# Patient Record
Sex: FEMALE | Race: WHITE | NOT HISPANIC OR LATINO | Employment: OTHER | URBAN - METROPOLITAN AREA
[De-identification: names, ages, dates, MRNs, and addresses within clinical notes are randomized per-mention and may not be internally consistent; named-entity substitution may affect disease eponyms.]

---

## 2017-01-05 ENCOUNTER — ALLSCRIPTS OFFICE VISIT (OUTPATIENT)
Dept: OTHER | Facility: OTHER | Age: 82
End: 2017-01-05

## 2017-02-23 ENCOUNTER — HOSPITAL ENCOUNTER (INPATIENT)
Facility: HOSPITAL | Age: 82
LOS: 4 days | Discharge: HOME | DRG: 865 | End: 2017-02-27
Attending: EMERGENCY MEDICINE | Admitting: INTERNAL MEDICINE
Payer: MEDICARE

## 2017-02-23 ENCOUNTER — APPOINTMENT (EMERGENCY)
Dept: CT IMAGING | Facility: HOSPITAL | Age: 82
DRG: 865 | End: 2017-02-23
Payer: MEDICARE

## 2017-02-23 ENCOUNTER — APPOINTMENT (EMERGENCY)
Dept: RADIOLOGY | Facility: HOSPITAL | Age: 82
DRG: 865 | End: 2017-02-23
Payer: MEDICARE

## 2017-02-23 DIAGNOSIS — R53.83 LETHARGY: ICD-10-CM

## 2017-02-23 DIAGNOSIS — G20 PARKINSON'S DISEASE (HCC): ICD-10-CM

## 2017-02-23 DIAGNOSIS — N39.0 URINARY TRACT INFECTION: Primary | ICD-10-CM

## 2017-02-23 DIAGNOSIS — R29.6 FALLS FREQUENTLY: ICD-10-CM

## 2017-02-23 DIAGNOSIS — J40 BRONCHITIS: ICD-10-CM

## 2017-02-23 DIAGNOSIS — E87.1 HYPONATREMIA: ICD-10-CM

## 2017-02-23 DIAGNOSIS — R53.1 ASTHENIA: ICD-10-CM

## 2017-02-23 DIAGNOSIS — R29.6 FREQUENT FALLS: ICD-10-CM

## 2017-02-23 DIAGNOSIS — E05.90 HYPERTHYROIDISM: ICD-10-CM

## 2017-02-23 PROBLEM — I10 ESSENTIAL HYPERTENSION: Status: ACTIVE | Noted: 2017-02-23

## 2017-02-23 LAB
ALBUMIN SERPL BCP-MCNC: 3.2 G/DL (ref 3.5–5)
ALP SERPL-CCNC: 149 U/L (ref 46–116)
ALT SERPL W P-5'-P-CCNC: 13 U/L (ref 12–78)
ANION GAP SERPL CALCULATED.3IONS-SCNC: 3 MMOL/L (ref 4–13)
AST SERPL W P-5'-P-CCNC: 51 U/L (ref 5–45)
BASOPHILS # BLD AUTO: 0.01 THOUSANDS/ΜL (ref 0–0.1)
BASOPHILS NFR BLD AUTO: 0 % (ref 0–1)
BILIRUB SERPL-MCNC: 0.5 MG/DL (ref 0.2–1)
BUN SERPL-MCNC: 15 MG/DL (ref 5–25)
CALCIUM SERPL-MCNC: 8.5 MG/DL (ref 8.3–10.1)
CHLORIDE SERPL-SCNC: 100 MMOL/L (ref 100–108)
CLARITY, POC: NORMAL
CO2 SERPL-SCNC: 28 MMOL/L (ref 21–32)
COLOR, POC: YELLOW
CREAT SERPL-MCNC: 0.87 MG/DL (ref 0.6–1.3)
EOSINOPHIL # BLD AUTO: 0 THOUSAND/ΜL (ref 0–0.61)
EOSINOPHIL NFR BLD AUTO: 0 % (ref 0–6)
ERYTHROCYTE [DISTWIDTH] IN BLOOD BY AUTOMATED COUNT: 14.5 % (ref 11.6–15.1)
EXT BILIRUBIN, UA: NEGATIVE
EXT BLOOD URINE: NORMAL
EXT GLUCOSE, UA: NEGATIVE
EXT KETONES: NORMAL
EXT NITRITE, UA: POSITIVE
EXT PH, UA: 6
EXT PROTEIN, UA: NORMAL
EXT SPECIFIC GRAVITY, UA: 1.03
EXT UROBILINOGEN: NEGATIVE
GFR SERPL CREATININE-BSD FRML MDRD: >60 ML/MIN/1.73SQ M
GLUCOSE SERPL-MCNC: 93 MG/DL (ref 65–140)
HCT VFR BLD AUTO: 40.2 % (ref 34.8–46.1)
HGB BLD-MCNC: 13.2 G/DL (ref 11.5–15.4)
LACTATE SERPL-SCNC: 1.1 MMOL/L (ref 0.5–2)
LYMPHOCYTES # BLD AUTO: 1.24 THOUSANDS/ΜL (ref 0.6–4.47)
LYMPHOCYTES NFR BLD AUTO: 18 % (ref 14–44)
MAGNESIUM SERPL-MCNC: 2.2 MG/DL (ref 1.6–2.6)
MCH RBC QN AUTO: 30 PG (ref 26.8–34.3)
MCHC RBC AUTO-ENTMCNC: 32.8 G/DL (ref 31.4–37.4)
MCV RBC AUTO: 91 FL (ref 82–98)
MONOCYTES # BLD AUTO: 0.74 THOUSAND/ΜL (ref 0.17–1.22)
MONOCYTES NFR BLD AUTO: 11 % (ref 4–12)
NEUTROPHILS # BLD AUTO: 4.74 THOUSANDS/ΜL (ref 1.85–7.62)
NEUTS SEG NFR BLD AUTO: 71 % (ref 43–75)
PLATELET # BLD AUTO: 192 THOUSANDS/UL (ref 149–390)
PMV BLD AUTO: 11.2 FL (ref 8.9–12.7)
POTASSIUM SERPL-SCNC: 3.7 MMOL/L (ref 3.5–5.3)
PROT SERPL-MCNC: 6.5 G/DL (ref 6.4–8.2)
RBC # BLD AUTO: 4.4 MILLION/UL (ref 3.81–5.12)
SODIUM SERPL-SCNC: 131 MMOL/L (ref 136–145)
TROPONIN I SERPL-MCNC: 0.03 NG/ML
TSH SERPL DL<=0.05 MIU/L-ACNC: 0.24 UIU/ML (ref 0.36–3.74)
WBC # BLD AUTO: 6.73 THOUSAND/UL (ref 4.31–10.16)
WBC # BLD EST: NEGATIVE 10*3/UL

## 2017-02-23 PROCEDURE — 87186 SC STD MICRODIL/AGAR DIL: CPT | Performed by: PHYSICIAN ASSISTANT

## 2017-02-23 PROCEDURE — 99285 EMERGENCY DEPT VISIT HI MDM: CPT

## 2017-02-23 PROCEDURE — 81002 URINALYSIS NONAUTO W/O SCOPE: CPT | Performed by: PHYSICIAN ASSISTANT

## 2017-02-23 PROCEDURE — 87040 BLOOD CULTURE FOR BACTERIA: CPT | Performed by: PHYSICIAN ASSISTANT

## 2017-02-23 PROCEDURE — 87086 URINE CULTURE/COLONY COUNT: CPT | Performed by: PHYSICIAN ASSISTANT

## 2017-02-23 PROCEDURE — 87798 DETECT AGENT NOS DNA AMP: CPT | Performed by: PHYSICIAN ASSISTANT

## 2017-02-23 PROCEDURE — 83605 ASSAY OF LACTIC ACID: CPT | Performed by: PHYSICIAN ASSISTANT

## 2017-02-23 PROCEDURE — 93005 ELECTROCARDIOGRAM TRACING: CPT

## 2017-02-23 PROCEDURE — 93005 ELECTROCARDIOGRAM TRACING: CPT | Performed by: PHYSICIAN ASSISTANT

## 2017-02-23 PROCEDURE — 73502 X-RAY EXAM HIP UNI 2-3 VIEWS: CPT

## 2017-02-23 PROCEDURE — 87077 CULTURE AEROBIC IDENTIFY: CPT | Performed by: PHYSICIAN ASSISTANT

## 2017-02-23 PROCEDURE — 80053 COMPREHEN METABOLIC PANEL: CPT | Performed by: PHYSICIAN ASSISTANT

## 2017-02-23 PROCEDURE — 36415 COLL VENOUS BLD VENIPUNCTURE: CPT | Performed by: PHYSICIAN ASSISTANT

## 2017-02-23 PROCEDURE — 84484 ASSAY OF TROPONIN QUANT: CPT | Performed by: PHYSICIAN ASSISTANT

## 2017-02-23 PROCEDURE — 71020 HB CHEST X-RAY 2VW FRONTAL&LATL: CPT

## 2017-02-23 PROCEDURE — 73564 X-RAY EXAM KNEE 4 OR MORE: CPT

## 2017-02-23 PROCEDURE — 73130 X-RAY EXAM OF HAND: CPT

## 2017-02-23 PROCEDURE — 85025 COMPLETE CBC W/AUTO DIFF WBC: CPT | Performed by: PHYSICIAN ASSISTANT

## 2017-02-23 PROCEDURE — 96360 HYDRATION IV INFUSION INIT: CPT

## 2017-02-23 PROCEDURE — 70450 CT HEAD/BRAIN W/O DYE: CPT

## 2017-02-23 PROCEDURE — 83735 ASSAY OF MAGNESIUM: CPT | Performed by: PHYSICIAN ASSISTANT

## 2017-02-23 PROCEDURE — 84443 ASSAY THYROID STIM HORMONE: CPT | Performed by: PHYSICIAN ASSISTANT

## 2017-02-23 RX ORDER — CARBIDOPA AND LEVODOPA 25; 100 MG/1; MG/1
1 TABLET, EXTENDED RELEASE ORAL 3 TIMES DAILY
COMMUNITY
End: 2018-05-22 | Stop reason: SDUPTHER

## 2017-02-23 RX ORDER — LEVOTHYROXINE SODIUM 0.05 MG/1
50 TABLET ORAL
Status: DISCONTINUED | OUTPATIENT
Start: 2017-02-24 | End: 2017-02-27 | Stop reason: HOSPADM

## 2017-02-23 RX ORDER — LEVOTHYROXINE AND LIOTHYRONINE 19; 4.5 UG/1; UG/1
15 TABLET ORAL DAILY
Status: ON HOLD | COMMUNITY
End: 2019-01-01 | Stop reason: SDUPTHER

## 2017-02-23 RX ORDER — SELEGILINE HYDROCHLORIDE 5 MG/1
5 TABLET ORAL
Status: DISCONTINUED | OUTPATIENT
Start: 2017-02-24 | End: 2017-02-27 | Stop reason: HOSPADM

## 2017-02-23 RX ORDER — ASPIRIN 81 MG/1
81 TABLET, CHEWABLE ORAL DAILY
Status: DISCONTINUED | OUTPATIENT
Start: 2017-02-24 | End: 2017-02-27 | Stop reason: HOSPADM

## 2017-02-23 RX ORDER — SODIUM CHLORIDE 9 MG/ML
100 INJECTION, SOLUTION INTRAVENOUS CONTINUOUS
Status: DISCONTINUED | OUTPATIENT
Start: 2017-02-23 | End: 2017-02-26

## 2017-02-23 RX ORDER — DOCUSATE SODIUM 100 MG/1
100 CAPSULE, LIQUID FILLED ORAL 2 TIMES DAILY
Status: DISCONTINUED | OUTPATIENT
Start: 2017-02-23 | End: 2017-02-27 | Stop reason: HOSPADM

## 2017-02-23 RX ORDER — ACETAMINOPHEN 325 MG/1
650 TABLET ORAL EVERY 6 HOURS PRN
Status: DISCONTINUED | OUTPATIENT
Start: 2017-02-23 | End: 2017-02-27 | Stop reason: HOSPADM

## 2017-02-23 RX ORDER — SELEGILINE HYDROCHLORIDE 5 MG/1
5 TABLET ORAL
COMMUNITY
Start: 2016-07-28 | End: 2018-05-22 | Stop reason: ALTCHOICE

## 2017-02-23 RX ORDER — ONDANSETRON 2 MG/ML
4 INJECTION INTRAMUSCULAR; INTRAVENOUS EVERY 6 HOURS PRN
Status: DISCONTINUED | OUTPATIENT
Start: 2017-02-23 | End: 2017-02-27 | Stop reason: HOSPADM

## 2017-02-23 RX ORDER — HEPARIN SODIUM 5000 [USP'U]/ML
5000 INJECTION, SOLUTION INTRAVENOUS; SUBCUTANEOUS EVERY 8 HOURS SCHEDULED
Status: DISCONTINUED | OUTPATIENT
Start: 2017-02-23 | End: 2017-02-27 | Stop reason: HOSPADM

## 2017-02-23 RX ORDER — CARBIDOPA AND LEVODOPA 25; 100 MG/1; MG/1
1 TABLET, EXTENDED RELEASE ORAL 3 TIMES DAILY
Status: DISCONTINUED | OUTPATIENT
Start: 2017-02-23 | End: 2017-02-27 | Stop reason: HOSPADM

## 2017-02-23 RX ORDER — SENNOSIDES 8.6 MG
1 TABLET ORAL DAILY
Status: DISCONTINUED | OUTPATIENT
Start: 2017-02-24 | End: 2017-02-27 | Stop reason: HOSPADM

## 2017-02-23 RX ORDER — CARBIDOPA AND LEVODOPA 25; 100 MG/1; MG/1
1 TABLET, EXTENDED RELEASE ORAL 2 TIMES DAILY
Status: DISCONTINUED | OUTPATIENT
Start: 2017-02-23 | End: 2017-02-23

## 2017-02-23 RX ADMIN — DOCUSATE SODIUM 100 MG: 100 CAPSULE, LIQUID FILLED ORAL at 17:25

## 2017-02-23 RX ADMIN — CARBIDOPA AND LEVODOPA 1 TABLET: 25; 100 TABLET ORAL at 13:22

## 2017-02-23 RX ADMIN — HEPARIN SODIUM 5000 UNITS: 5000 INJECTION, SOLUTION INTRAVENOUS; SUBCUTANEOUS at 17:25

## 2017-02-23 RX ADMIN — CARBIDOPA AND LEVODOPA 1 TABLET: 25; 100 TABLET ORAL at 17:24

## 2017-02-23 RX ADMIN — CARBIDOPA AND LEVODOPA 1 TABLET: 25; 100 TABLET, EXTENDED RELEASE ORAL at 13:21

## 2017-02-23 RX ADMIN — SODIUM CHLORIDE 1000 ML: 0.9 INJECTION, SOLUTION INTRAVENOUS at 13:15

## 2017-02-23 RX ADMIN — CARBIDOPA AND LEVODOPA 1 TABLET: 25; 100 TABLET, EXTENDED RELEASE ORAL at 17:25

## 2017-02-23 RX ADMIN — CEFTRIAXONE SODIUM 1000 MG: 10 INJECTION, POWDER, FOR SOLUTION INTRAVENOUS at 15:26

## 2017-02-23 RX ADMIN — SODIUM CHLORIDE 100 ML/HR: 0.9 INJECTION, SOLUTION INTRAVENOUS at 17:24

## 2017-02-24 PROBLEM — J10.1 INFLUENZA B: Status: ACTIVE | Noted: 2017-02-24

## 2017-02-24 LAB
ANION GAP SERPL CALCULATED.3IONS-SCNC: 11 MMOL/L (ref 4–13)
ATRIAL RATE: 62 BPM
ATRIAL RATE: 63 BPM
ATRIAL RATE: 64 BPM
BUN SERPL-MCNC: 11 MG/DL (ref 5–25)
CALCIUM SERPL-MCNC: 8.1 MG/DL (ref 8.3–10.1)
CHLORIDE SERPL-SCNC: 103 MMOL/L (ref 100–108)
CO2 SERPL-SCNC: 22 MMOL/L (ref 21–32)
CREAT SERPL-MCNC: 0.68 MG/DL (ref 0.6–1.3)
ERYTHROCYTE [DISTWIDTH] IN BLOOD BY AUTOMATED COUNT: 14.3 % (ref 11.6–15.1)
FLUAV AG SPEC QL: ABNORMAL
FLUBV AG SPEC QL: DETECTED
GFR SERPL CREATININE-BSD FRML MDRD: >60 ML/MIN/1.73SQ M
GLUCOSE SERPL-MCNC: 99 MG/DL (ref 65–140)
HCT VFR BLD AUTO: 39.7 % (ref 34.8–46.1)
HGB BLD-MCNC: 13.1 G/DL (ref 11.5–15.4)
MCH RBC QN AUTO: 29.8 PG (ref 26.8–34.3)
MCHC RBC AUTO-ENTMCNC: 33 G/DL (ref 31.4–37.4)
MCV RBC AUTO: 90 FL (ref 82–98)
P AXIS: 48 DEGREES
P AXIS: 77 DEGREES
P AXIS: 87 DEGREES
PLATELET # BLD AUTO: 176 THOUSANDS/UL (ref 149–390)
PMV BLD AUTO: 11.3 FL (ref 8.9–12.7)
POTASSIUM SERPL-SCNC: 3 MMOL/L (ref 3.5–5.3)
PR INTERVAL: 180 MS
PR INTERVAL: 186 MS
PR INTERVAL: 190 MS
QRS AXIS: -25 DEGREES
QRS AXIS: -29 DEGREES
QRS AXIS: -35 DEGREES
QRSD INTERVAL: 70 MS
QRSD INTERVAL: 78 MS
QRSD INTERVAL: 78 MS
QT INTERVAL: 410 MS
QT INTERVAL: 422 MS
QT INTERVAL: 432 MS
QTC INTERVAL: 416 MS
QTC INTERVAL: 431 MS
QTC INTERVAL: 445 MS
RBC # BLD AUTO: 4.4 MILLION/UL (ref 3.81–5.12)
RSV B RNA SPEC QL NAA+PROBE: ABNORMAL
SODIUM SERPL-SCNC: 136 MMOL/L (ref 136–145)
T WAVE AXIS: 40 DEGREES
T WAVE AXIS: 48 DEGREES
T WAVE AXIS: 54 DEGREES
VENTRICULAR RATE: 62 BPM
VENTRICULAR RATE: 63 BPM
VENTRICULAR RATE: 64 BPM
WBC # BLD AUTO: 6.54 THOUSAND/UL (ref 4.31–10.16)

## 2017-02-24 PROCEDURE — 97163 PT EVAL HIGH COMPLEX 45 MIN: CPT

## 2017-02-24 PROCEDURE — 85027 COMPLETE CBC AUTOMATED: CPT | Performed by: PHYSICIAN ASSISTANT

## 2017-02-24 PROCEDURE — G8978 MOBILITY CURRENT STATUS: HCPCS

## 2017-02-24 PROCEDURE — G8979 MOBILITY GOAL STATUS: HCPCS

## 2017-02-24 PROCEDURE — 80048 BASIC METABOLIC PNL TOTAL CA: CPT | Performed by: PHYSICIAN ASSISTANT

## 2017-02-24 RX ORDER — POTASSIUM CHLORIDE 14.9 MG/ML
20 INJECTION INTRAVENOUS
Status: COMPLETED | OUTPATIENT
Start: 2017-02-24 | End: 2017-02-24

## 2017-02-24 RX ADMIN — LEVOTHYROXINE SODIUM 50 MCG: 50 TABLET ORAL at 05:12

## 2017-02-24 RX ADMIN — ACETAMINOPHEN 650 MG: 325 TABLET ORAL at 05:30

## 2017-02-24 RX ADMIN — HEPARIN SODIUM 5000 UNITS: 5000 INJECTION, SOLUTION INTRAVENOUS; SUBCUTANEOUS at 21:43

## 2017-02-24 RX ADMIN — POTASSIUM CHLORIDE 20 MEQ: 200 INJECTION, SOLUTION INTRAVENOUS at 12:24

## 2017-02-24 RX ADMIN — SODIUM CHLORIDE 100 ML/HR: 0.9 INJECTION, SOLUTION INTRAVENOUS at 03:22

## 2017-02-24 RX ADMIN — SODIUM CHLORIDE 100 ML/HR: 0.9 INJECTION, SOLUTION INTRAVENOUS at 15:30

## 2017-02-24 RX ADMIN — CARBIDOPA AND LEVODOPA 1 TABLET: 25; 100 TABLET ORAL at 12:24

## 2017-02-24 RX ADMIN — ASPIRIN 81 MG 81 MG: 81 TABLET ORAL at 08:16

## 2017-02-24 RX ADMIN — CARBIDOPA AND LEVODOPA 1 TABLET: 25; 100 TABLET, EXTENDED RELEASE ORAL at 16:49

## 2017-02-24 RX ADMIN — SELEGILINE HYDROCHLORIDE 5 MG: 5 TABLET ORAL at 08:15

## 2017-02-24 RX ADMIN — CARBIDOPA AND LEVODOPA 1 TABLET: 25; 100 TABLET ORAL at 08:16

## 2017-02-24 RX ADMIN — CARBIDOPA AND LEVODOPA 1 TABLET: 25; 100 TABLET ORAL at 16:49

## 2017-02-24 RX ADMIN — HEPARIN SODIUM 5000 UNITS: 5000 INJECTION, SOLUTION INTRAVENOUS; SUBCUTANEOUS at 14:20

## 2017-02-24 RX ADMIN — CARBIDOPA AND LEVODOPA 1 TABLET: 25; 100 TABLET, EXTENDED RELEASE ORAL at 12:24

## 2017-02-24 RX ADMIN — POTASSIUM CHLORIDE 20 MEQ: 200 INJECTION, SOLUTION INTRAVENOUS at 10:48

## 2017-02-24 RX ADMIN — CARBIDOPA AND LEVODOPA 1 TABLET: 25; 100 TABLET, EXTENDED RELEASE ORAL at 08:16

## 2017-02-25 PROBLEM — G93.41 ACUTE METABOLIC ENCEPHALOPATHY: Status: ACTIVE | Noted: 2017-02-25

## 2017-02-25 LAB
ALBUMIN SERPL BCP-MCNC: 2.3 G/DL (ref 3.5–5)
ALP SERPL-CCNC: 184 U/L (ref 46–116)
ALT SERPL W P-5'-P-CCNC: 8 U/L (ref 12–78)
ANION GAP SERPL CALCULATED.3IONS-SCNC: 9 MMOL/L (ref 4–13)
AST SERPL W P-5'-P-CCNC: 45 U/L (ref 5–45)
BACTERIA UR CULT: NORMAL
BASOPHILS # BLD AUTO: 0.01 THOUSANDS/ΜL (ref 0–0.1)
BASOPHILS NFR BLD AUTO: 0 % (ref 0–1)
BILIRUB SERPL-MCNC: 0.5 MG/DL (ref 0.2–1)
BUN SERPL-MCNC: 12 MG/DL (ref 5–25)
CALCIUM SERPL-MCNC: 8.1 MG/DL (ref 8.3–10.1)
CHLORIDE SERPL-SCNC: 108 MMOL/L (ref 100–108)
CO2 SERPL-SCNC: 22 MMOL/L (ref 21–32)
CREAT SERPL-MCNC: 0.78 MG/DL (ref 0.6–1.3)
EOSINOPHIL # BLD AUTO: 0 THOUSAND/ΜL (ref 0–0.61)
EOSINOPHIL NFR BLD AUTO: 0 % (ref 0–6)
ERYTHROCYTE [DISTWIDTH] IN BLOOD BY AUTOMATED COUNT: 14.9 % (ref 11.6–15.1)
GFR SERPL CREATININE-BSD FRML MDRD: >60 ML/MIN/1.73SQ M
GLUCOSE SERPL-MCNC: 114 MG/DL (ref 65–140)
HCT VFR BLD AUTO: 34.5 % (ref 34.8–46.1)
HGB BLD-MCNC: 11.4 G/DL (ref 11.5–15.4)
LYMPHOCYTES # BLD AUTO: 1.19 THOUSANDS/ΜL (ref 0.6–4.47)
LYMPHOCYTES NFR BLD AUTO: 14 % (ref 14–44)
MCH RBC QN AUTO: 30.2 PG (ref 26.8–34.3)
MCHC RBC AUTO-ENTMCNC: 33 G/DL (ref 31.4–37.4)
MCV RBC AUTO: 91 FL (ref 82–98)
MONOCYTES # BLD AUTO: 0.47 THOUSAND/ΜL (ref 0.17–1.22)
MONOCYTES NFR BLD AUTO: 6 % (ref 4–12)
NEUTROPHILS # BLD AUTO: 6.69 THOUSANDS/ΜL (ref 1.85–7.62)
NEUTS SEG NFR BLD AUTO: 80 % (ref 43–75)
PLATELET # BLD AUTO: 176 THOUSANDS/UL (ref 149–390)
PMV BLD AUTO: 11.1 FL (ref 8.9–12.7)
POTASSIUM SERPL-SCNC: 3.5 MMOL/L (ref 3.5–5.3)
PROT SERPL-MCNC: 5.6 G/DL (ref 6.4–8.2)
RBC # BLD AUTO: 3.78 MILLION/UL (ref 3.81–5.12)
SODIUM SERPL-SCNC: 139 MMOL/L (ref 136–145)
WBC # BLD AUTO: 8.36 THOUSAND/UL (ref 4.31–10.16)

## 2017-02-25 PROCEDURE — 85025 COMPLETE CBC W/AUTO DIFF WBC: CPT | Performed by: PHYSICIAN ASSISTANT

## 2017-02-25 PROCEDURE — 80053 COMPREHEN METABOLIC PANEL: CPT | Performed by: PHYSICIAN ASSISTANT

## 2017-02-25 RX ADMIN — SODIUM CHLORIDE 100 ML/HR: 0.9 INJECTION, SOLUTION INTRAVENOUS at 20:15

## 2017-02-25 RX ADMIN — DOCUSATE SODIUM 100 MG: 100 CAPSULE, LIQUID FILLED ORAL at 08:00

## 2017-02-25 RX ADMIN — CARBIDOPA AND LEVODOPA 1 TABLET: 25; 100 TABLET, EXTENDED RELEASE ORAL at 16:51

## 2017-02-25 RX ADMIN — CARBIDOPA AND LEVODOPA 1 TABLET: 25; 100 TABLET, EXTENDED RELEASE ORAL at 08:00

## 2017-02-25 RX ADMIN — SENNOSIDES 8.6 MG: 8.6 TABLET, FILM COATED ORAL at 08:00

## 2017-02-25 RX ADMIN — SODIUM CHLORIDE 100 ML/HR: 0.9 INJECTION, SOLUTION INTRAVENOUS at 10:36

## 2017-02-25 RX ADMIN — HEPARIN SODIUM 5000 UNITS: 5000 INJECTION, SOLUTION INTRAVENOUS; SUBCUTANEOUS at 05:23

## 2017-02-25 RX ADMIN — CARBIDOPA AND LEVODOPA 1 TABLET: 25; 100 TABLET, EXTENDED RELEASE ORAL at 12:40

## 2017-02-25 RX ADMIN — CARBIDOPA AND LEVODOPA 1 TABLET: 25; 100 TABLET ORAL at 12:40

## 2017-02-25 RX ADMIN — HEPARIN SODIUM 5000 UNITS: 5000 INJECTION, SOLUTION INTRAVENOUS; SUBCUTANEOUS at 22:49

## 2017-02-25 RX ADMIN — SELEGILINE HYDROCHLORIDE 5 MG: 5 TABLET ORAL at 08:11

## 2017-02-25 RX ADMIN — DOCUSATE SODIUM 100 MG: 100 CAPSULE, LIQUID FILLED ORAL at 17:46

## 2017-02-25 RX ADMIN — CARBIDOPA AND LEVODOPA 1 TABLET: 25; 100 TABLET ORAL at 08:05

## 2017-02-25 RX ADMIN — LEVOTHYROXINE SODIUM 50 MCG: 50 TABLET ORAL at 05:23

## 2017-02-25 RX ADMIN — CARBIDOPA AND LEVODOPA 1 TABLET: 25; 100 TABLET ORAL at 16:50

## 2017-02-25 RX ADMIN — HEPARIN SODIUM 5000 UNITS: 5000 INJECTION, SOLUTION INTRAVENOUS; SUBCUTANEOUS at 14:13

## 2017-02-25 RX ADMIN — ASPIRIN 81 MG 81 MG: 81 TABLET ORAL at 08:00

## 2017-02-26 PROBLEM — E87.1 HYPONATREMIA: Status: RESOLVED | Noted: 2017-02-23 | Resolved: 2017-02-26

## 2017-02-26 LAB
ALBUMIN SERPL BCP-MCNC: 2.2 G/DL (ref 3.5–5)
ALP SERPL-CCNC: 241 U/L (ref 46–116)
ALT SERPL W P-5'-P-CCNC: 27 U/L (ref 12–78)
ANION GAP SERPL CALCULATED.3IONS-SCNC: 9 MMOL/L (ref 4–13)
AST SERPL W P-5'-P-CCNC: 39 U/L (ref 5–45)
BASOPHILS # BLD AUTO: 0.01 THOUSANDS/ΜL (ref 0–0.1)
BASOPHILS NFR BLD AUTO: 0 % (ref 0–1)
BILIRUB SERPL-MCNC: 0.4 MG/DL (ref 0.2–1)
BUN SERPL-MCNC: 11 MG/DL (ref 5–25)
CALCIUM SERPL-MCNC: 8 MG/DL (ref 8.3–10.1)
CHLORIDE SERPL-SCNC: 109 MMOL/L (ref 100–108)
CO2 SERPL-SCNC: 23 MMOL/L (ref 21–32)
CREAT SERPL-MCNC: 0.76 MG/DL (ref 0.6–1.3)
EOSINOPHIL # BLD AUTO: 0 THOUSAND/ΜL (ref 0–0.61)
EOSINOPHIL NFR BLD AUTO: 0 % (ref 0–6)
ERYTHROCYTE [DISTWIDTH] IN BLOOD BY AUTOMATED COUNT: 14.9 % (ref 11.6–15.1)
GFR SERPL CREATININE-BSD FRML MDRD: >60 ML/MIN/1.73SQ M
GLUCOSE SERPL-MCNC: 89 MG/DL (ref 65–140)
HCT VFR BLD AUTO: 34.8 % (ref 34.8–46.1)
HGB BLD-MCNC: 11.3 G/DL (ref 11.5–15.4)
LYMPHOCYTES # BLD AUTO: 1.35 THOUSANDS/ΜL (ref 0.6–4.47)
LYMPHOCYTES NFR BLD AUTO: 22 % (ref 14–44)
MCH RBC QN AUTO: 29.6 PG (ref 26.8–34.3)
MCHC RBC AUTO-ENTMCNC: 32.5 G/DL (ref 31.4–37.4)
MCV RBC AUTO: 91 FL (ref 82–98)
MONOCYTES # BLD AUTO: 0.35 THOUSAND/ΜL (ref 0.17–1.22)
MONOCYTES NFR BLD AUTO: 6 % (ref 4–12)
NEUTROPHILS # BLD AUTO: 4.38 THOUSANDS/ΜL (ref 1.85–7.62)
NEUTS SEG NFR BLD AUTO: 72 % (ref 43–75)
PLATELET # BLD AUTO: 188 THOUSANDS/UL (ref 149–390)
PMV BLD AUTO: 11.8 FL (ref 8.9–12.7)
POTASSIUM SERPL-SCNC: 3.5 MMOL/L (ref 3.5–5.3)
PROT SERPL-MCNC: 5.7 G/DL (ref 6.4–8.2)
RBC # BLD AUTO: 3.82 MILLION/UL (ref 3.81–5.12)
SODIUM SERPL-SCNC: 141 MMOL/L (ref 136–145)
TSH SERPL DL<=0.05 MIU/L-ACNC: 1.89 UIU/ML (ref 0.36–3.74)
WBC # BLD AUTO: 6.09 THOUSAND/UL (ref 4.31–10.16)

## 2017-02-26 PROCEDURE — 84443 ASSAY THYROID STIM HORMONE: CPT | Performed by: PHYSICIAN ASSISTANT

## 2017-02-26 PROCEDURE — 97116 GAIT TRAINING THERAPY: CPT

## 2017-02-26 PROCEDURE — 85025 COMPLETE CBC W/AUTO DIFF WBC: CPT | Performed by: PHYSICIAN ASSISTANT

## 2017-02-26 PROCEDURE — 97530 THERAPEUTIC ACTIVITIES: CPT

## 2017-02-26 PROCEDURE — 82652 VIT D 1 25-DIHYDROXY: CPT | Performed by: PHYSICIAN ASSISTANT

## 2017-02-26 PROCEDURE — 97166 OT EVAL MOD COMPLEX 45 MIN: CPT

## 2017-02-26 PROCEDURE — G8987 SELF CARE CURRENT STATUS: HCPCS

## 2017-02-26 PROCEDURE — 80053 COMPREHEN METABOLIC PANEL: CPT | Performed by: PHYSICIAN ASSISTANT

## 2017-02-26 PROCEDURE — G8988 SELF CARE GOAL STATUS: HCPCS

## 2017-02-26 RX ORDER — CHOLECALCIFEROL (VITAMIN D3) 10 MCG
1 TABLET ORAL
Status: DISCONTINUED | OUTPATIENT
Start: 2017-02-26 | End: 2017-02-27 | Stop reason: HOSPADM

## 2017-02-26 RX ORDER — CHLORAL HYDRATE 500 MG
1000 CAPSULE ORAL DAILY
Status: DISCONTINUED | OUTPATIENT
Start: 2017-02-26 | End: 2017-02-27 | Stop reason: HOSPADM

## 2017-02-26 RX ORDER — LISINOPRIL 5 MG/1
5 TABLET ORAL DAILY
Status: DISCONTINUED | OUTPATIENT
Start: 2017-02-26 | End: 2017-02-27 | Stop reason: HOSPADM

## 2017-02-26 RX ORDER — CEPHALEXIN 500 MG/1
500 CAPSULE ORAL EVERY 8 HOURS SCHEDULED
Status: DISCONTINUED | OUTPATIENT
Start: 2017-02-27 | End: 2017-02-27 | Stop reason: HOSPADM

## 2017-02-26 RX ORDER — LABETALOL HYDROCHLORIDE 5 MG/ML
10 INJECTION, SOLUTION INTRAVENOUS EVERY 4 HOURS PRN
Status: DISCONTINUED | OUTPATIENT
Start: 2017-02-26 | End: 2017-02-27 | Stop reason: HOSPADM

## 2017-02-26 RX ADMIN — ASPIRIN 81 MG 81 MG: 81 TABLET ORAL at 08:00

## 2017-02-26 RX ADMIN — HEPARIN SODIUM 5000 UNITS: 5000 INJECTION, SOLUTION INTRAVENOUS; SUBCUTANEOUS at 06:34

## 2017-02-26 RX ADMIN — CARBIDOPA AND LEVODOPA 1 TABLET: 25; 100 TABLET, EXTENDED RELEASE ORAL at 07:30

## 2017-02-26 RX ADMIN — LEVOTHYROXINE SODIUM 50 MCG: 50 TABLET ORAL at 06:34

## 2017-02-26 RX ADMIN — CARBIDOPA AND LEVODOPA 1 TABLET: 25; 100 TABLET, EXTENDED RELEASE ORAL at 11:57

## 2017-02-26 RX ADMIN — Medication 1000 MG: at 13:06

## 2017-02-26 RX ADMIN — SELEGILINE HYDROCHLORIDE 5 MG: 5 TABLET ORAL at 06:34

## 2017-02-26 RX ADMIN — CARBIDOPA AND LEVODOPA 1 TABLET: 25; 100 TABLET ORAL at 15:51

## 2017-02-26 RX ADMIN — HEPARIN SODIUM 5000 UNITS: 5000 INJECTION, SOLUTION INTRAVENOUS; SUBCUTANEOUS at 13:26

## 2017-02-26 RX ADMIN — CARBIDOPA AND LEVODOPA 1 TABLET: 25; 100 TABLET, EXTENDED RELEASE ORAL at 15:51

## 2017-02-26 RX ADMIN — Medication 1 CAPSULE: at 15:51

## 2017-02-26 RX ADMIN — SODIUM CHLORIDE 100 ML/HR: 0.9 INJECTION, SOLUTION INTRAVENOUS at 07:16

## 2017-02-26 RX ADMIN — Medication 1 TABLET: at 13:06

## 2017-02-26 RX ADMIN — LISINOPRIL 5 MG: 5 TABLET ORAL at 09:10

## 2017-02-26 RX ADMIN — CARBIDOPA AND LEVODOPA 1 TABLET: 25; 100 TABLET ORAL at 11:57

## 2017-02-26 RX ADMIN — DOCUSATE SODIUM 100 MG: 100 CAPSULE, LIQUID FILLED ORAL at 17:25

## 2017-02-26 RX ADMIN — DOCUSATE SODIUM 100 MG: 100 CAPSULE, LIQUID FILLED ORAL at 08:00

## 2017-02-26 RX ADMIN — CARBIDOPA AND LEVODOPA 1 TABLET: 25; 100 TABLET ORAL at 07:30

## 2017-02-26 RX ADMIN — LABETALOL HYDROCHLORIDE 10 MG: 5 INJECTION, SOLUTION INTRAVENOUS at 17:26

## 2017-02-26 RX ADMIN — SENNOSIDES 8.6 MG: 8.6 TABLET, FILM COATED ORAL at 08:00

## 2017-02-26 RX ADMIN — CEFTRIAXONE SODIUM 1000 MG: 10 INJECTION, POWDER, FOR SOLUTION INTRAVENOUS at 02:35

## 2017-02-26 RX ADMIN — Medication 30 MG: at 13:26

## 2017-02-26 RX ADMIN — HEPARIN SODIUM 5000 UNITS: 5000 INJECTION, SOLUTION INTRAVENOUS; SUBCUTANEOUS at 22:03

## 2017-02-27 ENCOUNTER — APPOINTMENT (INPATIENT)
Dept: RADIOLOGY | Facility: HOSPITAL | Age: 82
DRG: 865 | End: 2017-02-27
Payer: MEDICARE

## 2017-02-27 VITALS
RESPIRATION RATE: 18 BRPM | DIASTOLIC BLOOD PRESSURE: 78 MMHG | OXYGEN SATURATION: 89 % | HEIGHT: 61 IN | BODY MASS INDEX: 24.64 KG/M2 | TEMPERATURE: 97.2 F | SYSTOLIC BLOOD PRESSURE: 132 MMHG | WEIGHT: 130.51 LBS | HEART RATE: 56 BPM

## 2017-02-27 LAB
ANION GAP SERPL CALCULATED.3IONS-SCNC: 7 MMOL/L (ref 4–13)
BASOPHILS # BLD AUTO: 0.03 THOUSANDS/ΜL (ref 0–0.1)
BASOPHILS NFR BLD AUTO: 1 % (ref 0–1)
BUN SERPL-MCNC: 7 MG/DL (ref 5–25)
CALCIUM SERPL-MCNC: 8.9 MG/DL (ref 8.3–10.1)
CHLORIDE SERPL-SCNC: 108 MMOL/L (ref 100–108)
CO2 SERPL-SCNC: 29 MMOL/L (ref 21–32)
CREAT SERPL-MCNC: 0.69 MG/DL (ref 0.6–1.3)
EOSINOPHIL # BLD AUTO: 0.01 THOUSAND/ΜL (ref 0–0.61)
EOSINOPHIL NFR BLD AUTO: 0 % (ref 0–6)
ERYTHROCYTE [DISTWIDTH] IN BLOOD BY AUTOMATED COUNT: 14.4 % (ref 11.6–15.1)
GFR SERPL CREATININE-BSD FRML MDRD: >60 ML/MIN/1.73SQ M
GLUCOSE SERPL-MCNC: 90 MG/DL (ref 65–140)
HCT VFR BLD AUTO: 38.3 % (ref 34.8–46.1)
HGB BLD-MCNC: 13 G/DL (ref 11.5–15.4)
LYMPHOCYTES # BLD AUTO: 1.49 THOUSANDS/ΜL (ref 0.6–4.47)
LYMPHOCYTES NFR BLD AUTO: 29 % (ref 14–44)
MCH RBC QN AUTO: 30.2 PG (ref 26.8–34.3)
MCHC RBC AUTO-ENTMCNC: 33.9 G/DL (ref 31.4–37.4)
MCV RBC AUTO: 89 FL (ref 82–98)
MONOCYTES # BLD AUTO: 0.47 THOUSAND/ΜL (ref 0.17–1.22)
MONOCYTES NFR BLD AUTO: 9 % (ref 4–12)
NEUTROPHILS # BLD AUTO: 3.13 THOUSANDS/ΜL (ref 1.85–7.62)
NEUTS SEG NFR BLD AUTO: 61 % (ref 43–75)
PLATELET # BLD AUTO: 244 THOUSANDS/UL (ref 149–390)
PMV BLD AUTO: 10.9 FL (ref 8.9–12.7)
POTASSIUM SERPL-SCNC: 3.6 MMOL/L (ref 3.5–5.3)
RBC # BLD AUTO: 4.3 MILLION/UL (ref 3.81–5.12)
SODIUM SERPL-SCNC: 144 MMOL/L (ref 136–145)
WBC # BLD AUTO: 5.13 THOUSAND/UL (ref 4.31–10.16)

## 2017-02-27 PROCEDURE — 71020 HB CHEST X-RAY 2VW FRONTAL&LATL: CPT

## 2017-02-27 PROCEDURE — 80048 BASIC METABOLIC PNL TOTAL CA: CPT | Performed by: PHYSICIAN ASSISTANT

## 2017-02-27 PROCEDURE — 85025 COMPLETE CBC W/AUTO DIFF WBC: CPT | Performed by: PHYSICIAN ASSISTANT

## 2017-02-27 PROCEDURE — 97116 GAIT TRAINING THERAPY: CPT

## 2017-02-27 RX ORDER — CEPHALEXIN 500 MG/1
500 CAPSULE ORAL EVERY 8 HOURS SCHEDULED
Qty: 30 CAPSULE | Refills: 0 | Status: SHIPPED | OUTPATIENT
Start: 2017-02-27 | End: 2017-03-09

## 2017-02-27 RX ADMIN — CARBIDOPA AND LEVODOPA 1 TABLET: 25; 100 TABLET, EXTENDED RELEASE ORAL at 11:56

## 2017-02-27 RX ADMIN — CARBIDOPA AND LEVODOPA 1 TABLET: 25; 100 TABLET, EXTENDED RELEASE ORAL at 08:13

## 2017-02-27 RX ADMIN — CEFTRIAXONE SODIUM 1000 MG: 10 INJECTION, POWDER, FOR SOLUTION INTRAVENOUS at 01:43

## 2017-02-27 RX ADMIN — DOCUSATE SODIUM 100 MG: 100 CAPSULE, LIQUID FILLED ORAL at 08:19

## 2017-02-27 RX ADMIN — HEPARIN SODIUM 5000 UNITS: 5000 INJECTION, SOLUTION INTRAVENOUS; SUBCUTANEOUS at 06:51

## 2017-02-27 RX ADMIN — CEPHALEXIN 500 MG: 500 CAPSULE ORAL at 06:51

## 2017-02-27 RX ADMIN — Medication 1000 MG: at 08:12

## 2017-02-27 RX ADMIN — Medication 30 MG: at 08:12

## 2017-02-27 RX ADMIN — ASPIRIN 81 MG 81 MG: 81 TABLET ORAL at 08:15

## 2017-02-27 RX ADMIN — Medication 1 TABLET: at 08:12

## 2017-02-27 RX ADMIN — LISINOPRIL 5 MG: 5 TABLET ORAL at 08:16

## 2017-02-27 RX ADMIN — CARBIDOPA AND LEVODOPA 1 TABLET: 25; 100 TABLET ORAL at 11:56

## 2017-02-27 RX ADMIN — CARBIDOPA AND LEVODOPA 1 TABLET: 25; 100 TABLET ORAL at 08:13

## 2017-02-27 RX ADMIN — LEVOTHYROXINE SODIUM 50 MCG: 50 TABLET ORAL at 06:51

## 2017-02-27 RX ADMIN — SENNOSIDES 8.6 MG: 8.6 TABLET, FILM COATED ORAL at 08:13

## 2017-02-27 RX ADMIN — SELEGILINE HYDROCHLORIDE 5 MG: 5 TABLET ORAL at 06:50

## 2017-02-28 LAB
BACTERIA BLD CULT: NORMAL
BACTERIA BLD CULT: NORMAL

## 2017-03-01 LAB — 1,25(OH)2D3 SERPL-MCNC: 123 PG/ML (ref 19.9–79.3)

## 2017-05-04 ENCOUNTER — ALLSCRIPTS OFFICE VISIT (OUTPATIENT)
Dept: OTHER | Facility: OTHER | Age: 82
End: 2017-05-04

## 2017-09-05 ENCOUNTER — ALLSCRIPTS OFFICE VISIT (OUTPATIENT)
Dept: OTHER | Facility: OTHER | Age: 82
End: 2017-09-05

## 2018-01-01 ENCOUNTER — APPOINTMENT (OUTPATIENT)
Dept: RADIOLOGY | Facility: HOSPITAL | Age: 83
DRG: 690 | End: 2018-01-01
Payer: MEDICARE

## 2018-01-01 ENCOUNTER — APPOINTMENT (INPATIENT)
Dept: RADIOLOGY | Facility: HOSPITAL | Age: 83
DRG: 690 | End: 2018-01-01
Payer: MEDICARE

## 2018-01-01 ENCOUNTER — APPOINTMENT (EMERGENCY)
Dept: RADIOLOGY | Facility: HOSPITAL | Age: 83
DRG: 690 | End: 2018-01-01
Payer: MEDICARE

## 2018-01-01 ENCOUNTER — ANESTHESIA EVENT (INPATIENT)
Dept: PERIOP | Facility: HOSPITAL | Age: 83
DRG: 690 | End: 2018-01-01
Payer: MEDICARE

## 2018-01-01 ENCOUNTER — TELEPHONE (OUTPATIENT)
Dept: NEUROLOGY | Facility: CLINIC | Age: 83
End: 2018-01-01

## 2018-01-01 ENCOUNTER — HOSPITAL ENCOUNTER (INPATIENT)
Facility: HOSPITAL | Age: 83
LOS: 3 days | Discharge: HOME | DRG: 690 | End: 2018-11-23
Attending: EMERGENCY MEDICINE | Admitting: INTERNAL MEDICINE
Payer: MEDICARE

## 2018-01-01 ENCOUNTER — HOSPITAL ENCOUNTER (INPATIENT)
Dept: RADIOLOGY | Facility: HOSPITAL | Age: 83
Discharge: HOME | DRG: 690 | End: 2018-11-23
Payer: MEDICARE

## 2018-01-01 ENCOUNTER — ANESTHESIA (INPATIENT)
Dept: PERIOP | Facility: HOSPITAL | Age: 83
DRG: 690 | End: 2018-01-01
Payer: MEDICARE

## 2018-01-01 ENCOUNTER — OFFICE VISIT (OUTPATIENT)
Dept: NEUROLOGY | Facility: CLINIC | Age: 83
End: 2018-01-01
Payer: MEDICARE

## 2018-01-01 VITALS
BODY MASS INDEX: 21.53 KG/M2 | DIASTOLIC BLOOD PRESSURE: 74 MMHG | SYSTOLIC BLOOD PRESSURE: 122 MMHG | WEIGHT: 117 LBS | HEIGHT: 62 IN

## 2018-01-01 VITALS
HEIGHT: 62 IN | BODY MASS INDEX: 20.24 KG/M2 | OXYGEN SATURATION: 95 % | SYSTOLIC BLOOD PRESSURE: 136 MMHG | DIASTOLIC BLOOD PRESSURE: 70 MMHG | WEIGHT: 110.01 LBS | HEART RATE: 62 BPM | TEMPERATURE: 97.5 F | RESPIRATION RATE: 20 BRPM

## 2018-01-01 DIAGNOSIS — G20 PARKINSON'S DISEASE (HCC): Primary | Chronic | ICD-10-CM

## 2018-01-01 DIAGNOSIS — R74.8 ELEVATED ALKALINE PHOSPHATASE LEVEL: ICD-10-CM

## 2018-01-01 DIAGNOSIS — J31.0 OTHER RHINITIS: ICD-10-CM

## 2018-01-01 DIAGNOSIS — N39.0 UTI (URINARY TRACT INFECTION): ICD-10-CM

## 2018-01-01 DIAGNOSIS — N13.30 HYDRONEPHROSIS: ICD-10-CM

## 2018-01-01 DIAGNOSIS — E87.1 HYPONATREMIA: ICD-10-CM

## 2018-01-01 DIAGNOSIS — R53.1 WEAKNESS: Primary | ICD-10-CM

## 2018-01-01 LAB
ALBUMIN SERPL BCP-MCNC: 2.1 G/DL (ref 3.5–5)
ALBUMIN SERPL BCP-MCNC: 2.3 G/DL (ref 3.5–5)
ALBUMIN SERPL BCP-MCNC: 2.4 G/DL (ref 3.5–5)
ALBUMIN SERPL BCP-MCNC: 2.9 G/DL (ref 3.5–5)
ALP SERPL-CCNC: 249 U/L (ref 46–116)
ALP SERPL-CCNC: 277 U/L (ref 46–116)
ALP SERPL-CCNC: 285 U/L (ref 46–116)
ALP SERPL-CCNC: 291 U/L (ref 46–116)
ALT SERPL W P-5'-P-CCNC: 15 U/L (ref 12–78)
ALT SERPL W P-5'-P-CCNC: 16 U/L (ref 12–78)
ANION GAP SERPL CALCULATED.3IONS-SCNC: 10 MMOL/L (ref 4–13)
ANION GAP SERPL CALCULATED.3IONS-SCNC: 11 MMOL/L (ref 4–13)
ANION GAP SERPL CALCULATED.3IONS-SCNC: 11 MMOL/L (ref 4–13)
ANION GAP SERPL CALCULATED.3IONS-SCNC: 4 MMOL/L (ref 4–13)
AST SERPL W P-5'-P-CCNC: 25 U/L (ref 5–45)
AST SERPL W P-5'-P-CCNC: 34 U/L (ref 5–45)
AST SERPL W P-5'-P-CCNC: 40 U/L (ref 5–45)
AST SERPL W P-5'-P-CCNC: 64 U/L (ref 5–45)
BACTERIA UR CULT: ABNORMAL
BACTERIA UR QL AUTO: ABNORMAL /HPF
BASOPHILS # BLD AUTO: 0.03 THOUSANDS/ΜL (ref 0–0.1)
BASOPHILS # BLD MANUAL: 0 THOUSAND/UL (ref 0–0.1)
BASOPHILS NFR BLD AUTO: 0 % (ref 0–1)
BASOPHILS NFR MAR MANUAL: 0 % (ref 0–1)
BILIRUB DIRECT SERPL-MCNC: 0.3 MG/DL (ref 0–0.2)
BILIRUB SERPL-MCNC: 0.2 MG/DL (ref 0.2–1)
BILIRUB SERPL-MCNC: 0.3 MG/DL (ref 0.2–1)
BILIRUB SERPL-MCNC: 0.4 MG/DL (ref 0.2–1)
BILIRUB SERPL-MCNC: 0.8 MG/DL (ref 0.2–1)
BILIRUB UR QL STRIP: NEGATIVE
BUN SERPL-MCNC: 14 MG/DL (ref 5–25)
BUN SERPL-MCNC: 16 MG/DL (ref 5–25)
BUN SERPL-MCNC: 16 MG/DL (ref 5–25)
BUN SERPL-MCNC: 17 MG/DL (ref 5–25)
BUN SERPL-MCNC: 20 MG/DL (ref 5–25)
BUN SERPL-MCNC: 22 MG/DL (ref 5–25)
CALCIUM SERPL-MCNC: 8.7 MG/DL (ref 8.3–10.1)
CALCIUM SERPL-MCNC: 9 MG/DL (ref 8.3–10.1)
CALCIUM SERPL-MCNC: 9.2 MG/DL (ref 8.3–10.1)
CALCIUM SERPL-MCNC: 9.4 MG/DL (ref 8.3–10.1)
CALCIUM SERPL-MCNC: 9.6 MG/DL (ref 8.3–10.1)
CALCIUM SERPL-MCNC: 9.9 MG/DL (ref 8.3–10.1)
CHLORIDE SERPL-SCNC: 101 MMOL/L (ref 100–108)
CHLORIDE SERPL-SCNC: 102 MMOL/L (ref 100–108)
CHLORIDE SERPL-SCNC: 103 MMOL/L (ref 100–108)
CHLORIDE SERPL-SCNC: 104 MMOL/L (ref 100–108)
CHLORIDE SERPL-SCNC: 104 MMOL/L (ref 100–108)
CHLORIDE SERPL-SCNC: 95 MMOL/L (ref 100–108)
CK SERPL-CCNC: 37 U/L (ref 26–192)
CLARITY UR: ABNORMAL
CO2 SERPL-SCNC: 21 MMOL/L (ref 21–32)
CO2 SERPL-SCNC: 26 MMOL/L (ref 21–32)
CO2 SERPL-SCNC: 28 MMOL/L (ref 21–32)
CO2 SERPL-SCNC: 28 MMOL/L (ref 21–32)
COLOR UR: YELLOW
CREAT SERPL-MCNC: 0.88 MG/DL (ref 0.6–1.3)
CREAT SERPL-MCNC: 0.94 MG/DL (ref 0.6–1.3)
CREAT SERPL-MCNC: 0.98 MG/DL (ref 0.6–1.3)
CREAT SERPL-MCNC: 1.04 MG/DL (ref 0.6–1.3)
CREAT SERPL-MCNC: 1.12 MG/DL (ref 0.6–1.3)
CREAT SERPL-MCNC: 1.24 MG/DL (ref 0.6–1.3)
EOSINOPHIL # BLD AUTO: 0.05 THOUSAND/ΜL (ref 0–0.61)
EOSINOPHIL # BLD MANUAL: 0 THOUSAND/UL (ref 0–0.4)
EOSINOPHIL NFR BLD AUTO: 1 % (ref 0–6)
EOSINOPHIL NFR BLD MANUAL: 0 % (ref 0–6)
ERYTHROCYTE [DISTWIDTH] IN BLOOD BY AUTOMATED COUNT: 13.4 % (ref 11.6–15.1)
ERYTHROCYTE [DISTWIDTH] IN BLOOD BY AUTOMATED COUNT: 13.6 % (ref 11.6–15.1)
ERYTHROCYTE [DISTWIDTH] IN BLOOD BY AUTOMATED COUNT: 13.7 % (ref 11.6–15.1)
ERYTHROCYTE [DISTWIDTH] IN BLOOD BY AUTOMATED COUNT: 13.7 % (ref 11.6–15.1)
ERYTHROCYTE [DISTWIDTH] IN BLOOD BY AUTOMATED COUNT: 14.1 % (ref 11.6–15.1)
GFR SERPL CREATININE-BSD FRML MDRD: 40 ML/MIN/1.73SQ M
GFR SERPL CREATININE-BSD FRML MDRD: 45 ML/MIN/1.73SQ M
GFR SERPL CREATININE-BSD FRML MDRD: 49 ML/MIN/1.73SQ M
GFR SERPL CREATININE-BSD FRML MDRD: 53 ML/MIN/1.73SQ M
GFR SERPL CREATININE-BSD FRML MDRD: 55 ML/MIN/1.73SQ M
GFR SERPL CREATININE-BSD FRML MDRD: 60 ML/MIN/1.73SQ M
GGT SERPL-CCNC: 92 U/L (ref 5–85)
GLUCOSE P FAST SERPL-MCNC: 136 MG/DL (ref 65–99)
GLUCOSE SERPL-MCNC: 100 MG/DL (ref 65–140)
GLUCOSE SERPL-MCNC: 106 MG/DL (ref 65–140)
GLUCOSE SERPL-MCNC: 136 MG/DL (ref 65–140)
GLUCOSE SERPL-MCNC: 85 MG/DL (ref 65–140)
GLUCOSE SERPL-MCNC: 94 MG/DL (ref 65–140)
GLUCOSE SERPL-MCNC: 94 MG/DL (ref 65–140)
GLUCOSE UR STRIP-MCNC: NEGATIVE MG/DL
HCT VFR BLD AUTO: 36.7 % (ref 34.8–46.1)
HCT VFR BLD AUTO: 37 % (ref 34.8–46.1)
HCT VFR BLD AUTO: 38.6 % (ref 34.8–46.1)
HCT VFR BLD AUTO: 39.6 % (ref 34.8–46.1)
HCT VFR BLD AUTO: 42.3 % (ref 34.8–46.1)
HGB BLD-MCNC: 12.1 G/DL (ref 11.5–15.4)
HGB BLD-MCNC: 12.2 G/DL (ref 11.5–15.4)
HGB BLD-MCNC: 12.6 G/DL (ref 11.5–15.4)
HGB BLD-MCNC: 13 G/DL (ref 11.5–15.4)
HGB BLD-MCNC: 13.9 G/DL (ref 11.5–15.4)
HGB UR QL STRIP.AUTO: ABNORMAL
IMM GRANULOCYTES # BLD AUTO: 0.02 THOUSAND/UL (ref 0–0.2)
IMM GRANULOCYTES NFR BLD AUTO: 0 % (ref 0–2)
KETONES UR STRIP-MCNC: ABNORMAL MG/DL
LEUKOCYTE ESTERASE UR QL STRIP: ABNORMAL
LYMPHOCYTES # BLD AUTO: 1.62 THOUSANDS/ΜL (ref 0.6–4.47)
LYMPHOCYTES # BLD AUTO: 2.41 THOUSAND/UL (ref 0.6–4.47)
LYMPHOCYTES # BLD AUTO: 43 % (ref 14–44)
LYMPHOCYTES NFR BLD AUTO: 21 % (ref 14–44)
MAGNESIUM SERPL-MCNC: 2.7 MG/DL (ref 1.6–2.6)
MCH RBC QN AUTO: 29.5 PG (ref 26.8–34.3)
MCH RBC QN AUTO: 30.2 PG (ref 26.8–34.3)
MCH RBC QN AUTO: 30.2 PG (ref 26.8–34.3)
MCH RBC QN AUTO: 30.3 PG (ref 26.8–34.3)
MCH RBC QN AUTO: 30.5 PG (ref 26.8–34.3)
MCHC RBC AUTO-ENTMCNC: 32.6 G/DL (ref 31.4–37.4)
MCHC RBC AUTO-ENTMCNC: 32.8 G/DL (ref 31.4–37.4)
MCHC RBC AUTO-ENTMCNC: 32.9 G/DL (ref 31.4–37.4)
MCHC RBC AUTO-ENTMCNC: 33 G/DL (ref 31.4–37.4)
MCHC RBC AUTO-ENTMCNC: 33 G/DL (ref 31.4–37.4)
MCV RBC AUTO: 90 FL (ref 82–98)
MCV RBC AUTO: 92 FL (ref 82–98)
MCV RBC AUTO: 94 FL (ref 82–98)
MONOCYTES # BLD AUTO: 0.28 THOUSAND/UL (ref 0–1.22)
MONOCYTES # BLD AUTO: 0.76 THOUSAND/ΜL (ref 0.17–1.22)
MONOCYTES NFR BLD AUTO: 10 % (ref 4–12)
MONOCYTES NFR BLD: 5 % (ref 4–12)
MRSA NOSE QL CULT: NORMAL
NEUTROPHILS # BLD AUTO: 5.11 THOUSANDS/ΜL (ref 1.85–7.62)
NEUTROPHILS # BLD MANUAL: 2.92 THOUSAND/UL (ref 1.85–7.62)
NEUTS BAND NFR BLD MANUAL: 2 % (ref 0–8)
NEUTS SEG NFR BLD AUTO: 50 % (ref 43–75)
NEUTS SEG NFR BLD AUTO: 68 % (ref 43–75)
NITRITE UR QL STRIP: POSITIVE
NON-SQ EPI CELLS URNS QL MICRO: ABNORMAL /HPF
NRBC BLD AUTO-RTO: 0 /100 WBCS
NRBC BLD AUTO-RTO: 0 /100 WBCS
OSMOLALITY UR/SERPL-RTO: 290 MMOL/KG (ref 282–298)
OSMOLALITY UR: 437 MMOL/KG
PH UR STRIP.AUTO: 7.5 [PH] (ref 5–9)
PLATELET # BLD AUTO: 245 THOUSANDS/UL (ref 149–390)
PLATELET # BLD AUTO: 277 THOUSANDS/UL (ref 149–390)
PLATELET # BLD AUTO: 299 THOUSANDS/UL (ref 149–390)
PLATELET # BLD AUTO: 322 THOUSANDS/UL (ref 149–390)
PLATELET # BLD AUTO: 348 THOUSANDS/UL (ref 149–390)
PLATELET BLD QL SMEAR: ADEQUATE
PMV BLD AUTO: 10.2 FL (ref 8.9–12.7)
PMV BLD AUTO: 10.6 FL (ref 8.9–12.7)
PMV BLD AUTO: 11.7 FL (ref 8.9–12.7)
POTASSIUM SERPL-SCNC: 3.6 MMOL/L (ref 3.5–5.3)
POTASSIUM SERPL-SCNC: 3.7 MMOL/L (ref 3.5–5.3)
POTASSIUM SERPL-SCNC: 3.8 MMOL/L (ref 3.5–5.3)
POTASSIUM SERPL-SCNC: 3.8 MMOL/L (ref 3.5–5.3)
POTASSIUM SERPL-SCNC: 3.9 MMOL/L (ref 3.5–5.3)
POTASSIUM SERPL-SCNC: 4 MMOL/L (ref 3.5–5.3)
PROCALCITONIN SERPL-MCNC: 0.24 NG/ML
PROCALCITONIN SERPL-MCNC: 0.34 NG/ML
PROT SERPL-MCNC: 6.2 G/DL (ref 6.4–8.2)
PROT SERPL-MCNC: 6.5 G/DL (ref 6.4–8.2)
PROT SERPL-MCNC: 6.5 G/DL (ref 6.4–8.2)
PROT SERPL-MCNC: 7.4 G/DL (ref 6.4–8.2)
PROT UR STRIP-MCNC: ABNORMAL MG/DL
RBC # BLD AUTO: 4.03 MILLION/UL (ref 3.81–5.12)
RBC # BLD AUTO: 4.1 MILLION/UL (ref 3.81–5.12)
RBC # BLD AUTO: 4.13 MILLION/UL (ref 3.81–5.12)
RBC # BLD AUTO: 4.3 MILLION/UL (ref 3.81–5.12)
RBC # BLD AUTO: 4.6 MILLION/UL (ref 3.81–5.12)
RBC #/AREA URNS AUTO: ABNORMAL /HPF
SODIUM 24H UR-SCNC: 23 MOL/L
SODIUM SERPL-SCNC: 127 MMOL/L (ref 136–145)
SODIUM SERPL-SCNC: 133 MMOL/L (ref 136–145)
SODIUM SERPL-SCNC: 134 MMOL/L (ref 136–145)
SODIUM SERPL-SCNC: 138 MMOL/L (ref 136–145)
SODIUM SERPL-SCNC: 139 MMOL/L (ref 136–145)
SODIUM SERPL-SCNC: 140 MMOL/L (ref 136–145)
SODIUM SERPL-SCNC: 143 MMOL/L (ref 136–145)
SP GR UR STRIP.AUTO: 1.02 (ref 1–1.03)
TOTAL CELLS COUNTED SPEC: 100
TSH SERPL DL<=0.05 MIU/L-ACNC: 0.57 UIU/ML (ref 0.36–3.74)
UROBILINOGEN UR QL STRIP.AUTO: 0.2 E.U./DL
WBC # BLD AUTO: 4.7 THOUSAND/UL (ref 4.31–10.16)
WBC # BLD AUTO: 5.39 THOUSAND/UL (ref 4.31–10.16)
WBC # BLD AUTO: 5.61 THOUSAND/UL (ref 4.31–10.16)
WBC # BLD AUTO: 6.66 THOUSAND/UL (ref 4.31–10.16)
WBC # BLD AUTO: 7.59 THOUSAND/UL (ref 4.31–10.16)
WBC #/AREA URNS AUTO: ABNORMAL /HPF
WBC TOXIC VACUOLES BLD QL SMEAR: PRESENT

## 2018-01-01 PROCEDURE — 97167 OT EVAL HIGH COMPLEX 60 MIN: CPT

## 2018-01-01 PROCEDURE — 81001 URINALYSIS AUTO W/SCOPE: CPT | Performed by: EMERGENCY MEDICINE

## 2018-01-01 PROCEDURE — 99214 OFFICE O/P EST MOD 30 MIN: CPT | Performed by: PHYSICIAN ASSISTANT

## 2018-01-01 PROCEDURE — 87077 CULTURE AEROBIC IDENTIFY: CPT | Performed by: EMERGENCY MEDICINE

## 2018-01-01 PROCEDURE — 87086 URINE CULTURE/COLONY COUNT: CPT | Performed by: EMERGENCY MEDICINE

## 2018-01-01 PROCEDURE — G8978 MOBILITY CURRENT STATUS: HCPCS

## 2018-01-01 PROCEDURE — 99232 SBSQ HOSP IP/OBS MODERATE 35: CPT | Performed by: INTERNAL MEDICINE

## 2018-01-01 PROCEDURE — 84295 ASSAY OF SERUM SODIUM: CPT | Performed by: NURSE PRACTITIONER

## 2018-01-01 PROCEDURE — 84145 PROCALCITONIN (PCT): CPT | Performed by: NURSE PRACTITIONER

## 2018-01-01 PROCEDURE — 84300 ASSAY OF URINE SODIUM: CPT | Performed by: NURSE PRACTITIONER

## 2018-01-01 PROCEDURE — 99285 EMERGENCY DEPT VISIT HI MDM: CPT

## 2018-01-01 PROCEDURE — 97163 PT EVAL HIGH COMPLEX 45 MIN: CPT

## 2018-01-01 PROCEDURE — G8988 SELF CARE GOAL STATUS: HCPCS

## 2018-01-01 PROCEDURE — 87081 CULTURE SCREEN ONLY: CPT | Performed by: NURSE PRACTITIONER

## 2018-01-01 PROCEDURE — 72100 X-RAY EXAM L-S SPINE 2/3 VWS: CPT

## 2018-01-01 PROCEDURE — 99220 PR INITIAL OBSERVATION CARE/DAY 70 MINUTES: CPT | Performed by: NURSE PRACTITIONER

## 2018-01-01 PROCEDURE — 74450 X-RAY URETHRA/BLADDER: CPT

## 2018-01-01 PROCEDURE — 80076 HEPATIC FUNCTION PANEL: CPT | Performed by: EMERGENCY MEDICINE

## 2018-01-01 PROCEDURE — 96361 HYDRATE IV INFUSION ADD-ON: CPT

## 2018-01-01 PROCEDURE — 96360 HYDRATION IV INFUSION INIT: CPT

## 2018-01-01 PROCEDURE — 0T778DZ DILATION OF LEFT URETER WITH INTRALUMINAL DEVICE, VIA NATURAL OR ARTIFICIAL OPENING ENDOSCOPIC: ICD-10-PCS | Performed by: SPECIALIST

## 2018-01-01 PROCEDURE — 80048 BASIC METABOLIC PNL TOTAL CA: CPT | Performed by: EMERGENCY MEDICINE

## 2018-01-01 PROCEDURE — 85027 COMPLETE CBC AUTOMATED: CPT | Performed by: NURSE PRACTITIONER

## 2018-01-01 PROCEDURE — 99239 HOSP IP/OBS DSCHRG MGMT >30: CPT | Performed by: INTERNAL MEDICINE

## 2018-01-01 PROCEDURE — 85025 COMPLETE CBC W/AUTO DIFF WBC: CPT | Performed by: EMERGENCY MEDICINE

## 2018-01-01 PROCEDURE — 97110 THERAPEUTIC EXERCISES: CPT

## 2018-01-01 PROCEDURE — 97535 SELF CARE MNGMENT TRAINING: CPT

## 2018-01-01 PROCEDURE — C1726 CATH, BAL DIL, NON-VASCULAR: HCPCS | Performed by: SPECIALIST

## 2018-01-01 PROCEDURE — 80048 BASIC METABOLIC PNL TOTAL CA: CPT | Performed by: NURSE PRACTITIONER

## 2018-01-01 PROCEDURE — 84295 ASSAY OF SERUM SODIUM: CPT | Performed by: INTERNAL MEDICINE

## 2018-01-01 PROCEDURE — 36415 COLL VENOUS BLD VENIPUNCTURE: CPT | Performed by: EMERGENCY MEDICINE

## 2018-01-01 PROCEDURE — C2617 STENT, NON-COR, TEM W/O DEL: HCPCS | Performed by: SPECIALIST

## 2018-01-01 PROCEDURE — 76770 US EXAM ABDO BACK WALL COMP: CPT

## 2018-01-01 PROCEDURE — G8979 MOBILITY GOAL STATUS: HCPCS

## 2018-01-01 PROCEDURE — 70450 CT HEAD/BRAIN W/O DYE: CPT

## 2018-01-01 PROCEDURE — 74178 CT ABD&PLV WO CNTR FLWD CNTR: CPT

## 2018-01-01 PROCEDURE — 99225 PR SBSQ OBSERVATION CARE/DAY 25 MINUTES: CPT | Performed by: INTERNAL MEDICINE

## 2018-01-01 PROCEDURE — 83930 ASSAY OF BLOOD OSMOLALITY: CPT | Performed by: NURSE PRACTITIONER

## 2018-01-01 PROCEDURE — 83935 ASSAY OF URINE OSMOLALITY: CPT | Performed by: NURSE PRACTITIONER

## 2018-01-01 PROCEDURE — 80053 COMPREHEN METABOLIC PANEL: CPT | Performed by: NURSE PRACTITIONER

## 2018-01-01 PROCEDURE — 85027 COMPLETE CBC AUTOMATED: CPT | Performed by: INTERNAL MEDICINE

## 2018-01-01 PROCEDURE — C1769 GUIDE WIRE: HCPCS | Performed by: SPECIALIST

## 2018-01-01 PROCEDURE — 84443 ASSAY THYROID STIM HORMONE: CPT | Performed by: NURSE PRACTITIONER

## 2018-01-01 PROCEDURE — 80048 BASIC METABOLIC PNL TOTAL CA: CPT | Performed by: INTERNAL MEDICINE

## 2018-01-01 PROCEDURE — 82550 ASSAY OF CK (CPK): CPT | Performed by: EMERGENCY MEDICINE

## 2018-01-01 PROCEDURE — G8987 SELF CARE CURRENT STATUS: HCPCS

## 2018-01-01 PROCEDURE — 83735 ASSAY OF MAGNESIUM: CPT | Performed by: EMERGENCY MEDICINE

## 2018-01-01 PROCEDURE — 82977 ASSAY OF GGT: CPT | Performed by: INTERNAL MEDICINE

## 2018-01-01 PROCEDURE — 85007 BL SMEAR W/DIFF WBC COUNT: CPT | Performed by: INTERNAL MEDICINE

## 2018-01-01 PROCEDURE — 99222 1ST HOSP IP/OBS MODERATE 55: CPT | Performed by: INTERNAL MEDICINE

## 2018-01-01 PROCEDURE — 87186 SC STD MICRODIL/AGAR DIL: CPT | Performed by: EMERGENCY MEDICINE

## 2018-01-01 PROCEDURE — 74176 CT ABD & PELVIS W/O CONTRAST: CPT

## 2018-01-01 DEVICE — URETERAL STENT 7 FR X 24 CM: Type: IMPLANTABLE DEVICE | Site: URETER | Status: FUNCTIONAL

## 2018-01-01 RX ORDER — CARBIDOPA AND LEVODOPA 25; 100 MG/1; MG/1
1 TABLET, EXTENDED RELEASE ORAL 4 TIMES DAILY
Status: DISCONTINUED | OUTPATIENT
Start: 2018-01-01 | End: 2018-01-01 | Stop reason: HOSPADM

## 2018-01-01 RX ORDER — CEFAZOLIN SODIUM 1 G/50ML
1000 SOLUTION INTRAVENOUS EVERY 8 HOURS
Status: DISCONTINUED | OUTPATIENT
Start: 2018-01-01 | End: 2018-01-01 | Stop reason: HOSPADM

## 2018-01-01 RX ORDER — CARBIDOPA AND LEVODOPA 25; 100 MG/1; MG/1
1 TABLET, EXTENDED RELEASE ORAL 2 TIMES DAILY
Status: DISCONTINUED | OUTPATIENT
Start: 2018-01-01 | End: 2018-01-01

## 2018-01-01 RX ORDER — ONDANSETRON 2 MG/ML
INJECTION INTRAMUSCULAR; INTRAVENOUS AS NEEDED
Status: DISCONTINUED | OUTPATIENT
Start: 2018-01-01 | End: 2018-01-01 | Stop reason: SURG

## 2018-01-01 RX ORDER — CEFADROXIL 1000 MG/1
1 TABLET ORAL 2 TIMES DAILY
Qty: 10 TABLET | Refills: 0 | Status: SHIPPED | OUTPATIENT
Start: 2018-01-01 | End: 2018-01-01

## 2018-01-01 RX ORDER — SODIUM CHLORIDE 9 MG/ML
75 INJECTION, SOLUTION INTRAVENOUS CONTINUOUS
Status: DISCONTINUED | OUTPATIENT
Start: 2018-01-01 | End: 2018-01-01 | Stop reason: HOSPADM

## 2018-01-01 RX ORDER — PHENAZOPYRIDINE HYDROCHLORIDE 100 MG/1
TABLET, FILM COATED ORAL
Qty: 6 TABLET | Refills: 0 | Status: SHIPPED | OUTPATIENT
Start: 2018-01-01 | End: 2018-01-01 | Stop reason: ALTCHOICE

## 2018-01-01 RX ORDER — PHENAZOPYRIDINE HYDROCHLORIDE 100 MG/1
TABLET, FILM COATED ORAL
Qty: 6 TABLET | Refills: 0 | Status: SHIPPED | OUTPATIENT
Start: 2018-01-01 | End: 2018-01-01

## 2018-01-01 RX ORDER — PROMETHAZINE HYDROCHLORIDE 25 MG/ML
12.5 INJECTION, SOLUTION INTRAMUSCULAR; INTRAVENOUS ONCE AS NEEDED
Status: DISCONTINUED | OUTPATIENT
Start: 2018-01-01 | End: 2018-01-01 | Stop reason: HOSPADM

## 2018-01-01 RX ORDER — AZELASTINE 1 MG/ML
1 SPRAY, METERED NASAL 2 TIMES DAILY
Status: DISCONTINUED | OUTPATIENT
Start: 2018-01-01 | End: 2018-01-01 | Stop reason: HOSPADM

## 2018-01-01 RX ORDER — CARBIDOPA/LEVODOPA 25MG-250MG
1 TABLET ORAL 3 TIMES DAILY
Status: DISCONTINUED | OUTPATIENT
Start: 2018-01-01 | End: 2018-01-01

## 2018-01-01 RX ORDER — AZELASTINE 1 MG/ML
1 SPRAY, METERED NASAL 2 TIMES DAILY
Qty: 30 ML | Refills: 0 | Status: SHIPPED | OUTPATIENT
Start: 2018-01-01

## 2018-01-01 RX ORDER — CEFAZOLIN SODIUM 1 G/3ML
INJECTION, POWDER, FOR SOLUTION INTRAMUSCULAR; INTRAVENOUS AS NEEDED
Status: DISCONTINUED | OUTPATIENT
Start: 2018-01-01 | End: 2018-01-01 | Stop reason: SURG

## 2018-01-01 RX ORDER — ASCORBIC ACID 500 MG
500 TABLET ORAL DAILY
Status: DISCONTINUED | OUTPATIENT
Start: 2018-01-01 | End: 2018-01-01 | Stop reason: HOSPADM

## 2018-01-01 RX ORDER — PHENAZOPYRIDINE HYDROCHLORIDE 100 MG/1
TABLET, FILM COATED ORAL
Qty: 10 TABLET | Refills: 0 | Status: SHIPPED | OUTPATIENT
Start: 2018-01-01 | End: 2018-01-01

## 2018-01-01 RX ORDER — DEXTROSE MONOHYDRATE 50 MG/ML
125 INJECTION, SOLUTION INTRAVENOUS CONTINUOUS
Status: DISCONTINUED | OUTPATIENT
Start: 2018-01-01 | End: 2018-01-01

## 2018-01-01 RX ORDER — ACETAMINOPHEN 325 MG/1
650 TABLET ORAL EVERY 6 HOURS PRN
Status: DISCONTINUED | OUTPATIENT
Start: 2018-01-01 | End: 2018-01-01 | Stop reason: HOSPADM

## 2018-01-01 RX ORDER — LANOLIN ALCOHOL/MO/W.PET/CERES
6 CREAM (GRAM) TOPICAL
Status: DISCONTINUED | OUTPATIENT
Start: 2018-01-01 | End: 2018-01-01 | Stop reason: HOSPADM

## 2018-01-01 RX ORDER — LIDOCAINE HYDROCHLORIDE 10 MG/ML
INJECTION, SOLUTION INFILTRATION; PERINEURAL AS NEEDED
Status: DISCONTINUED | OUTPATIENT
Start: 2018-01-01 | End: 2018-01-01 | Stop reason: SURG

## 2018-01-01 RX ORDER — ECHINACEA PURPUREA EXTRACT 125 MG
1 TABLET ORAL AS NEEDED
Status: DISCONTINUED | OUTPATIENT
Start: 2018-01-01 | End: 2018-01-01 | Stop reason: HOSPADM

## 2018-01-01 RX ORDER — HYDRALAZINE HYDROCHLORIDE 20 MG/ML
5 INJECTION INTRAMUSCULAR; INTRAVENOUS ONCE
Status: DISCONTINUED | OUTPATIENT
Start: 2018-01-01 | End: 2018-01-01

## 2018-01-01 RX ORDER — MAGNESIUM HYDROXIDE 1200 MG/15ML
LIQUID ORAL AS NEEDED
Status: DISCONTINUED | OUTPATIENT
Start: 2018-01-01 | End: 2018-01-01 | Stop reason: HOSPADM

## 2018-01-01 RX ORDER — DEXTROSE MONOHYDRATE 50 MG/ML
50 INJECTION, SOLUTION INTRAVENOUS CONTINUOUS
Status: DISCONTINUED | OUTPATIENT
Start: 2018-01-01 | End: 2018-01-01

## 2018-01-01 RX ORDER — CEFTRIAXONE 1 G/50ML
1000 INJECTION, SOLUTION INTRAVENOUS EVERY 24 HOURS
Status: DISCONTINUED | OUTPATIENT
Start: 2018-01-01 | End: 2018-01-01

## 2018-01-01 RX ORDER — CARBIDOPA AND LEVODOPA 25; 100 MG/1; MG/1
TABLET, EXTENDED RELEASE ORAL
Qty: 450 TABLET | Refills: 3 | Status: SHIPPED | OUTPATIENT
Start: 2018-01-01 | End: 2019-01-01 | Stop reason: HOSPADM

## 2018-01-01 RX ORDER — PROPOFOL 10 MG/ML
INJECTION, EMULSION INTRAVENOUS AS NEEDED
Status: DISCONTINUED | OUTPATIENT
Start: 2018-01-01 | End: 2018-01-01 | Stop reason: SURG

## 2018-01-01 RX ORDER — FENTANYL CITRATE/PF 50 MCG/ML
50 SYRINGE (ML) INJECTION
Status: DISCONTINUED | OUTPATIENT
Start: 2018-01-01 | End: 2018-01-01 | Stop reason: HOSPADM

## 2018-01-01 RX ORDER — MINERAL OIL AND PETROLATUM 150; 830 MG/G; MG/G
OINTMENT OPHTHALMIC
Status: DISCONTINUED | OUTPATIENT
Start: 2018-01-01 | End: 2018-01-01 | Stop reason: HOSPADM

## 2018-01-01 RX ORDER — LANOLIN ALCOHOL/MO/W.PET/CERES
3 CREAM (GRAM) TOPICAL
Status: DISCONTINUED | OUTPATIENT
Start: 2018-01-01 | End: 2018-01-01

## 2018-01-01 RX ORDER — INSULIN GLARGINE 100 [IU]/ML
INJECTION, SOLUTION SUBCUTANEOUS
Status: DISPENSED
Start: 2018-01-01 | End: 2018-01-01

## 2018-01-01 RX ORDER — LEVOTHYROXINE SODIUM 0.05 MG/1
50 TABLET ORAL
Status: DISCONTINUED | OUTPATIENT
Start: 2018-01-01 | End: 2018-01-01 | Stop reason: HOSPADM

## 2018-01-01 RX ORDER — LEVOFLOXACIN 5 MG/ML
500 INJECTION, SOLUTION INTRAVENOUS ONCE
Status: DISCONTINUED | OUTPATIENT
Start: 2018-01-01 | End: 2018-01-01 | Stop reason: HOSPADM

## 2018-01-01 RX ORDER — ACETAMINOPHEN 325 MG/1
650 TABLET ORAL ONCE
Status: DISCONTINUED | OUTPATIENT
Start: 2018-01-01 | End: 2018-01-01

## 2018-01-01 RX ORDER — ONDANSETRON 2 MG/ML
4 INJECTION INTRAMUSCULAR; INTRAVENOUS ONCE AS NEEDED
Status: DISCONTINUED | OUTPATIENT
Start: 2018-01-01 | End: 2018-01-01 | Stop reason: HOSPADM

## 2018-01-01 RX ORDER — FENTANYL CITRATE 50 UG/ML
INJECTION, SOLUTION INTRAMUSCULAR; INTRAVENOUS AS NEEDED
Status: DISCONTINUED | OUTPATIENT
Start: 2018-01-01 | End: 2018-01-01 | Stop reason: SURG

## 2018-01-01 RX ORDER — SODIUM CHLORIDE 9 MG/ML
INJECTION, SOLUTION INTRAVENOUS CONTINUOUS PRN
Status: DISCONTINUED | OUTPATIENT
Start: 2018-01-01 | End: 2018-01-01 | Stop reason: SURG

## 2018-01-01 RX ORDER — DEXTROSE MONOHYDRATE 50 MG/ML
75 INJECTION, SOLUTION INTRAVENOUS CONTINUOUS
Status: DISPENSED | OUTPATIENT
Start: 2018-01-01 | End: 2018-01-01

## 2018-01-01 RX ADMIN — MELATONIN 6 MG: 3 TAB ORAL at 21:18

## 2018-01-01 RX ADMIN — CARBIDOPA AND LEVODOPA 1 TABLET: 25; 100 TABLET, EXTENDED RELEASE ORAL at 16:21

## 2018-01-01 RX ADMIN — CARBIDOPA AND LEVODOPA 1.5 TABLET: 25; 100 TABLET ORAL at 13:07

## 2018-01-01 RX ADMIN — CARBIDOPA AND LEVODOPA 1.5 TABLET: 25; 100 TABLET ORAL at 16:42

## 2018-01-01 RX ADMIN — CARBIDOPA AND LEVODOPA 1.5 TABLET: 25; 100 TABLET ORAL at 12:21

## 2018-01-01 RX ADMIN — CARBIDOPA AND LEVODOPA 1 TABLET: 25; 100 TABLET, EXTENDED RELEASE ORAL at 21:33

## 2018-01-01 RX ADMIN — CEFTRIAXONE 1000 MG: 1 INJECTION, SOLUTION INTRAVENOUS at 20:15

## 2018-01-01 RX ADMIN — AZELASTINE HYDROCHLORIDE 1 SPRAY: 137 SPRAY, METERED NASAL at 18:21

## 2018-01-01 RX ADMIN — CARBIDOPA AND LEVODOPA 1 TABLET: 25; 100 TABLET, EXTENDED RELEASE ORAL at 19:54

## 2018-01-01 RX ADMIN — MELATONIN 6 MG: 3 TAB ORAL at 21:16

## 2018-01-01 RX ADMIN — CARBIDOPA AND LEVODOPA 1.5 TABLET: 25; 100 TABLET ORAL at 08:32

## 2018-01-01 RX ADMIN — LIGHT MINERAL OIL, WHITE PETROLATUM: 150; 850 OINTMENT OPHTHALMIC at 21:00

## 2018-01-01 RX ADMIN — CARBIDOPA AND LEVODOPA 1 TABLET: 25; 100 TABLET, EXTENDED RELEASE ORAL at 12:43

## 2018-01-01 RX ADMIN — ENOXAPARIN SODIUM 30 MG: 30 INJECTION SUBCUTANEOUS at 09:50

## 2018-01-01 RX ADMIN — CARBIDOPA AND LEVODOPA 1 TABLET: 25; 100 TABLET, EXTENDED RELEASE ORAL at 08:30

## 2018-01-01 RX ADMIN — CARBIDOPA AND LEVODOPA 1 TABLET: 25; 100 TABLET, EXTENDED RELEASE ORAL at 16:32

## 2018-01-01 RX ADMIN — LIDOCAINE HYDROCHLORIDE 50 MG: 10 INJECTION, SOLUTION INFILTRATION; PERINEURAL at 12:10

## 2018-01-01 RX ADMIN — SODIUM CHLORIDE: 0.9 INJECTION, SOLUTION INTRAVENOUS at 12:00

## 2018-01-01 RX ADMIN — LIGHT MINERAL OIL, WHITE PETROLATUM: 150; 850 OINTMENT OPHTHALMIC at 21:19

## 2018-01-01 RX ADMIN — CARBIDOPA AND LEVODOPA 1.5 TABLET: 25; 100 TABLET ORAL at 14:08

## 2018-01-01 RX ADMIN — MELATONIN 6 MG: 3 TAB ORAL at 21:00

## 2018-01-01 RX ADMIN — AZELASTINE HYDROCHLORIDE 1 SPRAY: 137 SPRAY, METERED NASAL at 12:21

## 2018-01-01 RX ADMIN — CEFTRIAXONE 1000 MG: 1 INJECTION, SOLUTION INTRAVENOUS at 19:56

## 2018-01-01 RX ADMIN — CARBIDOPA AND LEVODOPA 1.5 TABLET: 25; 100 TABLET ORAL at 12:43

## 2018-01-01 RX ADMIN — LEVOTHYROXINE SODIUM 50 MCG: 50 TABLET ORAL at 06:41

## 2018-01-01 RX ADMIN — IOHEXOL 100 ML: 350 INJECTION, SOLUTION INTRAVENOUS at 14:15

## 2018-01-01 RX ADMIN — CEFTRIAXONE 1000 MG: 1 INJECTION, SOLUTION INTRAVENOUS at 19:54

## 2018-01-01 RX ADMIN — CARBIDOPA AND LEVODOPA 1 TABLET: 25; 100 TABLET, EXTENDED RELEASE ORAL at 16:43

## 2018-01-01 RX ADMIN — ENOXAPARIN SODIUM 30 MG: 30 INJECTION SUBCUTANEOUS at 08:26

## 2018-01-01 RX ADMIN — AZELASTINE HYDROCHLORIDE 1 SPRAY: 137 SPRAY, METERED NASAL at 08:32

## 2018-01-01 RX ADMIN — CEFAZOLIN SODIUM 1000 MG: 1 SOLUTION INTRAVENOUS at 03:56

## 2018-01-01 RX ADMIN — CARBIDOPA AND LEVODOPA 1 TABLET: 25; 100 TABLET, EXTENDED RELEASE ORAL at 16:37

## 2018-01-01 RX ADMIN — CARBIDOPA AND LEVODOPA 1 TABLET: 25; 100 TABLET, EXTENDED RELEASE ORAL at 14:09

## 2018-01-01 RX ADMIN — CARBIDOPA AND LEVODOPA 1 TABLET: 25; 100 TABLET, EXTENDED RELEASE ORAL at 13:07

## 2018-01-01 RX ADMIN — LEVOTHYROXINE SODIUM 50 MCG: 50 TABLET ORAL at 06:26

## 2018-01-01 RX ADMIN — DEXTROSE 75 ML/HR: 5 SOLUTION INTRAVENOUS at 08:27

## 2018-01-01 RX ADMIN — SODIUM CHLORIDE 500 ML: 0.9 INJECTION, SOLUTION INTRAVENOUS at 18:10

## 2018-01-01 RX ADMIN — CARBIDOPA AND LEVODOPA 1 TABLET: 25; 100 TABLET, EXTENDED RELEASE ORAL at 09:49

## 2018-01-01 RX ADMIN — PROPOFOL 110 MG: 10 INJECTION, EMULSION INTRAVENOUS at 12:14

## 2018-01-01 RX ADMIN — SALINE NASAL SPRAY 1 SPRAY: 1.5 SOLUTION NASAL at 21:18

## 2018-01-01 RX ADMIN — OXYCODONE HYDROCHLORIDE AND ACETAMINOPHEN 500 MG: 500 TABLET ORAL at 08:27

## 2018-01-01 RX ADMIN — LEVOTHYROXINE SODIUM 50 MCG: 50 TABLET ORAL at 05:31

## 2018-01-01 RX ADMIN — CEFAZOLIN 1000 MG: 1 INJECTION, POWDER, FOR SOLUTION INTRAVENOUS at 12:16

## 2018-01-01 RX ADMIN — OXYCODONE HYDROCHLORIDE AND ACETAMINOPHEN 500 MG: 500 TABLET ORAL at 08:06

## 2018-01-01 RX ADMIN — CEFAZOLIN SODIUM 1000 MG: 1 SOLUTION INTRAVENOUS at 15:16

## 2018-01-01 RX ADMIN — CARBIDOPA AND LEVODOPA 1.5 TABLET: 25; 100 TABLET ORAL at 09:48

## 2018-01-01 RX ADMIN — ONDANSETRON 4 MG: 2 INJECTION INTRAMUSCULAR; INTRAVENOUS at 12:28

## 2018-01-01 RX ADMIN — ENOXAPARIN SODIUM 30 MG: 30 INJECTION SUBCUTANEOUS at 08:06

## 2018-01-01 RX ADMIN — CARBIDOPA AND LEVODOPA 1.5 TABLET: 25; 100 TABLET ORAL at 07:54

## 2018-01-01 RX ADMIN — CARBIDOPA AND LEVODOPA 1 TABLET: 25; 250 TABLET ORAL at 16:37

## 2018-01-01 RX ADMIN — CARBIDOPA AND LEVODOPA 1 TABLET: 25; 100 TABLET, EXTENDED RELEASE ORAL at 12:22

## 2018-01-01 RX ADMIN — FENTANYL CITRATE 50 MCG: 50 INJECTION, SOLUTION INTRAMUSCULAR; INTRAVENOUS at 12:10

## 2018-01-01 RX ADMIN — CARBIDOPA AND LEVODOPA 1 TABLET: 25; 100 TABLET, EXTENDED RELEASE ORAL at 08:32

## 2018-01-01 RX ADMIN — CARBIDOPA AND LEVODOPA 1.5 TABLET: 25; 100 TABLET ORAL at 16:21

## 2018-01-01 RX ADMIN — CARBIDOPA AND LEVODOPA 1 TABLET: 25; 100 TABLET, EXTENDED RELEASE ORAL at 20:39

## 2018-01-01 RX ADMIN — LEVOTHYROXINE SODIUM 50 MCG: 50 TABLET ORAL at 05:46

## 2018-01-01 RX ADMIN — OXYCODONE HYDROCHLORIDE AND ACETAMINOPHEN 500 MG: 500 TABLET ORAL at 09:48

## 2018-01-01 RX ADMIN — CARBIDOPA AND LEVODOPA 1.5 TABLET: 25; 100 TABLET ORAL at 08:35

## 2018-01-01 RX ADMIN — LIGHT MINERAL OIL, WHITE PETROLATUM: 150; 850 OINTMENT OPHTHALMIC at 21:16

## 2018-01-01 RX ADMIN — CEFAZOLIN SODIUM 1000 MG: 1 SOLUTION INTRAVENOUS at 20:39

## 2018-01-01 RX ADMIN — SALINE NASAL SPRAY 1 SPRAY: 1.5 SOLUTION NASAL at 21:16

## 2018-01-01 RX ADMIN — CARBIDOPA AND LEVODOPA 1.5 TABLET: 25; 100 TABLET ORAL at 16:31

## 2018-01-01 RX ADMIN — CARBIDOPA AND LEVODOPA 1 TABLET: 25; 100 TABLET, EXTENDED RELEASE ORAL at 20:27

## 2018-01-01 RX ADMIN — DEXTROSE 75 ML/HR: 5 SOLUTION INTRAVENOUS at 00:33

## 2018-01-01 RX ADMIN — DEXTROSE 50 ML/HR: 5 SOLUTION INTRAVENOUS at 14:16

## 2018-01-01 RX ADMIN — CARBIDOPA AND LEVODOPA 1 TABLET: 25; 100 TABLET, EXTENDED RELEASE ORAL at 17:16

## 2018-01-01 RX ADMIN — CARBIDOPA AND LEVODOPA 1 TABLET: 25; 100 TABLET, EXTENDED RELEASE ORAL at 07:55

## 2018-01-01 RX ADMIN — CARBIDOPA AND LEVODOPA 1.5 TABLET: 25; 100 TABLET ORAL at 17:16

## 2018-01-01 RX ADMIN — OXYCODONE HYDROCHLORIDE AND ACETAMINOPHEN 500 MG: 500 TABLET ORAL at 08:33

## 2018-01-13 VITALS
HEART RATE: 66 BPM | DIASTOLIC BLOOD PRESSURE: 70 MMHG | SYSTOLIC BLOOD PRESSURE: 132 MMHG | BODY MASS INDEX: 25.96 KG/M2 | RESPIRATION RATE: 16 BRPM | WEIGHT: 137.5 LBS | HEIGHT: 61 IN

## 2018-01-13 VITALS
WEIGHT: 129.06 LBS | DIASTOLIC BLOOD PRESSURE: 69 MMHG | HEART RATE: 85 BPM | SYSTOLIC BLOOD PRESSURE: 149 MMHG | BODY MASS INDEX: 24.39 KG/M2 | OXYGEN SATURATION: 92 %

## 2018-01-14 VITALS
BODY MASS INDEX: 24.75 KG/M2 | RESPIRATION RATE: 16 BRPM | WEIGHT: 131 LBS | DIASTOLIC BLOOD PRESSURE: 80 MMHG | HEART RATE: 68 BPM | SYSTOLIC BLOOD PRESSURE: 142 MMHG | OXYGEN SATURATION: 92 %

## 2018-01-15 ENCOUNTER — ALLSCRIPTS OFFICE VISIT (OUTPATIENT)
Dept: OTHER | Facility: OTHER | Age: 83
End: 2018-01-15

## 2018-01-16 NOTE — PROGRESS NOTES
Assessment   1  Parkinson's disease (332 0) (G20)    Plan   Parkinson's disease    · From  Carbidopa-Levodopa ER  MG Oral Tablet Extended Release    Take 1 tablet 4 times daily To Carbidopa-Levodopa ER  MG Oral Tablet    Extended Release Take 1 tablet 4 times daily and 1 mid night if up   Rx By: [facility]; Dispense: 90 Days ; #:450 Tablet Extended Release; Refill: 1;For: Parkinson's disease; MENG = N; [facility]: [facility]  ([address]; Msg to Pharmacy: Med is on backorder with [facility]  · Selegiline HCl - 5 MG Oral Capsule; take 1 capsule daily   Rx By: [facility]; Dispense: 0 Days ; #:90 Capsule; Refill: 1;For: Parkinson's disease; MENG = N; Sent To: [facility]    Discussion/Summary   Discussion Summary:    Parkinson disease with wearing off at 3 5 to 4 hours and episodes of freezing anytime of day  She is no longer on selegiline  She seems to retry it and then stop as as she feels is on too many medications  We spoke about medications and options  She wishes to retry selegiline  Will restart 5mg qam and will also take an extra Sinemet ER middle of the night when up as she has a 12 hours night and is constantly up to use the restroom  If this does not work she will increase Sinemet 25/100 to 2 tabs tid along with Sinemet ER 1 tab qid  Counseling Documentation With Imm: The patient, patient's family was counseled regarding patient and family education,-- impressions,-- risks and benefits of treatment options  Medication SE Review and Pt Understands Tx: Possible side effects of new medications were reviewed with the patient/guardian today  The treatment plan was reviewed with the patient/guardian   The patient/guardian understands and agrees with the treatment plan      Chief Complaint   Chief Complaint Free Text Note Form: Patient present for follow-up appointment regarding Parkinsons      History of Present Illness   HPI: Adriana Arndt is an 80year old woman with Parkinsonâs disease who presents for follow up  To review, she was diagnosed with Parkinsonâs disease 12 years ago at [facility]  She saw several neurologist prior to and after this diagnosis  She followed with her PCP in the past and he added Sinemet ER  She did not have improvement on Selegiline in the past and this was discontinued  her last visit she was having more issues with wearing off and her Sinemet 25/100mg was increased to 1 5tabs tid (8:30am, 12:30, 4:30), along with Sinemet ER 1 po qid  She was selegiline 5mg daily but stoppped it again  She reports having shakiness and feeling lightheaded right before she take levodopa  She will feel weak and shaky when she goes to bed at 7:30- 8pm     is able to perform all of her ADLs independently but slowly    She is eating well and denies any issues with swallowing  She uses the walker at all times  Last fall was a couple of months ago while going up the stairs  She will freeze up when turning and notices this more on the left  Freezing can occur on initiation or when getting in and out of a car  Review of Systems   Neurological ROS:      Constitutional: fatigue  HEENT:  no sinus problems, not feeling congested, no blurred vision, no dryness of the eyes, no eye pain, no hearing loss, no tinnitus, no mouth sores, no sore throat, no hoarseness, no dysphagia, no masses, no bleeding  Cardiovascular:  no chest pain or pressure, no palpitations present, the heart rate was not rapid or irregular, no swelling in the arms or legs, no poor circulation  Respiratory:  no unusual or persistant cough, no shortness of breath with or without exertion  Gastrointestinal:  no nausea, no vomiting, no diarrhea, no abdominal pain, no changes in bowel habits, no melena, no loss of bowel control  Genitourinary:  no incontinence, no feelings of urinary urgency, no increase in frequency, no urinary hesitancy, no dysuria, no hematuria  Musculoskeletal: arthralgias  Integumentary  no masses, no rash, no skin lesions, no livedo reticularis  Psychiatric:  no anxiety, no depression, no mood swings, no psychiatric hospitalizations, no sleep problems  Endocrine  no unusual weight loss or gain, no excessive urination, no excessive thirst, no hair loss or gain, no hot or cold intolerance, no menstrual period change or irregularity, no loss of sexual ability or drive, no erection difficulty, no nipple discharge  Hematologic/Lymphatic:  no unusual bleeding, no tendency for easy bruising, no clotting skin or lumps  Neurological General: waking up at night  Neurological Mental Status:  no confusion, no mood swings, no alteration or loss of consciousness, no difficulty expressing/understanding speech, no memory problems  Neurological Cranial Nerves:  no blurry or double vision, no loss of vision, no face drooping, no facial numbness or weakness, no taste or smell loss/changes, no hearing loss or ringing, no vertigo or dizziness, no dysphagia, no slurred speech  Neurological Motor findings include:  no tremor, no twitching, no cramping(pre/post exercise), no atrophy  Neurological Coordination:  no unsteadiness, no vertigo or dizziness, no clumsiness, no problems reaching for objects  Neurological Sensory:  no numbness, no pain, no tingling, does not fall when eyes closed or taking a shower  Neurological Gait: difficulty walking  ROS Reviewed:    ROS reviewed  Active Problems   1  Difficulty in walking (719 7) (R26 2)   2  Lower back pain (724 2) (M54 5)   3  Lumbar degenerative disc disease (722 52) (M51 36)   4  Parkinson's disease (332 0) (G20)   5  Thyroid disorder (246 9) (E07 9)    Past Medical History   Active Problems And Past Medical History Reviewed: The active problems and past medical history were reviewed and updated today  Surgical History   Surgical History Reviewed:     The surgical history was reviewed and updated today  Family History   Mother    1  Family history of cardiac disorder (V17 49) (Z82 49)  Father    2  Family history of malignant neoplasm (V16 9) (Z80 9)  Brother    3  Family history of cardiac disorder (V17 49) (Z82 49)  Family History    4  Family history of cardiac disorder (V17 49) (Z82 49)   5  Family history of malignant neoplasm (V16 9) (Z80 9)   6  Family history of thyroid disease (V18 19) (Z80 46)    Social History    · Never a smoker   · No alcohol use   · No drug use  Social History Reviewed: The social history was reviewed and updated today  Current Meds    1  Ennis Thyroid 15 MG Oral Tablet; Therapy: [id number] to Recorded   2  Aspirin 81 MG CAPS; take 1 capsule daily; Therapy: (Recorded:[id number]) to Recorded   3  Carbidopa-Levodopa ER  MG Oral Tablet Extended Release; Take 1 tablet 4     times daily  Requested for: [id number]; Last Rx:[id number] Ordered   4  Progesterone Micronized 200 MG Oral Capsule; TAKE 1 CAPSULE DAILY; Therapy: (Recorded:[id number]) to Recorded   5  Selegiline HCl - 5 MG Oral Capsule; take 1 capsule daily; Therapy: (Recorded:[id number]) to Recorded  Medication List Reviewed: The medication list was reviewed and updated today  Allergies   1  No Known Drug Allergies    Vitals   Signs   Recorded: [id number] 03:39PM   Heart Rate: 78  Respiration: 16  Systolic: 325, LUE, Standing  Diastolic: 74, LUE, Standing  Height: 5 ft 1 in  Weight: 132 lb 9 oz  BMI Calculated: 25 05  BSA Calculated: 1 59  Heart Rate: 75  Systolic: 022, LUE, Sitting  Diastolic: 82, LUE, Sitting    Physical Exam        Constitutional      General appearance: Abnormal  -- (Moderate hypomimia b2  Mild hypophonia 1  Decreased blinking)      Eyes      Ophthalmoscopic examination: Vision is grossly normal  Gross visual field testing by confrontation shows no abnormalities  EOMI in both eyes  Conjunctivae clear   Eyelids normal palpebral fissures equal  Orbits exhibit normal position  No discharge from the eyes  PERRL  Musculoskeletal      Gait and station: Abnormal  -- (Able to rise from the chair using hands 1  Moderately stooped posture with left tilt  Shortened stride on left with walker, no freezing 3 pull test deferred)      Muscle strength: Normal strength throughout  Muscle tone: Abnormal  -- (Mild rigidity bilaterally 1,1 none in legs)      Involuntary movements: Abnormal involuntary movements were observed  -- (Mild to moderate bradykinesia on finger taps 2,2 with some decrement, handgrips 1,1 ACACIA 0,1, HT 1,1  No dyskinesia  Tendency to hold the left hand slightly bent  No dyskinesias)      Neurologic      Orientation to person, place, and time: Normal        Recent and remote memory: Demonstrates normal memory  Attention span and concentration: Normal thought process and attention span  Language: Names objects, able to repeat phrases and speaks spontaneously  Fund of knowledge: Normal vocabulary with appropriate knowledge of current events and past history  2nd cranial nerve: Normal        3rd, 4th, and 6th cranial nerves: Normal        5th cranial nerve: Normal        7th cranial nerve: Normal        8th cranial nerve: Normal        9th cranial nerve: Normal        11th cranial nerve: Normal        12th cranial nerve: Normal        Sensation: Normal   Sensory exam: intact to light touch,-- intact pain and temperature sensation-- and-- intact vibration sensation  Reflexes: Normal   Deep tendon reflexes: 2+ right biceps-- and-- 2+ left biceps  palmomental reflex present bilaterally (positive glabellar)        Coordination: Abnormal        Mood and affect: Normal        Signatures    Electronically signed by : Frankie Chauhan MD; Charles 15 2018  4:12PM EST                       (Author)

## 2018-01-23 VITALS
RESPIRATION RATE: 16 BRPM | HEART RATE: 78 BPM | HEIGHT: 61 IN | WEIGHT: 132.56 LBS | SYSTOLIC BLOOD PRESSURE: 142 MMHG | BODY MASS INDEX: 25.03 KG/M2 | DIASTOLIC BLOOD PRESSURE: 78 MMHG

## 2018-05-22 ENCOUNTER — OFFICE VISIT (OUTPATIENT)
Dept: NEUROLOGY | Facility: CLINIC | Age: 83
End: 2018-05-22
Payer: MEDICARE

## 2018-05-22 VITALS
HEIGHT: 62 IN | WEIGHT: 125 LBS | HEART RATE: 78 BPM | SYSTOLIC BLOOD PRESSURE: 130 MMHG | BODY MASS INDEX: 23 KG/M2 | DIASTOLIC BLOOD PRESSURE: 70 MMHG

## 2018-05-22 DIAGNOSIS — G20 PARKINSON'S DISEASE (HCC): Primary | Chronic | ICD-10-CM

## 2018-05-22 PROCEDURE — 99214 OFFICE O/P EST MOD 30 MIN: CPT | Performed by: PHYSICIAN ASSISTANT

## 2018-05-22 RX ORDER — CARBIDOPA AND LEVODOPA 25; 100 MG/1; MG/1
TABLET, EXTENDED RELEASE ORAL
Qty: 150 TABLET | Refills: 3 | Status: SHIPPED | OUTPATIENT
Start: 2018-05-22 | End: 2018-01-01

## 2018-05-22 NOTE — PATIENT INSTRUCTIONS
Will increase Sinemet to 2tabs three times a day along with Sinemet CR 1tab three times a day and 2tabs before bed

## 2018-05-22 NOTE — PROGRESS NOTES
Patient ID: Driss Abreu is a 80 y o  female  Assessment/Plan:    Parkinson's disease ([facility] [address] )  Patient with some wearing off between doses  She was unable to tolerate Selegiline due to GI issues and denies any improvement when on it  She continues to have left greater than right bradykinesia, rigidity and some freezing of the left leg  She also has difficulty with moving in bed in the middle of the night  Will have her increase her Sinemet dose to 2tabs tid along with Sinemet CR 1tab tid and 2tabs qhs  If she is still having issues in the middle of the night then may consider an extra tab of Sinemet when she wakes  She was also encouraged to remain active  She is still in PT  Subjective:      Driss Abreu is an 80year old woman with Parkinson's disease who presents for follow up  To review, she was diagnosed with Parkinson's disease 12 years ago at [facility]  She saw several neurologist prior to and after this diagnosis  She followed with her PCP in the past and he added Sinemet ER  She did not have improvement on Selegiline in the past and this was discontinued given the patient felt she was on too many medications  At her last visit she was having wearing off with episodes of freezing  She wanted to retry Selegiline and she was also told to take an extra dose of Sinemet CR in the middle of the night  She is currently taking Sinemet 25/100mg 1 5tabs tid (8:30am, 12:30, 4:30), along with Sinemet ER 1 po tid  She had an upset stomach with the Selegiline even after a few weeks on it  She denies having any clear improvement with it however she was only on it for a short period for time  She has trouble moving in bed and can only lay on her right side  She needs assistance from her  to get on her right side  She had tried to take a dose Sinemet CR before bed however this did not seem to help  She will feel the medication kick in after about 30 minutes    When on she is able to function better  She feels on for about 3 5 hours and then the medication wears off  If she is late for a dose of medication she will feel sick to her stomach  She uses the walker at all times  She has had some falls  She is currently in PT 3 times a week  Her  will exercises her legs every morning  She is able to dress and shower on her own  Her appetite fluctuates  She will have some trouble with swallowing when she gets phlegm buildup  No hallucinations  Total time spent today 35 minutes  Greater than 50% of total time was spent with the patient and / or family counseling and / or coordination of care         Objective:    Blood pressure 130/70, pulse 78, height 5' 2" (1 575 m), weight 56 7 kg (125 lb)  Physical Exam   Constitutional: She appears well-developed and well-nourished  Eyes: EOM are normal  Pupils are equal, round, and reactive to light  Neurological Exam    Mental Status  The patient is alert and oriented to person, place, time, and situation  She has normal attention span and concentration  Cranial Nerves    CN II: The patient's visual acuity and visual fields are normal   CN III, IV, VI: The patient's pupils are equally round and reactive to light and ocular movements are normal   CN V: The patient has normal facial sensation  CN VII:  The patient has symmetric facial movement  CN VIII:  The patient's hearing is normal   CN IX, X: The patient has symmetric palate movement and normal gag reflex  CN XI: The patient's shoulder shrug strength is normal   CN XII: The patient's tongue is midline without atrophy or fasciculations  Motor    No resting tremor  No action tremors  Mild to moderate bradykinesia with finger taps 1,2, hand  1,2, ACACIA 1,1, heel taps 1,2  No dyskinesia or dystonia  Moderate hypomimia  Mild hypophonia  Mild rigidity on the left 1, normal on the right  Mild to moderate generalized bradykinesia         Sensory  The patient's sensation is to light touch  Reflexes  She has glabellar tap release signs present  She has no palmomental release signs  Gait and Coordination    Arose using hands  Slow to rise  Ambulating with walker  Shortened stride bilaterally  Freezing of the left leg with turns  Pull test deferred  ROS:    Review of Systems   Constitutional: Negative for appetite change and fever  HENT: Positive for trouble swallowing and voice change  Negative for hearing loss and tinnitus  Eyes: Negative  Negative for photophobia and pain  Respiratory: Positive for choking and shortness of breath  Cardiovascular: Negative  Negative for palpitations  Gastrointestinal: Positive for constipation  Negative for nausea and vomiting  Endocrine: Negative  Negative for cold intolerance and heat intolerance  Genitourinary: Positive for frequency and urgency  Negative for dysuria  Musculoskeletal: Positive for arthralgias, back pain and gait problem  Negative for myalgias and neck pain  Skin: Negative  Negative for rash  Neurological: Positive for facial asymmetry and weakness  Negative for dizziness, tremors, seizures, syncope, speech difficulty, light-headedness, numbness and headaches  Hematological: Negative  Does not bruise/bleed easily  Psychiatric/Behavioral: Positive for sleep disturbance  Negative for confusion and hallucinations

## 2018-05-22 NOTE — ASSESSMENT & PLAN NOTE
Patient with some wearing off between doses  She was unable to tolerate Selegiline due to GI issues and denies any improvement when on it  She continues to have left greater than right bradykinesia, rigidity and some freezing of the left leg  She also has difficulty with moving in bed in the middle of the night  Will have her increase her Sinemet dose to 2tabs tid along with Sinemet CR 1tab tid and 2tabs qhs  If she is still having issues in the middle of the night then may consider an extra tab of Sinemet when she wakes  She was also encouraged to remain active    She is still in PT

## 2018-07-12 ENCOUNTER — TELEPHONE (OUTPATIENT)
Dept: NEUROLOGY | Facility: CLINIC | Age: 83
End: 2018-07-12

## 2018-07-12 NOTE — TELEPHONE ENCOUNTER
If she has not had any recent therapy I would recommend a referral to help with her gait and ways to reduce the freezing  If amenable I can place the order  Thanks!

## 2018-07-12 NOTE — TELEPHONE ENCOUNTER
Within the past 2 weeks, having trouble walking, has fallen twice (one time at 4pm and the other time around 3pm)  When getting out of bed she freezes, difficulty getting her walking started  Hesitation in walking happening at different times  No recent infection/illness      States she "didn't feel right" on the increased dose, was having difficulty sleeping went back to the following dosin 5 tabs regular Sinemet and 1 tab ER at 8:30am, 12:30, 4:30pm     Call home phone first  [phone] cell phone

## 2018-07-13 NOTE — TELEPHONE ENCOUNTER
Pt currently goes to PT 3x week   states he is unsure if PT knows about these symptoms, a caregiver takes her to PT    Pt has a Hx of falling a long time ago but lately she seems weaker in general, more than just the 2 falls episodes  Pt does not appear confused, not fatigued, no other symptoms, just the B/le weakness    Marie Echevarria w/PT states pt appears @baseline, no significant B/le weakness, states pt has r/shoulder bursitis, was seen @ortho, they think maybe the shoulder is causing her issues  Riveramona Echevarria states her fall was after PT & she may have been fatigued from PT & was getting into a different car than usual for her so maybe that caused the fall  PT thinks she is walking better    She is doing a modified big &loud program    Jacinto gillette PT: [phone]  Please advise

## 2018-07-20 NOTE — TELEPHONE ENCOUNTER
Pt's  called again re: below, asking for advise what he should do  He states that pt is getting weaker and weaker  Pt amb w/ cane and 1 person assist   Had 2 episode of falls a week ago and multiple near falls 2 days ago                [phone]

## 2018-07-20 NOTE — TELEPHONE ENCOUNTER
pt's  made aware  she has an appt in a couple of days with the pcp   he states that she had labs done about a month ago and no signs of infection  he is agreeable to appt tuesday if possibly  he requests an afternoon appt if possible    earliest time would be 12:30pm

## 2018-07-20 NOTE — TELEPHONE ENCOUNTER
For weakness and balance PT would be the most beneficial   Did she have a workup with the PCP to make sure there was not an infection causes these changes? I would not recommend further increase of the medication at this time for these complaints  Please call and let the  know that I recommend continued therapy at this time, can try and bring her in for a follow up in our office next Tuesday if I am still in  Will have Gali Dixon call them to schedule this on Monday  When complete please forward to Gali Dixon so that she can schedule  Thanks!

## 2018-07-24 ENCOUNTER — OFFICE VISIT (OUTPATIENT)
Dept: NEUROLOGY | Facility: CLINIC | Age: 83
End: 2018-07-24
Payer: MEDICARE

## 2018-07-24 ENCOUNTER — TELEPHONE (OUTPATIENT)
Dept: NEUROLOGY | Facility: CLINIC | Age: 83
End: 2018-07-24

## 2018-07-24 VITALS
SYSTOLIC BLOOD PRESSURE: 109 MMHG | DIASTOLIC BLOOD PRESSURE: 59 MMHG | WEIGHT: 118 LBS | BODY MASS INDEX: 21.71 KG/M2 | HEART RATE: 76 BPM | HEIGHT: 62 IN

## 2018-07-24 DIAGNOSIS — R63.4 WEIGHT LOSS: ICD-10-CM

## 2018-07-24 DIAGNOSIS — G20 PARKINSON'S DISEASE (HCC): Primary | Chronic | ICD-10-CM

## 2018-07-24 PROCEDURE — 99214 OFFICE O/P EST MOD 30 MIN: CPT | Performed by: PHYSICIAN ASSISTANT

## 2018-07-24 RX ORDER — ENTACAPONE 200 MG/1
200 TABLET ORAL 3 TIMES DAILY
Qty: 90 TABLET | Refills: 3 | Status: SHIPPED | OUTPATIENT
Start: 2018-07-24 | End: 2018-08-15 | Stop reason: SDUPTHER

## 2018-07-24 RX ORDER — TROSPIUM CHLORIDE ER 60 MG/1
1 CAPSULE ORAL DAILY
Refills: 0 | COMMUNITY
Start: 2018-07-19 | End: 2018-10-25

## 2018-07-24 NOTE — ASSESSMENT & PLAN NOTE
She has been having some recent weight loss  She is not eating as much as she did in the past   She was encouraged to try and add some snacks throughout the day  Also discussed the addition of a supplement such as Boost or Ensure  She would like to discuss this further with her PCP as well  She is scheduled to see them in a few days

## 2018-07-24 NOTE — ASSESSMENT & PLAN NOTE
Patient has been feeling weaker recently with some falls over the past few weeks  She does feel that her gait and ambulation is improved after taking a dose of Sinemet however she continues to have some wearing off prior to her next dose  She had increased sleepiness with higher doses of Sinemet in the past   Will instead have her try and add a dose of Comtan to each of her Sinemet doses to see if this helps reduce the wearing off  If no improvement of side effects then may consider increasing to qid dosing  No clear change on her exam today  She was encouraged to remain active and continue with the PT exercises

## 2018-07-24 NOTE — PROGRESS NOTES
Patient ID: Tatiana Colmenares is a 80 y o  female  Assessment/Plan:    Parkinson's disease ([facility] [address] )  Patient has been feeling weaker recently with some falls over the past few weeks  She does feel that her gait and ambulation is improved after taking a dose of Sinemet however she continues to have some wearing off prior to her next dose  She had increased sleepiness with higher doses of Sinemet in the past   Will instead have her try and add a dose of Comtan to each of her Sinemet doses to see if this helps reduce the wearing off  If no improvement of side effects then may consider increasing to qid dosing  No clear change on her exam today  She was encouraged to remain active and continue with the PT exercises  Weight loss  She has been having some recent weight loss  She is not eating as much as she did in the past   She was encouraged to try and add some snacks throughout the day  Also discussed the addition of a supplement such as Boost or Ensure  She would like to discuss this further with her PCP as well  She is scheduled to see them in a few days  Subjective:    Tatiana Colmenares is an 80year old woman with Parkinson's disease who presents for follow up  To review, she was diagnosed with Parkinson's disease 12 years ago at [facility]  She saw several neurologist prior to and after this diagnosis  She followed with her PCP in the past and he added Sinemet ER  She did not have improvement on Selegiline in the past and this was discontinued given the patient felt she was on too many medications and she had some GI side effects with recent retrial      At her last visit she was having some wearing off and her Sinemet was increased to 2tabs tid (8:30am, 12:30, 4:30) along with Sinemet CR 1tab tid and 2tabs qhs  Her  has called a few times recently with concerns that she is getting weaker and falling more often  She continues to have issues with freezing when initiating gait  She did not "feel right" on the higher Sinemet dose and has returned to Sinemet 1 5 tabs along with 1tab of Sinemet ER at 8:30am, 12:30, 4:30pm   She remains in PT and nursing spoke with the therapist who has not noticed any specific LE weakness and felt that the patient has been walking better  She was brought in for an evaluation today given her 's continued concerns for her weakness  She had a fall about 2 weeks when she was walking through a doorway  She also had a fall when she was trying to get into her daughters car  She felt that she got very weak and couldn't lift herself into the car  She has had a few near falls over the past 2-3 weeks as well  She is currently going to PT  She has a walker and a cane that she uses when ambulating  She is using the walker more often than she was in the past   She feels overall weaker over the past few weeks  No clear worsening of the freezing episodes  She is still having issues with the left leg getting stuck when trying to start walking  It seems to help when she tries to count  She felt that she was more tired when taking the higher Sinemet dose and she did not feel she was any better  She does feel that the medication helps with there walking and she will feel the medication wear off about 30 minutes prior to her next dose  She has been having some recent weight loss which is concerning for the her   She went from 125 pounds in 5/2018 to 118 pounds today  She does not feel that she is eating as much as she was in the past however she feels this is because she is not as active  She is able to perform her ADLs on her own  She has some trouble staying asleep due to increased urination in the middle of the night  Total time spent today 40 minutes   Greater than 50% of total time was spent with the patient and / or family counseling and / or coordination of care       Objective:    Blood pressure 109/59, pulse 76, height 5' 2" (1 575 m), weight 53 5 kg (118 lb)  Physical Exam   Constitutional: She appears well-developed and well-nourished  Eyes: EOM are normal  Pupils are equal, round, and reactive to light  Neurological Exam    Mental Status  The patient is alert  She has normal attention span and concentration  Slow to respond at times  Cranial Nerves    CN II: The patient's visual acuity and visual fields are normal   CN III, IV, VI: The patient's pupils are equally round and reactive to light and ocular movements are normal   CN V: The patient has normal facial sensation  CN VII:  The patient has symmetric facial movement  CN VIII:  The patient's hearing is normal   CN IX, X: The patient has symmetric palate movement and normal gag reflex  CN XI: The patient's shoulder shrug strength is normal   CN XII: The patient's tongue is midline without atrophy or fasciculations  Motor    No resting tremor of the hands  No action tremors  Mild chin tremor  Mild to moderate bradykinesia with finger taps 1,1, hand  1,2, ACACIA 1,1, heel taps 1,2  No dyskinesia or dystonia  Moderate hypomimia  Mild hypophonia  No rigidity noted  Mild generalized bradykinesia  Sensory  The patient's sensation is to light touch  Reflexes  She has glabellar tap and palmomental (On the right  ) release signs present  Gait and Coordination    Arose using hands  Slow to rise  Ambulating with walker  Freezing of the left leg with turns otherwise good stride with the walker  Pull test deferred  ROS:    Review of Systems   Constitutional: Positive for appetite change and fatigue  Negative for fever  HENT: Positive for trouble swallowing and voice change  Negative for hearing loss and tinnitus  Eyes: Negative  Negative for photophobia and pain  Respiratory: Positive for cough, choking and shortness of breath  Cardiovascular: Negative  Negative for palpitations     Gastrointestinal: Positive for constipation  Negative for nausea and vomiting  Endocrine: Negative  Negative for cold intolerance and heat intolerance  Genitourinary: Positive for frequency and urgency  Negative for dysuria  Musculoskeletal: Positive for arthralgias, back pain, gait problem and neck stiffness  Negative for myalgias and neck pain  Skin: Negative  Negative for rash  Neurological: Positive for tremors, facial asymmetry, speech difficulty and weakness  Negative for dizziness, seizures, syncope, light-headedness, numbness and headaches  Hematological: Bruises/bleeds easily  Psychiatric/Behavioral: Positive for sleep disturbance  Negative for confusion and hallucinations

## 2018-07-24 NOTE — TELEPHONE ENCOUNTER
Patient  called  [place] weakn 1 5 hours after 4:30 dose  Took extra 1/2 tab and while on  phone felt better  Plan start Comtan as prescribe  , when received  If happens again can take exrtra 1/2 tab Sinemet

## 2018-07-24 NOTE — PATIENT INSTRUCTIONS
Will try adding Comtan three times a day along with Sinemet to see if this helps with the wearing off

## 2018-08-13 NOTE — TELEPHONE ENCOUNTER
Pt's  Julisa Dalala called and states that Sean Mann recommended a new med (entacapone), rx sent 7/24/18  She states that he was given a pamphlet stating the this med can cause skin cancer  Julisa Denise is concern bec pt had a mole on her face, was removed end of last year  He was told by Dr Rusty Grajeda (surgeon) pt needs another surgery but pt cancelled it  Pt never start taking entacapone it   "Is it safe to take entacapone?"    [phone] [address])

## 2018-08-14 NOTE — TELEPHONE ENCOUNTER
It is safe to take entacapone  There is a reported increases incidence of melanoma in PD patient and patient on PD medications but causaility is not clear

## 2018-08-15 DIAGNOSIS — G20 PARKINSON'S DISEASE (HCC): Chronic | ICD-10-CM

## 2018-08-16 RX ORDER — ENTACAPONE 200 MG/1
200 TABLET ORAL 3 TIMES DAILY
Qty: 270 TABLET | Refills: 1 | Status: SHIPPED | OUTPATIENT
Start: 2018-08-16 | End: 2018-10-25

## 2018-09-14 DIAGNOSIS — G20 PARKINSON'S DISEASE (HCC): Chronic | ICD-10-CM

## 2018-09-26 ENCOUNTER — APPOINTMENT (EMERGENCY)
Dept: RADIOLOGY | Facility: HOSPITAL | Age: 83
End: 2018-09-26
Payer: MEDICARE

## 2018-09-26 ENCOUNTER — HOSPITAL ENCOUNTER (EMERGENCY)
Facility: HOSPITAL | Age: 83
Discharge: HOME | End: 2018-09-26
Attending: EMERGENCY MEDICINE
Payer: MEDICARE

## 2018-09-26 VITALS
TEMPERATURE: 98.7 F | SYSTOLIC BLOOD PRESSURE: 174 MMHG | OXYGEN SATURATION: 95 % | RESPIRATION RATE: 18 BRPM | HEART RATE: 72 BPM | DIASTOLIC BLOOD PRESSURE: 78 MMHG
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
## 2018-09-26 PROCEDURE — 99283 EMERGENCY DEPT VISIT LOW MDM: CPT

## 2018-09-27 NOTE — ED PROVIDER NOTES
History  Chief Complaint   Patient presents with    Fall     pt fell on monday and is here with bilateral rib pain  denies hitting her head  no other complaints  Kerline Garcia ([id number]) is a 80 y o   female Elderly Adult patient, presenting to the Emergency Department, accompanied by , Son, Daughter, who presents with a chief complaint of Patient presents with: Fall: pt fell on monday and is here with bilateral rib pain  denies hitting her head  no other complaints  Patient is a 15-year-old female, seen with her , son, and daughter-in-law  The patient has a notable history of hypothyroidism and Parkinson's disease  The patient presents with chief complaint of status post fall with  Patient states she was in her normal state of health until this morning, when the patient had finished eating breakfast, patient benefit stable, and slipped on an omelette, which resulted in her falling forward, striking her anterior knees on the hardwood floor  The patient states when she fell she did not strike her head, but did, however, have trouble getting up off the floor, which required her daughter to wrap her arms around her in attempt to pick her up  As a result of this, the patient has had pain in her anterior inferior ribs, as well as her posterior ribs  The patient states that this pain has since subsided, and is not currently present  Currently, the patient states that she has no headache, chest pain, back pain, shortness of breath, abdominal pain, or other joint pain, with the exception of her knees, as mentioned above        Medications: Medications, as per nursing MAR  Allergies: No reported drug allergies, No reported food allergies, No reported environmental allergies  Overnight Hospitalizations: Unable to solicit prior hospitalization history due to patient condition  Vaccinations: Vaccinations UTD, as per Patient  Past Medical History: Past Medical History Includes: Hypothyroidism, Parkinson's  Past Surgical History: No relevant past surgical history    Code Status: Prior              Prior to Admission Medications   Prescriptions Last Dose Informant Patient Reported? Taking? Ascorbic Acid (VITAMIN C PO)   Yes No   Sig: Take 1 tablet by mouth daily   Cholecalciferol (VITAMIN D PO)   Yes No   Sig: Take 1 tablet by mouth daily   Cyanocobalamin (VITAMIN B 12 PO)   Yes No   Sig: Take 1 tablet by mouth daily   carbidopa-levodopa (SINEMET CR)  mg TBCR per ER tablet   No No   Sig: Take 1tab tid and 2tabs qhs   Patient taking differently: Take 1tab tid    carbidopa-levodopa (SINEMET)  mg per tablet   No No   Sig: TAKE ONE AND ONE-HALF TABLETS THREE TIMES A DAY   entacapone (COMTAN) 200 mg tablet   No No   Sig: Take 1 tablet (200 mg total) by mouth 3 (three) times a day   progesterone (PROMETRIUM) 200 MG capsule   Yes No   Sig: Take by mouth daily     thyroid desiccated (ARMOUR) 30 mg tablet   Yes No   Sig: Take 30 mg by mouth daily   trospium (SANCTURA XR) 60 mg 24 hr capsule   Yes No   Sig: Take 1 capsule by mouth daily      Facility-Administered Medications: None       Past Medical History:   Diagnosis Date    Hypothyroidism     Parkinson's disease ([facility] [address] )        History reviewed  No pertinent surgical history  Family History   Problem Relation Age of Onset    Hypertension Mother     Lung cancer Father      I have reviewed and agree with the history as documented  Social History   Substance Use Topics    Smoking status: Never Smoker    Smokeless tobacco: Never Used    Alcohol use No        Review of Systems  Review of Systems: The Patient/Parent Denies the following: Negative, Except as noted in HPI  Physical Exam  Physical Exam  General: 80 y o  female patient, who appears their stated age, in mild distress  Skin: No rashes, masses, or lesions noted  HEENT: Atraumatic & Normocephalic  External ears normal, with no noted abnormalities or deformities  Bilateral canals examined, without noted edema or discomfort  No pain while pulling the tragus  TM well visualized bilaterally, with no noted obstruction, effusion, erythema, or air fluid levels  No noted enlargement of the mastoid processes bilaterally  EOMI, PERRL, Conjunctiva without injection bilaterally  No conjunctival drainage noted bilaterally  Nares patent bilaterally, with no noted obstructions, erythema, or drainage  No noted rhinorrhea  Pharynx well visualized, with no exudate noted in the posterior pharynx  Tonsils are not enlarged  Gingival surfaces are within normal limits  Neck: Soft, supple, and non-tender  No enlargement of the anterior cervical, posterior cervical, or occipital lymph notes  Cardiac: Regular rate and rhythm, with no noted murmurs, rubs, or gallops  Pulmonary: Normal Appearance  Clear to auscultation, with no noted rales, rhonchi, or wheezes  Chest:  There is mild tenderness to palpation over the anterior ribs  There is no noted increased mobility of the ribs to any area  Additionally, there is currently no tenderness to the patient's ribs on direct palpation  Abdomen: Normal appearance  Dull to palpation, except over the gastric bubble, which was mildly tympanic  Bowel sounds were within normal limits, with no noted high pitch sounds heard  Negative Ruiz sign  No pain with palpation at [facility]  MSK: Joint ROM grossly normal, actively and passively, to all extremities  There is a small ecchymosis overlying the patient's right patella, with mild tenderness to palpation  Patellar movement was within normal limits  There is mild tenderness to palpation over the right patellar ligament  Range of motion both actively and passively was within normal limits  Neuro: Cranial nerves 2-12 grossly intact  Normal sensation grossly  Psych: Normal affect and responsiveness         Vital Signs  ED Triage Vitals [09/26/18 1842]   Temperature Pulse Respirations Blood Pressure SpO2   98 7 °F (37 1 °C) 72 18 (!) 174/78 95 %      Temp Source Heart Rate Source Patient Position - Orthostatic VS BP Location FiO2 (%)   Oral Monitor Sitting Left arm --      Pain Score       Worst Possible Pain           Vitals:    09/26/18 1842   BP: (!) 174/78   Pulse: 72   Patient Position - Orthostatic VS: Sitting       Visual Acuity      ED Medications  Medications - No data to display    Diagnostic Studies  Results Reviewed     None                 No orders to display              Procedures  Procedures       Phone Contacts  ED Phone Contact    ED Course                               MDM  Number of Diagnoses or Management Options  Fall, initial encounter: new and requires workup  Diagnosis management comments: During evaluation the patient, the patient has full recollection of the underlying events surrounding the fall, and, with corroboration of her family, the patient did not have loss of consciousness, or strike her head  The only area of concern for the patient right now, is her right anterior knee, which is providing her minimal discomfort, but has normal range of motion  After completing my assessment, it was determined that the patient should have a x-ray of the knee, to evaluate for potential with osseous abnormalities  The patient, as well as her family, had expressed interest in leaving the emergency department at this time, as they just wanted to be sure her chest was okay  They were advised that imaging was warranted, considering the patient's fall, as well as her history of osteoporosis, but, despite the risks, the patient's family, as well as the patient, opted to refuse the x-ray, once x-ray had arrived  As such, the patient was discharged without this x-ray, and the patient, as well as her family, were instructed to follow up with her primary care provider in the next 2-3 days for continued evaluation and treatment    They are instructed to return to the emergency department should symptoms greatly changed in character nature  The patient was stable at the time of discharge  Amount and/or Complexity of Data Reviewed  Decide to obtain previous medical records or to obtain history from someone other than the patient: yes  Obtain history from someone other than the patient: yes  Review and summarize past medical records: yes  Discuss the patient with other providers: yes  Independent visualization of images, tracings, or specimens: yes    Risk of Complications, Morbidity, and/or Mortality  Presenting problems: moderate  Diagnostic procedures: moderate  Management options: moderate    Patient Progress  Patient progress: stable    CritCare Time    Disposition  Final diagnoses:   Fall, initial encounter     Time reflects when diagnosis was documented in both MDM as applicable and the Disposition within this note     Time User Action Codes Description Comment    9/26/2018  9:33 PM Meredith Mendez [G20  XXXA] Fall, initial encounter       ED Disposition     ED Disposition Condition Comment    Discharge  [address] discharge to home/self care      Condition at discharge: Good        Follow-up Information     Follow up With Specialties Details Why Contact Info    Kandice Granger MD Family Medicine In 3 days If symptoms worsen, As needed [address] [place] [address]  [phone]            Discharge Medication List as of 9/26/2018  9:36 PM      CONTINUE these medications which have NOT CHANGED    Details   Ascorbic Acid (VITAMIN C PO) Take 1 tablet by mouth daily, Historical Med      carbidopa-levodopa (SINEMET CR)  mg TBCR per ER tablet Take 1tab tid and 2tabs qhs, Normal      carbidopa-levodopa (SINEMET)  mg per tablet TAKE ONE AND ONE-HALF TABLETS THREE TIMES A DAY, Normal      Cholecalciferol (VITAMIN D PO) Take 1 tablet by mouth daily, Historical Med      Cyanocobalamin (VITAMIN B 12 PO) Take 1 tablet by mouth daily, Historical Med      entacapone (COMTAN) 200 mg tablet Take 1 tablet (200 mg total) by mouth 3 (three) times a day, Starting Thu 8/16/2018, Normal      progesterone (PROMETRIUM) 200 MG capsule Take by mouth daily  , Until Discontinued, Historical Med      thyroid desiccated (ARMOUR) 30 mg tablet Take 30 mg by mouth daily, Until Discontinued, Historical Med      trospium (SANCTURA XR) 60 mg 24 hr capsule Take 1 capsule by mouth daily, Starting u 7/19/2018, Historical Med           No discharge procedures on file      ED Provider  Electronically Signed by           Tabby Zambrano PA-C  09/27/18 5358

## 2018-09-27 NOTE — ED NOTES
Family requesting to leave because "xray is taking too long "      Carolynn Anderson, RN  09/26/18 6389

## 2018-09-27 NOTE — DISCHARGE INSTRUCTIONS
Fall Prevention   WHAT YOU NEED TO KNOW:   Fall prevention includes ways to make your home and other areas safer  It also includes ways you can move more carefully to prevent a fall  Health conditions that cause changes in your blood pressure, vision, or muscle strength and coordination may increase your risk for falls  Medicines may also increase your risk for falls if they make you dizzy, weak, or sleepy  DISCHARGE INSTRUCTIONS:   Call 911 or have someone else call if:   · You have fallen and are unconscious  · You have fallen and cannot move part of your body  Contact your healthcare provider if:   · You have fallen and have pain or a headache  · You have questions or concerns about your condition or care  Fall prevention tips:   · Stand or sit up slowly  This may help you keep your balance and prevent falls  · Use assistive devices as directed  Your healthcare provider may suggest that you use a cane or walker to help you keep your balance  You may need to have grab bars put in your bathroom near the toilet or in the shower  · Wear shoes that fit well and have soles that   Wear shoes both inside and outside  Use slippers with good   Do not wear shoes with high heels  · Wear a personal alarm  This is a device that allows you to call 911 if you fall and need help  Ask your healthcare provider for more information  · Stay active  Exercise can help strengthen your muscles and improve your balance  Your healthcare provider may recommend water aerobics or walking  He or she may also recommend physical therapy to improve your coordination  Never start an exercise program without talking to your healthcare provider first      · Manage your medical conditions  Keep all appointments with your healthcare providers  Visit your eye doctor as directed  Home safety tips:   · Add items to prevent falls in the bathroom    Put nonslip strips on your bath or shower floor to prevent you from slipping  Use a bath mat if you do not have carpet in the bathroom  This will prevent you from falling when you step out of the bath or shower  Use a shower seat so you do not need to stand while you shower  Sit on the toilet or a chair in your bathroom to dry yourself and put on clothing  This will prevent you from losing your balance from drying or dressing yourself while you are standing  · Keep paths clear  Remove books, shoes, and other objects from walkways and stairs  Place cords for telephones and lamps out of the way so that you do not need to walk over them  Tape them down if you cannot move them  Remove small rugs  If you cannot remove a rug, secure it with double-sided tape  This will prevent you from tripping  · Install bright lights in your home  Use night lights to help light paths to the bathroom or kitchen  Always turn on the light before you start walking  · Keep items you use often on shelves within reach  Do not use a step stool to help you reach an item  · Paint or place reflective tape on the edges of your stairs  This will help you see the stairs better  Follow up with your healthcare provider as directed:  Write down your questions so you remember to ask them during your visits  © 2017 [employer] Information is for End User's use only and may not be sold, redistributed or otherwise used for commercial purposes  All illustrations and images included in CareNotes® are the copyrighted property of [employer]  or [employer]  The above information is an  only  It is not intended as medical advice for individual conditions or treatments  Talk to your doctor, nurse or pharmacist before following any medical regimen to see if it is safe and effective for you

## 2018-10-25 ENCOUNTER — HOSPITAL ENCOUNTER (EMERGENCY)
Facility: HOSPITAL | Age: 83
Discharge: HOME | End: 2018-10-25
Attending: EMERGENCY MEDICINE
Payer: MEDICARE

## 2018-10-25 ENCOUNTER — APPOINTMENT (EMERGENCY)
Dept: RADIOLOGY | Facility: HOSPITAL | Age: 83
End: 2018-10-25
Payer: MEDICARE

## 2018-10-25 VITALS
RESPIRATION RATE: 18 BRPM | HEART RATE: 68 BPM | DIASTOLIC BLOOD PRESSURE: 84 MMHG | BODY MASS INDEX: 22.08 KG/M2 | HEIGHT: 62 IN | SYSTOLIC BLOOD PRESSURE: 126 MMHG | OXYGEN SATURATION: 96 % | WEIGHT: 120 LBS | TEMPERATURE: 97.6 F

## 2018-10-25 DIAGNOSIS — R06.02 SHORTNESS OF BREATH: Primary | ICD-10-CM

## 2018-10-25 DIAGNOSIS — N39.0 UTI (URINARY TRACT INFECTION): ICD-10-CM

## 2018-10-25 LAB
ALBUMIN SERPL BCP-MCNC: 3.3 G/DL (ref 3.5–5)
ALP SERPL-CCNC: 139 U/L (ref 46–116)
ALT SERPL W P-5'-P-CCNC: 13 U/L (ref 12–78)
ANION GAP SERPL CALCULATED.3IONS-SCNC: 9 MMOL/L (ref 4–13)
AST SERPL W P-5'-P-CCNC: 29 U/L (ref 5–45)
BACTERIA UR QL AUTO: ABNORMAL /HPF
BASOPHILS # BLD AUTO: 0.04 THOUSANDS/ΜL (ref 0–0.1)
BASOPHILS NFR BLD AUTO: 0 % (ref 0–1)
BILIRUB SERPL-MCNC: 0.5 MG/DL (ref 0.2–1)
BILIRUB UR QL STRIP: NEGATIVE
BUN SERPL-MCNC: 26 MG/DL (ref 5–25)
CALCIUM SERPL-MCNC: 9 MG/DL (ref 8.3–10.1)
CHLORIDE SERPL-SCNC: 102 MMOL/L (ref 100–108)
CLARITY UR: ABNORMAL
CO2 SERPL-SCNC: 28 MMOL/L (ref 21–32)
COLOR UR: YELLOW
CREAT SERPL-MCNC: 1.04 MG/DL (ref 0.6–1.3)
DEPRECATED D DIMER PPP: 680 NG/ML (FEU) (ref 190–520)
EOSINOPHIL # BLD AUTO: 0.2 THOUSAND/ΜL (ref 0–0.61)
EOSINOPHIL NFR BLD AUTO: 2 % (ref 0–6)
ERYTHROCYTE [DISTWIDTH] IN BLOOD BY AUTOMATED COUNT: 13.6 % (ref 11.6–15.1)
GFR SERPL CREATININE-BSD FRML MDRD: 49 ML/MIN/1.73SQ M
GLUCOSE SERPL-MCNC: 91 MG/DL (ref 65–140)
GLUCOSE UR STRIP-MCNC: NEGATIVE MG/DL
HCT VFR BLD AUTO: 41.2 % (ref 34.8–46.1)
HGB BLD-MCNC: 13.6 G/DL (ref 11.5–15.4)
HGB UR QL STRIP.AUTO: NEGATIVE
IMM GRANULOCYTES # BLD AUTO: 0.02 THOUSAND/UL (ref 0–0.2)
IMM GRANULOCYTES NFR BLD AUTO: 0 % (ref 0–2)
KETONES UR STRIP-MCNC: ABNORMAL MG/DL
LEUKOCYTE ESTERASE UR QL STRIP: ABNORMAL
LYMPHOCYTES # BLD AUTO: 2.3 THOUSANDS/ΜL (ref 0.6–4.47)
LYMPHOCYTES NFR BLD AUTO: 24 % (ref 14–44)
MCH RBC QN AUTO: 30.5 PG (ref 26.8–34.3)
MCHC RBC AUTO-ENTMCNC: 33 G/DL (ref 31.4–37.4)
MCV RBC AUTO: 92 FL (ref 82–98)
MONOCYTES # BLD AUTO: 0.69 THOUSAND/ΜL (ref 0.17–1.22)
MONOCYTES NFR BLD AUTO: 7 % (ref 4–12)
NEUTROPHILS # BLD AUTO: 6.18 THOUSANDS/ΜL (ref 1.85–7.62)
NEUTS SEG NFR BLD AUTO: 67 % (ref 43–75)
NITRITE UR QL STRIP: POSITIVE
NON-SQ EPI CELLS URNS QL MICRO: ABNORMAL /HPF
NRBC BLD AUTO-RTO: 0 /100 WBCS
NT-PROBNP SERPL-MCNC: 444 PG/ML
PH UR STRIP.AUTO: 7.5 [PH] (ref 5–9)
PLATELET # BLD AUTO: 345 THOUSANDS/UL (ref 149–390)
PMV BLD AUTO: 11.1 FL (ref 8.9–12.7)
POTASSIUM SERPL-SCNC: 4.6 MMOL/L (ref 3.5–5.3)
PROT SERPL-MCNC: 7.6 G/DL (ref 6.4–8.2)
PROT UR STRIP-MCNC: ABNORMAL MG/DL
RBC # BLD AUTO: 4.46 MILLION/UL (ref 3.81–5.12)
RBC #/AREA URNS AUTO: ABNORMAL /HPF
SODIUM SERPL-SCNC: 139 MMOL/L (ref 136–145)
SP GR UR STRIP.AUTO: 1.01 (ref 1–1.03)
TROPONIN I SERPL-MCNC: <0.02 NG/ML
UROBILINOGEN UR QL STRIP.AUTO: 0.2 E.U./DL
WBC # BLD AUTO: 9.43 THOUSAND/UL (ref 4.31–10.16)
WBC #/AREA URNS AUTO: ABNORMAL /HPF

## 2018-10-25 PROCEDURE — 84484 ASSAY OF TROPONIN QUANT: CPT | Performed by: PHYSICIAN ASSISTANT

## 2018-10-25 PROCEDURE — 80053 COMPREHEN METABOLIC PANEL: CPT | Performed by: PHYSICIAN ASSISTANT

## 2018-10-25 PROCEDURE — 85025 COMPLETE CBC W/AUTO DIFF WBC: CPT | Performed by: PHYSICIAN ASSISTANT

## 2018-10-25 PROCEDURE — 85379 FIBRIN DEGRADATION QUANT: CPT | Performed by: PHYSICIAN ASSISTANT

## 2018-10-25 PROCEDURE — 93005 ELECTROCARDIOGRAM TRACING: CPT

## 2018-10-25 PROCEDURE — 96360 HYDRATION IV INFUSION INIT: CPT

## 2018-10-25 PROCEDURE — 83880 ASSAY OF NATRIURETIC PEPTIDE: CPT | Performed by: PHYSICIAN ASSISTANT

## 2018-10-25 PROCEDURE — 81001 URINALYSIS AUTO W/SCOPE: CPT | Performed by: PHYSICIAN ASSISTANT

## 2018-10-25 PROCEDURE — 99285 EMERGENCY DEPT VISIT HI MDM: CPT

## 2018-10-25 PROCEDURE — 71275 CT ANGIOGRAPHY CHEST: CPT

## 2018-10-25 PROCEDURE — 71046 X-RAY EXAM CHEST 2 VIEWS: CPT

## 2018-10-25 PROCEDURE — 36415 COLL VENOUS BLD VENIPUNCTURE: CPT | Performed by: PHYSICIAN ASSISTANT

## 2018-10-25 RX ORDER — CEPHALEXIN 250 MG/1
500 CAPSULE ORAL EVERY 12 HOURS SCHEDULED
Qty: 14 CAPSULE | Refills: 0 | Status: SHIPPED | OUTPATIENT
Start: 2018-10-25 | End: 2018-01-01

## 2018-10-25 RX ADMIN — IOHEXOL 85 ML: 350 INJECTION, SOLUTION INTRAVENOUS at 16:37

## 2018-10-25 RX ADMIN — SODIUM CHLORIDE 1000 ML: 0.9 INJECTION, SOLUTION INTRAVENOUS at 17:28

## 2018-10-25 NOTE — DISCHARGE INSTRUCTIONS
Shortness of Breath   WHAT YOU NEED TO KNOW:   Shortness of breath is a feeling that you cannot get enough air when you breathe in  You may have this feeling only during activity, or all the time  Your symptoms can range from mild to severe  Shortness of breath may be a sign of a serious health condition that needs immediate care  DISCHARGE INSTRUCTIONS:   Return to the emergency department if:   · Your signs and symptoms are the same or worse within 24 hours of treatment  · The skin over your ribs or on your neck sinks in when you breathe  · You feel confused or dizzy  Contact your healthcare provider if:   · You have new or worsening symptoms  · You have questions or concerns about your condition or care  Medicines:   · Medicines  may be used to treat the cause of your symptoms  You may need medicine to treat a bacterial infection or reduce anxiety  Other medicines may be used to open your airway, reduce swelling, or remove extra fluid  If you have a heart condition, you may need medicine to help your heart beat more strongly or regularly  · Take your medicine as directed  Contact your healthcare provider if you think your medicine is not helping or if you have side effects  Tell him or her if you are allergic to any medicine  Keep a list of the medicines, vitamins, and herbs you take  Include the amounts, and when and why you take them  Bring the list or the pill bottles to follow-up visits  Carry your medicine list with you in case of an emergency  Manage shortness of breath:   · Create an action plan  You and your healthcare provider can work together to create a plan for how to handle shortness of breath  The plan can include daily activities, treatment changes, and what to do if you have severe breathing problems  · Lean forward on your elbows when you sit  This helps your lungs expand and may make it easier to breathe  · Use pursed-lip breathing any time you feel short of breath  Breathe in through your nose and then slowly breathe out through your mouth with your lips slightly puckered  It should take you twice as long to breathe out as it did to breathe in  · Do not smoke  Nicotine and other chemicals in cigarettes and cigars can cause lung damage and make shortness of breath worse  Ask your healthcare provider for information if you currently smoke and need help to quit  E-cigarettes or smokeless tobacco still contain nicotine  Talk to your healthcare provider before you use these products  · Reach or maintain a healthy weight  Your healthcare provider can help you create a safe weight loss plan if you are overweight  · Exercise as directed  Exercise can help your lungs work more easily  Exercise can also help you lose weight if needed  Try to get at least 30 minutes of exercise most days of the week  Follow up with your healthcare provider or specialist as directed:  Write down your questions so you remember to ask them during your visits  © 2017 [employer] Information is for End User's use only and may not be sold, redistributed or otherwise used for commercial purposes  All illustrations and images included in CareNotes® are the copyrighted property of [employer]  or Adam Nieves  The above information is an  only  It is not intended as medical advice for individual conditions or treatments  Talk to your doctor, nurse or pharmacist before following any medical regimen to see if it is safe and effective for you

## 2018-10-25 NOTE — ED PROVIDER NOTES
History  Chief Complaint   Patient presents with    Shortness of Breath     Patient here with abrupt onset of dyspnea since this morning  Occurred shortly after taking medication  Pt  states "feels like water in my chest"  80year old female with history of parkinsons disease presents for shortness of breath x1 hour  She states that today while she was home taking her medication she felt short of breath  She was unable to eat her lunch from her shortness of breath  Nothing makes her shortness of breath better or worse  Upon arrival to the ED she states it has improved  She denies chest pain, nausea, vomiting, diarrhea, constipation, urinary complaints, back pain, headache, sore throat, cough, leg swelling  Prior to Admission Medications   Prescriptions Last Dose Informant Patient Reported? Taking? Ascorbic Acid (VITAMIN C PO)   Yes No   Sig: Take 1 tablet by mouth daily   Cholecalciferol (VITAMIN D PO) 10/25/2018 at Unknown time  Yes Yes   Sig: Take 1 tablet by mouth daily   Cyanocobalamin (VITAMIN B 12 PO) 10/25/2018 at Unknown time  Yes Yes   Sig: Take 1 tablet by mouth daily   carbidopa-levodopa (SINEMET CR)  mg TBCR per ER tablet 10/25/2018 at Unknown time  No Yes   Sig: Take 1tab tid and 2tabs qhs   Patient taking differently: Take 1tab tid    progesterone (PROMETRIUM) 200 MG capsule 10/25/2018 at Unknown time  Yes Yes   Sig: Take by mouth daily     thyroid desiccated (ARMOUR) 30 mg tablet 10/25/2018 at Unknown time  Yes Yes   Sig: Take 30 mg by mouth daily      Facility-Administered Medications: None       Past Medical History:   Diagnosis Date    Hypothyroidism     Parkinson's disease ([facility] [address] )        History reviewed  No pertinent surgical history  Family History   Problem Relation Age of Onset    Hypertension Mother     Lung cancer Father      I have reviewed and agree with the history as documented      Social History   Substance Use Topics    Smoking status: Never Smoker    Smokeless tobacco: Never Used    Alcohol use No        Review of Systems   Constitutional: Negative for chills and fever  HENT: Negative for sneezing and sore throat  Eyes: Negative for visual disturbance  Respiratory: Positive for shortness of breath  Negative for cough and wheezing  Cardiovascular: Negative for chest pain, palpitations and leg swelling  Gastrointestinal: Negative for abdominal pain, constipation, diarrhea, nausea and vomiting  Musculoskeletal: Negative for back pain, gait problem and joint swelling  Skin: Negative for color change, pallor, rash and wound  Neurological: Negative for dizziness, weakness, light-headedness, numbness and headaches  Psychiatric/Behavioral: Negative for agitation  All other systems reviewed and are negative  Physical Exam  Physical Exam   Constitutional:   Elderly appearing thin female in no acute respiratory distress   HENT:   Head: Normocephalic and atraumatic  Nose: Nose normal    Eyes: EOM are normal    Neck: Normal range of motion  Cardiovascular: Normal rate, regular rhythm, normal heart sounds and intact distal pulses  Exam reveals no gallop and no friction rub  No murmur heard  Pulmonary/Chest: Effort normal and breath sounds normal  No respiratory distress  She has no wheezes  She has no rales  Sp02 is 96% indicating adequate oxygenation on room air   Abdominal: Soft  Bowel sounds are normal  She exhibits no distension and no mass  There is no tenderness  There is no guarding  Musculoskeletal: Normal range of motion  She exhibits no edema  No leg swelling  Negative candace sign bilaterally  No palpable cords or erythema   Skin: Skin is warm and dry  No rash noted  No erythema  No pallor  Nursing note and vitals reviewed        Vital Signs  ED Triage Vitals   Temperature Pulse Respirations Blood Pressure SpO2   10/25/18 1408 10/25/18 1408 10/25/18 1408 10/25/18 1408 10/25/18 1408   97 6 °F (36 4 °C) 77 18 (!) 174/78 96 % Temp Source Heart Rate Source Patient Position - Orthostatic VS BP Location FiO2 (%)   10/25/18 1408 10/25/18 1408 10/25/18 1408 10/25/18 1705 --   Oral Monitor Lying Right arm       Pain Score       --                  Vitals:    10/25/18 1408 10/25/18 1705   BP: (!) 174/78 138/76   Pulse: 77 74   Patient Position - Orthostatic VS: Lying        Visual Acuity      ED Medications  Medications   sodium chloride 0 9 % bolus 1,000 mL (1,000 mL Intravenous New Bag 10/25/18 1728)   iohexol (OMNIPAQUE) 350 MG/ML injection (SINGLE-DOSE) 85 mL (85 mL Intravenous Given 10/25/18 1637)       Diagnostic Studies  Results Reviewed     Procedure Component Value Units Date/Time    Urine Microscopic [[id number]]  (Abnormal) Collected:  10/25/18 1758    Lab Status:  Final result Specimen:  Urine from Urine, Other Updated:  10/25/18 1812     RBC, UA None Seen /hpf      WBC, UA 20-30 (A) /hpf      Epithelial Cells Moderate (A) /hpf      Bacteria, UA Innumerable (A) /hpf     UA (URINE) with reflex to Microscopic [[id number]]  (Abnormal) Collected:  10/25/18 1758    Lab Status:  Final result Specimen:  Urine from Urine, Other Updated:  10/25/18 1805     Color, UA Yellow     Clarity, UA Slightly Cloudy     Specific Gravity, UA 1 015     pH, UA 7 5     Leukocytes, UA Moderate (A)     Nitrite, UA Positive (A)     Protein, UA Trace (A) mg/dl      Glucose, UA Negative mg/dl      Ketones, UA Trace (A) mg/dl      Urobilinogen, UA 0 2 E U /dl      Bilirubin, UA Negative     Blood, UA Negative    D-dimer, quantitative [[id number]]  (Abnormal) Collected:  10/25/18 1514    Lab Status:  Final result Specimen:  Blood from Arm, Right Updated:  10/25/18 1536     D-Dimer, Quant 680 (H) ng/ml (FEU)     Comprehensive metabolic panel [[id number]]  (Abnormal) Collected:  10/25/18 1430    Lab Status:  Final result Specimen:  Blood from Arm, Right Updated:  10/25/18 1506     Sodium 139 mmol/L      Potassium 4 6 mmol/L      Chloride 102 mmol/L      CO2 28 mmol/L ANION GAP 9 mmol/L      BUN 26 (H) mg/dL      Creatinine 1 04 mg/dL      Glucose 91 mg/dL      Calcium 9 0 mg/dL      AST 29 U/L      ALT 13 U/L      Alkaline Phosphatase 139 (H) U/L      Total Protein 7 6 g/dL      Albumin 3 3 (L) g/dL      Total Bilirubin 0 50 mg/dL      eGFR 49 ml/min/1 73sq m     Narrative:         National Kidney Disease Education Program recommendations are as follows:  GFR calculation is accurate only with a steady state creatinine  Chronic Kidney disease less than 60 ml/min/1 73 sq  meters  Kidney failure less than 15 ml/min/1 73 sq  meters  BNP [[id number]]  (Normal) Collected:  10/25/18 1430    Lab Status:  Final result Specimen:  Blood from Arm, Right Updated:  10/25/18 1506     NT-proBNP 444 pg/mL     Troponin I [[id number]]  (Normal) Collected:  10/25/18 1430    Lab Status:  Final result Specimen:  Blood from Arm, Right Updated:  10/25/18 1501     Troponin I <0 02 ng/mL     CBC and differential [[id number]] Collected:  10/25/18 1430    Lab Status:  Final result Specimen:  Blood from Arm, Right Updated:  10/25/18 1442     WBC 9 43 Thousand/uL      RBC 4 46 Million/uL      Hemoglobin 13 6 g/dL      Hematocrit 41 2 %      MCV 92 fL      MCH 30 5 pg      MCHC 33 0 g/dL      RDW 13 6 %      MPV 11 1 fL      Platelets 003 Thousands/uL      nRBC 0 /100 WBCs      Neutrophils Relative 67 %      Immat GRANS % 0 %      Lymphocytes Relative 24 %      Monocytes Relative 7 %      Eosinophils Relative 2 %      Basophils Relative 0 %      Neutrophils Absolute 6 18 Thousands/µL      Immature Grans Absolute 0 02 Thousand/uL      Lymphocytes Absolute 2 30 Thousands/µL      Monocytes Absolute 0 69 Thousand/µL      Eosinophils Absolute 0 20 Thousand/µL      Basophils Absolute 0 04 Thousands/µL                  CTA ED chest PE study   Final Result by Lazarus Robinson, MD (10/25 1659)      No acute findings specifically, no pulmonary arterial embolism or pulmonary consolidation        Moderate sliding-type hiatal hernia  Workstation performed: [id number]         XR chest 2 views   Final Result by Flores Lyons MD (10/25 8142)      No acute cardiopulmonary disease  Workstation performed: [id number]                    Procedures  Procedures       Phone Contacts  ED Phone Contact    ED Course  ED Course as of Oct 25 1838   Thu Oct 25, 2018   1513 Re-evaluated patient  She no longer feels short of breath  Labs unremarkable at this time, will check d-dimer    1705 No PE  Will hydrate and re-evaluate  1745 Patient feeling much better  Ready for discharge  MDM  Number of Diagnoses or Management Options  Shortness of breath:   UTI (urinary tract infection):   Diagnosis management comments: Episodic dyspnea resolved since stay in ED  Slightly elevated d-dimer with CTA chest no PE, other labs unremarkable  cxr no acute cardiopulm disease  UA positive leuk + nitrites, will tx with keflex  Recommend follow up with primary care physician if symptoms persist  Gave patient proper education regarding diagnosis  Answered all questions  Return to ED for any worsening of symptoms otherwise follow up with primary care physician for re-evaluation  Discussed plan with patient who verbalized understanding and agreed to plan         Amount and/or Complexity of Data Reviewed  Clinical lab tests: ordered and reviewed  Tests in the radiology section of CPT®: ordered and reviewed  Tests in the medicine section of CPT®: reviewed and ordered  Discussion of test results with the performing providers: yes  Discuss the patient with other providers: yes (Discussed case with Dr Carny Ulloa)  Independent visualization of images, tracings, or specimens: yes      CritCare Time    Disposition  Final diagnoses:   Shortness of breath   UTI (urinary tract infection)     Time reflects when diagnosis was documented in both MDM as applicable and the Disposition within this note     Time User Action Codes Description Comment    10/25/2018  5:51 PM Betito Crawfordjim Add [R06 02] Shortness of breath     10/25/2018  6:08 PM Betito Tyjim Add [N39 0] UTI (urinary tract infection)       ED Disposition     ED Disposition Condition Comment    Discharge  [address] discharge to home/self care  Condition at discharge: Good        Follow-up Information     Follow up With Specialties Details Why Contact Info Additional Information    Levi Grover MD Family Medicine Schedule an appointment as soon as possible for a visit in 3 days If symptoms worsen [address] Emergency Department Emergency Medicine Go to As needed [address] [facility], [place], [place], [address]          Patient's Medications   Discharge Prescriptions    CEPHALEXIN (KEFLEX) 250 MG CAPSULE    Take 2 capsules (500 mg total) by mouth every 12 (twelve) hours for 7 days       Start Date: 10/25/2018End Date: 11/1/2018       Order Dose: 500 mg       Quantity: 14 capsule    Refills: 0     No discharge procedures on file      ED Provider  Electronically Signed by           Noe Bolanos PA-C  10/25/18 0834

## 2018-10-26 LAB
ATRIAL RATE: 72 BPM
PR INTERVAL: 166 MS
QRS AXIS: -26 DEGREES
QRSD INTERVAL: 68 MS
QT INTERVAL: 414 MS
QTC INTERVAL: 453 MS
T WAVE AXIS: 40 DEGREES
VENTRICULAR RATE: 72 BPM

## 2018-10-26 PROCEDURE — 93010 ELECTROCARDIOGRAM REPORT: CPT | Performed by: INTERNAL MEDICINE

## 2018-11-19 PROBLEM — N30.00 ACUTE CYSTITIS: Status: ACTIVE | Noted: 2018-01-01

## 2018-11-19 PROBLEM — R93.5 ABNORMAL CT OF THE ABDOMEN: Status: ACTIVE | Noted: 2018-01-01

## 2018-11-19 PROBLEM — R26.9 GAIT ABNORMALITY: Status: ACTIVE | Noted: 2018-01-01

## 2018-11-19 NOTE — ED PROVIDER NOTES
History  Chief Complaint   Patient presents with    Weakness - Generalized     generalized weakness started yesterday, whenn walking up to bathroom pt feels that her legs gives up  increase with back pain, new onset with R groin pain     15-year-old female presents with bilateral lower extremity weakness and lower back pain when she tried to do her physical therapy today  Pain is only present when she stands  Patient has a history of Parkinson's  She denies any dysuria urgency frequency no fevers vomiting abdominal pain, or any other symptoms  Patient recently had a fall was diagnosed with urinary tract infection and put on Keflex  She has about finishing up her antibiotics  Denies any headache no head injury or any other symptoms  No urinary symptoms of incontinence bowel incontinence urinary retention saddle anesthesia or any other symptoms  History provided by:  Patient   used: No        Prior to Admission Medications   Prescriptions Last Dose Informant Patient Reported? Taking?    Ascorbic Acid (VITAMIN C PO) 11/19/2018 at Unknown time  Yes Yes   Sig: Take 1 tablet by mouth daily   Cholecalciferol (VITAMIN D PO) 11/19/2018 at Unknown time  Yes Yes   Sig: Take 1 tablet by mouth daily   Coenzyme Q10 (COQ10 PO)   Yes Yes   Sig: Take by mouth   Cyanocobalamin (VITAMIN B 12 PO) 11/19/2018 at Unknown time  Yes Yes   Sig: Take 1 tablet by mouth daily   carbidopa-levodopa (SINEMET CR)  mg TBCR per ER tablet 11/19/2018 at Unknown time  No Yes   Sig: Take 1tab tid and 2tabs qhs   Patient taking differently: 1 tablet 4 (four) times a day     carbidopa-levodopa (SINEMET)  mg per tablet 11/19/2018 at Unknown time  Yes Yes   Sig: Take 1 5 tablets by mouth 3 (three) times a day [id number]   progesterone (PROMETRIUM) 200 MG capsule 11/18/2018 at Unknown time  Yes Yes   Sig: Take 200 mg by mouth daily at bedtime     thyroid desiccated (ARMOUR) 30 mg tablet 11/19/2018 at Unknown time  Yes Yes   Sig: Take 30 mg by mouth daily      Facility-Administered Medications: None       Past Medical History:   Diagnosis Date    Arthritis     Hypothyroidism     Parkinson's disease ([facility] [address] )     Thyroid disease        History reviewed  No pertinent surgical history  Family History   Problem Relation Age of Onset    Hypertension Mother     Lung cancer Father      I have reviewed and agree with the history as documented  Social History   Substance Use Topics    Smoking status: Never Smoker    Smokeless tobacco: Never Used    Alcohol use No        Review of Systems   All other systems reviewed and are negative  Physical Exam  Physical Exam   Constitutional: She is oriented to person, place, and time  She appears well-developed and well-nourished  HENT:   Head: Normocephalic and atraumatic  Eyes: Pupils are equal, round, and reactive to light  EOM are normal    Neck: Normal range of motion  Neck supple  Cardiovascular: Normal rate and regular rhythm  Pulmonary/Chest: Effort normal and breath sounds normal    Abdominal: Soft  Bowel sounds are normal    Musculoskeletal: Normal range of motion  She exhibits no edema, tenderness or deformity  Neurological: She is alert and oriented to person, place, and time  Skin: Skin is warm and dry  Psychiatric: She has a normal mood and affect  Nursing note and vitals reviewed        Vital Signs  ED Triage Vitals [11/19/18 1606]   Temperature Pulse Respirations Blood Pressure SpO2   98 4 °F (36 9 °C) 63 19 (!) 213/83 96 %      Temp Source Heart Rate Source Patient Position - Orthostatic VS BP Location FiO2 (%)   Oral Monitor Lying Left arm --      Pain Score       No Pain           Vitals:    11/23/18 1325 11/23/18 1339 11/23/18 1414 11/23/18 1617   BP: (!) 194/90 160/78 150/80 136/70   Pulse: 63 72 69 62   Patient Position - Orthostatic VS:   Lying Lying       Visual Acuity  Visual Acuity      Most Recent Value   L Pupil Size (mm)  3   R Pupil Size (mm)  3   L Pupil Shape  Round   R Pupil Shape  Round          ED Medications  Medications   insulin glargine (LANTUS) 100 units/mL subcutaneous injection **ADS Override Pull** (  Not Given 11/20/18 0143)   dextrose 5 % infusion (0 mL/hr Intravenous Stopped 11/22/18 1223)   sodium chloride 0 9 % bolus 500 mL (0 mL Intravenous Stopped 11/19/18 2008)   cefTRIAXone (ROCEPHIN) IVPB (premix) 1,000 mg (1,000 mg Intravenous New Bag 11/19/18 2015)   iohexol (OMNIPAQUE) 350 MG/ML injection (MULTI-DOSE) 100 mL (100 mL Intravenous Given 11/21/18 1415)       Diagnostic Studies  Results Reviewed     Procedure Component Value Units Date/Time    Urine culture [[id number]]  (Abnormal)  (Susceptibility) Collected:  11/19/18 1748    Lab Status:  Final result Specimen:  Urine from Urine, Clean Catch Updated:  11/21/18 1844     Urine Culture >100,000 cfu/ml Escherichia coli (A)    Susceptibility      Escherichia coli     VEGA    Ampicillin ($$) <8 00 ug/ml Susceptible    Aztreonam ($$$)  <4 ug/ml Susceptible    Cefazolin ($) <2 00 ug/ml Susceptible    Ciprofloxacin ($)  <1 00 ug/ml Susceptible    Gentamicin ($$) <1 ug/ml Susceptible    Levofloxacin ($) <0 25 ug/ml Susceptible    Nitrofurantoin <32 ug/ml Susceptible    Tetracycline <4 ug/ml Susceptible    Tobramycin ($) 2 ug/ml Susceptible    Trimethoprim + Sulfamethoxazole ($$$) <2/38 ug/ml Susceptible    ZID Performed Yes                     Urine Microscopic [[id number]]  (Abnormal) Collected:  11/19/18 1748    Lab Status:  Final result Specimen:  Urine from Urine, Clean Catch Updated:  11/19/18 1802     RBC, UA None Seen /hpf      WBC, UA Innumerable (A) /hpf      Epithelial Cells Moderate (A) /hpf      Bacteria, UA Innumerable (A) /hpf     Basic metabolic panel [[id number]]  (Abnormal) Collected:  11/19/18 1655    Lab Status:  Final result Specimen:  Blood from Arm, Left Updated:  11/19/18 1757     Sodium 127 (L) mmol/L      Potassium 3 8 mmol/L      Chloride 95 (L) mmol/L CO2 28 mmol/L      ANION GAP 4 mmol/L      BUN 22 mg/dL      Creatinine 1 24 mg/dL      Glucose 94 mg/dL      Calcium 9 6 mg/dL      eGFR 40 ml/min/1 73sq m     Narrative:         National Kidney Disease Education Program recommendations are as follows:  GFR calculation is accurate only with a steady state creatinine  Chronic Kidney disease less than 60 ml/min/1 73 sq  meters  Kidney failure less than 15 ml/min/1 73 sq  meters      Hepatic function panel [[id number]]  (Abnormal) Collected:  11/19/18 1655    Lab Status:  Final result Specimen:  Blood from Arm, Left Updated:  11/19/18 1757     Total Bilirubin 0 80 mg/dL      Bilirubin, Direct 0 30 (H) mg/dL      Alkaline Phosphatase 285 (H) U/L      AST 64 (H) U/L      ALT 15 U/L      Total Protein 7 4 g/dL      Albumin 2 9 (L) g/dL     Magnesium [[id number]]  (Abnormal) Collected:  11/19/18 1655    Lab Status:  Final result Specimen:  Blood from Arm, Left Updated:  11/19/18 1757     Magnesium 2 7 (H) mg/dL     CK (with reflex to MB) [[id number]]  (Normal) Collected:  11/19/18 1655    Lab Status:  Final result Specimen:  Blood from Arm, Left Updated:  11/19/18 1756     Total CK 37 U/L     UA w Reflex to Microscopic [[id number]]  (Abnormal) Collected:  11/19/18 1748    Lab Status:  Final result Specimen:  Urine from Urine, Clean Catch Updated:  11/19/18 1755     Color, UA Yellow     Clarity, UA Cloudy     Specific [place], UA 1 020     pH, UA 7 5     Leukocytes, UA Large (A)     Nitrite, UA Positive (A)     Protein, UA 30 (1+) (A) mg/dl      Glucose, UA Negative mg/dl      Ketones, UA Trace (A) mg/dl      Urobilinogen, UA 0 2 E U /dl      Bilirubin, UA Negative     Blood, UA Trace-Intact (A)    CBC and differential [[id number]] Collected:  11/19/18 1655    Lab Status:  Final result Specimen:  Blood from Arm, Left Updated:  11/19/18 1733     WBC 7 59 Thousand/uL      RBC 4 13 Million/uL      Hemoglobin 12 6 g/dL      Hematocrit 38 6 %      MCV 94 fL      MCH 30 5 pg MCHC 32 6 g/dL      RDW 14 1 %      MPV 11 7 fL      Platelets 768 Thousands/uL      nRBC 0 /100 WBCs      Neutrophils Relative 68 %      Immat GRANS % 0 %      Lymphocytes Relative 21 %      Monocytes Relative 10 %      Eosinophils Relative 1 %      Basophils Relative 0 %      Neutrophils Absolute 5 11 Thousands/µL      Immature Grans Absolute 0 02 Thousand/uL      Lymphocytes Absolute 1 62 Thousands/µL      Monocytes Absolute 0 76 Thousand/µL      Eosinophils Absolute 0 05 Thousand/µL      Basophils Absolute 0 03 Thousands/µL                  XR retrograde pyelogram   Final Result by Reema Brady MD (11/23 1321)      Fluoroscopic guidance provided for left retrograde pyelogram  Please see procedure report for further details  Workstation performed: [id number]         CT renal protocol   Final Result by Martin Blackwood MD (11/21 3679)         1  Moderate left hydronephrosis and proximal ureterectasis with no obstructive calculi and/or ureteral mass  Enhancement of the renal pelvis and ureter concerning for pyelitis  2   Patchy bilateral cortical enhancement concerning for pyelonephritis  No perinephric collections  3   Myomatous uterus  4   Trace pleural effusions with bibasilar atelectasis   5  Hiatal hernia  The study was marked in [facility] for immediate notification  Workstation performed: [id number]         US kidney and bladder   Final Result by Reema Brady MD (11/20 3427)      Normal right kidney  Moderate left hydronephrosis  Workstation performed: [id number]         CT renal stone study abdomen pelvis wo contrast   Final Result by Sujit Montemayor MD (11/19 1926)      1  Probable moderate left hydronephrosis versus multiple parapelvic cysts  Renal ultrasound is recommended for further evaluation  2   Fibroid uterus         I personally discussed this study with Twila Brock on 11/19/2018 at 7:25 PM                      Workstation performed: [id number] CT head without contrast   Final Result by John Cantrell MD (11/19 1900)      No acute intracranial abnormality  Stable examination  Workstation performed: [id number]         XR spine lumbar 2 or 3 views injury   Final Result by Trang Mcguire DO (11/19 7807)      Somewhat limited study  No acute osseous abnormality  Mild dextroscoliosis with moderately advanced multilevel degenerative changes  Workstation performed: [id number]                    Procedures  ECG 12 Lead Documentation  Performed by: Juancho Houser by: Chidi Bull     ECG reviewed by me, the ED Provider: yes    Patient location:  ED  Previous ECG:     Previous ECG:  Unavailable    Comparison to cardiac monitor: Yes    Interpretation:     Interpretation: non-specific    Rate:     ECG rate assessment: normal    Rhythm:     Rhythm: sinus rhythm    Ectopy:     Ectopy: none    QRS:     QRS axis:  Normal  Conduction:     Conduction: normal    ST segments:     ST segments:  Non-specific  T waves:     T waves: non-specific             Phone Contacts  ED Phone Contact    ED Course                               MDM  Number of Diagnoses or Management Options  Hyponatremia:   UTI (urinary tract infection):   Weakness:   Diagnosis management comments: Patient evaluated with labs, imaging, UA  Reviewed results discussed with patient  Patient given IV fluids based on calculation for sodium correction for the hyponatremia  Also given antibiotics  Patient admitted to hospitalist service for further evaluation and management  Patient verbalized understanding of results and agreed with the plan         Amount and/or Complexity of Data Reviewed  Clinical lab tests: ordered and reviewed  Tests in the radiology section of CPT®: ordered and reviewed  Tests in the medicine section of CPT®: ordered and reviewed    Patient Progress  Patient progress: stable    CritCare Time    Disposition  Final diagnoses:   Weakness Hyponatremia   UTI (urinary tract infection)     Time reflects when diagnosis was documented in both MDM as applicable and the Disposition within this note     Time User Action Codes Description Comment    11/19/2018  7:53 PM Anepu, Anna Add [R53 1] Weakness     11/19/2018  7:53 PM Anepu, Anna Add [E87 1] Hyponatremia     11/19/2018  7:53 PM Anepu, Laila Public Add [N39 0] UTI (urinary tract infection)     11/20/2018  7:49 PM Cam Pay Add [N13 30] Hydronephrosis     11/23/2018  5:40 PM Cam Pay Add [J31 0] Other rhinitis     11/23/2018  5:43 PM Tori Caras, [address] Add [R74 8] Elevated alkaline phosphatase level       ED Disposition     ED Disposition Condition Comment    Admit         Follow-up Information     Follow up With Specialties Details Why [address] Internal Medicine Follow up Please call her office to make a follow up without fail  Jose Ram [address]      Doron Delgado MD Urology Follow up Please call his office for you to be seen in 3 weeks as he directed for you to do so  [address]  [phone]            Discharge Medication List as of 11/23/2018  5:34 PM      START taking these medications    Details   cefadroxil (DURICEF) 1 g tablet Take 1 tablet (1 g total) by mouth 2 (two) times a day for 5 days, Starting Sat 11/24/2018, Until Thu 11/29/2018, Print         CONTINUE these medications which have CHANGED    Details   phenazopyridine (PYRIDIUM) 100 mg tablet Please take as needed only and will turn urine orange  , Print         CONTINUE these medications which have NOT CHANGED    Details   Ascorbic Acid (VITAMIN C PO) Take 1 tablet by mouth daily, Historical Med      carbidopa-levodopa (SINEMET CR)  mg TBCR per ER tablet Take 1tab tid and 2tabs qhs, Normal      carbidopa-levodopa (SINEMET)  mg per tablet Take 1 5 tablets by mouth 3 (three) times a day [id number], Historical Med Cholecalciferol (VITAMIN D PO) Take 1 tablet by mouth daily, Historical Med      Coenzyme Q10 (COQ10 PO) Take by mouth, Historical Med      Cyanocobalamin (VITAMIN B 12 PO) Take 1 tablet by mouth daily, Historical Med      progesterone (PROMETRIUM) 200 MG capsule Take 200 mg by mouth daily at bedtime  , Historical Med      thyroid desiccated (ARMOUR) 30 mg tablet Take 30 mg by mouth daily, Until Discontinued, Historical Med             Outpatient Discharge Orders  Comprehensive metabolic panel (CMP)   Standing Status: Future  Standing Exp   Date: 11/23/19     Discharge Diet     Call provider for:  persistent nausea or vomiting     Call provider for:  severe uncontrolled pain     Call provider for: active or persistent bleeding     Call provider for:  difficulty breathing, headache or visual disturbances     Call provider for:  hives     Call provider for:  persistent dizziness or light-headedness     Call provider for:  extreme fatigue         ED Provider  Electronically Signed by           Saad Bhagat DO  11/25/18 1017

## 2018-11-19 NOTE — ED NOTES
Pt  states that pt needs to take her sinemet dose at this time otherwise pt feels sick  Dr Griselda Pugh is notified        Maria Antonia Andrea RN  11/19/18 8359

## 2018-11-19 NOTE — TELEPHONE ENCOUNTER
Given acute onset of worsening symptoms, agree with need to be seen in ER for eval  Could be underlying infection or something more acute

## 2018-11-19 NOTE — ED NOTES
Dr Tracy Stafford is made aware that pt refused the pain meds at this time  Pt states that she doesn't have any pain at this time  Pain only when pt ambulates        Kati Grimm RN  11/19/18 2289

## 2018-11-19 NOTE — TELEPHONE ENCOUNTER
Pt's  Kamini Krishnamurthy called to report that wife unable to walk  No strength,"her  legs move but she's too weak to walk " Sx started last night and worse today  Questioning if related to PD  I advised hin to take her to ED for eval however they want to be sure Dr Sherine Putnam does not have any other recommendation   They were unable to confirm all meds but did state pt taking:     Sinemet 25-100mg 1 5 tabs TID   Sinemet CR 25-100mg 1 tab QID     [facility] [phone] or [phone]

## 2018-11-20 NOTE — PLAN OF CARE

## 2018-11-20 NOTE — ASSESSMENT & PLAN NOTE
· Patient has difficulty ambulating at baseline and uses a walker  · Patient was unable to ambulate at all today  · Physical therapy evaluation

## 2018-11-20 NOTE — ASSESSMENT & PLAN NOTE
· CT of the abdomen and pelvis revealed left hydronephrosis versus multiple parapelvic cysts  · Will get a renal ultrasound

## 2018-11-20 NOTE — ASSESSMENT & PLAN NOTE
Patient was seen in the ER couple weeks ago and diagnosed with urinary tract infection  Ceftriaxone started in the ED   S/P Keflex x7 days  · Urine today consistent with continued UTI  · Urine culture pending  · Continue Ceftriaxone day 2

## 2018-11-20 NOTE — ASSESSMENT & PLAN NOTE
· Sodium 127 on admission with a history of hyponatremia on previous admissions  · Check serum osmolality, urine lytes, urine osmolality  · Will repeat Na at midnight as patient received a 500cc bolus in ER

## 2018-11-20 NOTE — ASSESSMENT & PLAN NOTE
Patient has difficulty ambulating at baseline and uses a walker    Presented with inability to ambulate and sodium level found to be 127   · PT/OT eval  · Fall precautions  · Trend NA level

## 2018-11-20 NOTE — CONSULTS
Inpatient Consultation - Nephrology   Sudhakar Joseph 80 y o  female MRN: [id number]  Unit/Bed#: [address] [place] [address] Encounter: [id number]    ASSESSMENT and PLAN:    80 y o  female with PMhx of parkinson's, hypothyroid, who was admitted to [facility] after presenting with weakness on 11/19  A renal consultation is requested today for assistance in the management of hyponatremia  1) hyponatremia - likely prerenal etiology given rapid improvement with NS    - initially sodium was 127 and rapid correction to 138 with  cc  - changed to D5W overnight which is held and Na this AM is 133   - so, a change of 6 meq over 12 hours  - recheck Na at 11 am pending  - goal Na today 133-135      2) renal function - relatively stable this year, though Cr is higher than last year  Unclear if this is new baseline    - CT imaging with hydro vs cysts  - await renal u/s    3) HTN    - if BP remains above 160 syst, can start amlodipine low dose 2 5 mg and titrate    4) pyuria -     - antibiotics per Primary Team  - f/u final u/s   - f/u final cultures    5) ambulatory dysfunction    - eval in progress per Primary Team    6) elevated alk phos    - as per Primary Team  - could check GGT to start    HISTORY OF PRESENT ILLNESS:  Requesting Physician: Ceasar Rubinstein, MD  Reason for Consult: hyponatremia    Sudhakar Joseph is a 80 y o  female with PMhx of parkinson's, hypothyroid, who was admitted to [facility] after presenting with weakness on 11/19  A renal consultation is requested today for assistance in the management of hyponatremia  Patient presented on 11/19 with weakness of lower extremities  Patient was recently treated for UTI and there was concern for UTI  Pt states main complaint currently is that could not ambulate for 2 days and that is why is here at hospital  Denies dysuria, abd pain, SOB, fevers       PAST MEDICAL HISTORY:  Past Medical History:   Diagnosis Date    Arthritis     Hypothyroidism     Parkinson's disease ([facility] [address] )     Thyroid disease        PAST SURGICAL HISTORY:  History reviewed  No pertinent surgical history  ALLERGIES:  No Known Allergies    SOCIAL HISTORY:  History   Alcohol Use No     History   Drug Use No     History   Smoking Status    Never Smoker   Smokeless Tobacco    Never Used       FAMILY HISTORY:  Family History   Problem Relation Age of Onset    Hypertension Mother     Lung cancer Father        MEDICATIONS:    Current Facility-Administered Medications:     acetaminophen (TYLENOL) tablet 650 mg, 650 mg, Oral, Once, Anna Yobanyu, DO    ascorbic acid (VITAMIN C) tablet 500 mg, 500 mg, Oral, Daily, MAZIN Lizama    carbidopa-levodopa (SINEMET CR)  mg per ER tablet 1 tablet, 1 tablet, Oral, 4x Daily, MAZIN Lizama, 1 tablet at 11/19/18 2133    carbidopa-levodopa (SINEMET)  mg per tablet 1 5 tablet, 1 5 tablet, Oral, TID, MAZIN Lizama    cefTRIAXone (ROCEPHIN) IVPB (premix) 1,000 mg, 1,000 mg, Intravenous, Q24H, Yanna Gandhi MD    enoxaparin (LOVENOX) subcutaneous injection 30 mg, 30 mg, Subcutaneous, Daily, MAZIN Lizama    insulin glargine (LANTUS) 100 units/mL subcutaneous injection **ADS Override Pull**, , , ,     levothyroxine tablet 50 mcg, 50 mcg, Oral, Early Morning, MAZIN Lizama, 50 mcg at 11/20/18 0546    REVIEW OF SYSTEMS:    All the systems were reviewed and were negative except as documented on the HPI      PHYSICAL EXAM:  Current Weight: Weight - Scale: 49 9 kg (110 lb 0 2 oz)  First Weight: Weight - Scale: 53 1 kg (117 lb)  Vitals:    11/19/18 2100 11/20/18 0021 11/20/18 0354 11/20/18 0700   BP: 154/68 159/62 158/69 (!) 171/85   BP Location: Right arm  Right arm Right arm   Pulse: 61 73 76 58   Resp: 21 20 18 16   Temp: (!) 97 4 °F (36 3 °C) 98 °F (36 7 °C) 98 °F (36 7 °C) 98 5 °F (36 9 °C)   TempSrc:  Oral Tympanic Tympanic   SpO2: 93% 93% 94% 94%   Weight: 49 9 kg (110 lb 0 2 oz)      Height: Intake/Output Summary (Last 24 hours) at 11/20/18 0857  Last data filed at 11/20/18 0701   Gross per 24 hour   Intake             1320 ml   Output              400 ml   Net              920 ml     Physical Exam    General: NAD  Skin: no rash  Eyes: anicteric sclera  ENT: moist mucous membrane  Neck: supple  Chest: CTA b/l  CVS: s1s2  Abdomen: soft, nontender  Extremities: weakness LE  : no salgado  Neuro: AAOX3  Psych: normal affect      Invasive Devices:      Lab Results:     Results from last 7 days  Lab Units 11/20/18  0629 11/20/18  0546 11/19/18  2301 11/19/18  1655   WBC Thousand/uL 6 66  --   --  7 59   HEMOGLOBIN g/dL 12 2  --   --  12 6   HEMATOCRIT % 37 0  --   --  38 6   PLATELETS Thousands/uL 245  --   --  277   POTASSIUM mmol/L  --  3 8 3 7 3 8   CHLORIDE mmol/L  --  102 101 95*   CO2 mmol/L  --  21 26 28   BUN mg/dL  --  16 20 22   CREATININE mg/dL  --  1 04 1 12 1 24   CALCIUM mg/dL  --  8 7 9 2 9 6   MAGNESIUM mg/dL  --   --   --  2 7*   ALK PHOS U/L  --  249*  --  285*   ALT U/L  --  15  --  15   AST U/L  --  40  --  64*

## 2018-11-20 NOTE — PHYSICAL THERAPY NOTE
PT EVALUATION   11/20/18 1345   Pain Assessment   Pain Assessment No/denies pain   Home Living   Type of [address] Two level  (stair glide to enter home)   [address] [place] [address] chair   Home Equipment Walker;Stair glide  (stair glide to second floor and enter home)   Additional Comments patient using roller walker prior to admission with assist at times but also independently   Prior Function   Level of [place] Independent with ADLs and functional mobility   Lives With Spouse  (24 hour caregiver)   Receives Help From Family;Personal care attendant   ADL Assistance Independent   IADLs Needs assistance   Comments patient with 24 hour caregiver mostly for household care at this time, patient independent with ADLs   Restrictions/Precautions   Other Precautions Chair Alarm; Bed Alarm; Fall Risk   General   Additional Pertinent History chart reviewed, patient admitted with weakness, gait dysfunction, UTI   Family/Caregiver Present Yes  ()   Cognition   Overall Cognitive Status Impaired   Arousal/Participation Cooperative   Attention Attends with cues to redirect   Orientation Level Oriented X4   Following Commands Follows multistep commands with increased time or repetition   Comments patient slow to respond at times and demonstrating decreased safety awareness as she was standing alone, leaning over the chair upon arrival by therapist  Patient and  both present and encouraged to have assistance for OOB at this time to prevent falls   RLE Assessment   RLE Assessment (ROMWFL, strength 3+/5)   LLE Assessment   LLE Assessment (ROMWFL, strength 3+/5)   Coordination   Movements are Fluid and Coordinated 0   Bed Mobility   Sit to Supine 3  Moderate assistance   Additional items Assist x 1;Verbal cues;LE management   Transfers   Sit to Stand 4  Minimal assistance   Additional items Assist x 1   Stand to Sit 4  Minimal assistance   Additional items Assist x 1   Ambulation/Elevation   Gait Assistance (min to mod assist )   Additional items Assist x 1;Verbal cues; Tactile cues   Assistive Device Rolling walker   Distance 15 feet with change in direction with knees flexed at times, requiring mod assist to recover balance at times, shortened stride length and shuffling gait at times with Parkinson's history   Balance   Static Sitting Fair +   Dynamic Sitting Fair   Static Standing Fair -   Dynamic Standing Poor   Ambulatory Poor   Activity Tolerance   Activity Tolerance Patient limited by fatigue   Nurse Made Aware yes   Assessment   Prognosis Good   Problem List Decreased strength;Decreased range of motion;Decreased endurance; Impaired balance;Decreased mobility; Decreased coordination;Decreased safety awareness   Assessment Patient seen for Physical Therapy evaluation  Patient admitted with Acute cystitis  Comorbidities affecting patient's physical performance include: gait dysfunction, thyroid disease, Parkinson's disease who presents with weakness, falls, arthritis, afib  Personal factors affecting patient at time of initial evaluation include: ambulating with assistive device, inability to ambulate household distances, inability to navigate community distances, inability to navigate level surfaces without external assistance, inability to perform dynamic tasks in community, positive fall history, inability to perform caregiver support/tasks, inability to perform physical activity, inability to perform ADLS and inability to perform IADLS    Prior to admission, patient was independent with functional mobility with walker, independent with ADLS, requiring assist for IADLS, living in a multi-level home, ambulating household distance, having 24 hour care  and home with family assist   Please find objective findings from Physical Therapy assessment regarding body systems outlined above with impairments and limitations including weakness, decreased ROM, impaired balance, decreased endurance, impaired coordination, gait deviations, decreased activity tolerance, decreased functional mobility tolerance, decreased safety awareness and fall risk  The Barthel Index was used as a functional outcome tool presenting with a score of 30 today indicating marked limitations of functional mobility and ADLS  Patient's clinical presentation is currently unstable/unpredictable as seen in patient's presentation of vital sign response, changing level of pain, increased fall risk, new onset of impairment of functional mobility, decreased endurance and new onset of weakness  Pt would benefit from continued Physical Therapy treatment to address deficits as defined above and maximize level of functional mobility  As demonstrated by objective findings, the assigned level of complexity for this evaluation is high  Goals   Patient Goals go home and be independent   STG Expiration Date 11/27/18   Short Term Goal #1 transfers and gait with roller walker with supervision   Short Term Goal #2 gait endurance to functional household distances, strength 3+/4- all to meet patient goal of returning home and being independent   LTG Expiration Date 12/04/18   Long Term Goal #1 transfers and gait with roller walker independently   Long Term Goal #2 gait endurance to functional community distances, strength BLEs 4-/5   Treatment Day 0   Plan   Treatment/Interventions ADL retraining;Functional transfer training;LE strengthening/ROM; Therapeutic exercise; Endurance training;Patient/family training;Equipment eval/education; Bed mobility;Gait training; Compensatory technique education   PT Frequency 5x/wk   Recommendation   Recommendation (home with 24 hour care and PT services vs out pt PT as PTA)   Barthel Index   Feeding 5   Bathing 0   Grooming Score 0   Dressing Score 5   Bladder Score 5   Bowels Score 5   Toilet Use Score 5   Transfers (Bed/Chair) Score 5   Mobility (Level Surface) Score 0   Stairs Score 0   Barthel Index Score 30   Licensure   [place] License Number  Renee Harris  [id number]

## 2018-11-20 NOTE — OCCUPATIONAL THERAPY NOTE
Occupational Therapy Evaluation/Treatment     11/20/18 4011   Note Type   Note type Eval/Treat   Restrictions/Precautions   Other Precautions Chair Alarm; Bed Alarm; Fall Risk  (Parkinson's)   Pain Assessment   Pain Assessment No/denies pain   Home Living   Type of [address] [facility] Two level;Bed/bath upstairs;1/2 bath on main level  (stair glide to enter home)   Bathroom Shower/Tub Tub/shower unit   Bathroom Toilet Standard   Bathroom Equipment Grab bars in shower; Shower chair;Hand-held shower   Bathroom Accessibility Accessible via walker   Home Equipment Walker;Stair glide  (stair glide to enter home and to 2nd floor)   Prior Function   Level of [place] Independent with ADLs and functional mobility; Needs assistance with IADLs   Lives With Spouse  (24 hr  live-in aide)   [address] Help From Family;Personal care attendant   ADL Assistance Independent   IADLs Needs assistance   Falls in the last 6 months 0   Comments Pt reports that she was able to ambulate in her home independently with a RW, independent with self care  Live-in aide is present 24/7 to help with cooking, cleaning and housework  Pt received out-pt PT 3x/week PTA   and aide corroborate all info  Psychosocial   Psychosocial (WDL) WDL   Length of Time/Family Visitation 31 min -1hr  ( and live-in aide)   Subjective   Subjective "I look horrible   Can I wash my hair?"   ADL   Where Assessed Sitting at sink   Eating Assistance 5  Supervision/Setup   Grooming Assistance 4  Minimal Assistance   Grooming Deficit Increased time to complete   UB Bathing Assistance 4  Minimal Assistance   UB Bathing Deficit Increased time to complete   LB Bathing Assistance 3  Moderate Assistance   LB Bathing Deficit Increased time to complete   UB Dressing Assistance 4  Minimal Assistance   UB Dressing Deficit Increased time to complete   LB Dressing Assistance 3  Moderate Assistance   LB Dressing Deficit Increased time to complete   Toileting Assistance  4  Minimal Assistance   Toileting Deficit Increased time to complete   Bed Mobility   Supine to Sit 4  Minimal assistance   Additional items Assist x 1; Increased time required   Transfers   Sit to Stand 3  Moderate assistance   Additional items Assist x 1   Stand to Sit 4  Minimal assistance   Additional items Assist x 1   Stand pivot 3  Moderate assistance   Additional items Assist x 1   Balance   Static Sitting Fair +   Dynamic Sitting Fair   Static Standing Fair -   Dynamic Standing Poor   Ambulatory Fair -  (with RW)   Activity Tolerance   Activity Tolerance Patient limited by fatigue   Nurse Shana Dean RN   RUE Assessment   RUE Assessment WFL  (gross strength 4-/5)   LUE Assessment   LUE Assessment WFL  (gross strength 4-/5)   Hand Function   Gross Motor Coordination Functional   Fine Motor Coordination Functional   Vision-Basic Assessment   Current Vision Wears glasses all the time   Visual History Cataracts   Patient Visual Report ("blurry    I can't see very good  ")   Perception   Spatial Orientation min impaired   Cognition   Overall Cognitive Status Impaired   Arousal/Participation Alert; Cooperative   Attention Attends with cues to redirect   Orientation Level Oriented X4   Memory Within functional limits   Following Commands Follows multistep commands with increased time or repetition   Comments slow, decreased safety awareness, distractible at times   Assessment   Limitation Decreased ADL status; Decreased UE strength;Decreased Safe judgement during ADL;Decreased cognition;Decreased endurance;Visual deficit; Decreased self-care trans;Decreased high-level ADLs  (decreased mood)   Prognosis Good   Assessment Patient evaluated by Occupational Therapy  Patient admitted with Acute cystitis  The patients occupational profile, medical and therapy history includes a extensive additional review of physical, cognitive, or psychosocial history related to current functional performance  Comorbidities affecting functional mobility and ADLS include: Parkinson's disease, recurrent UTIs, hypothyroidism, ambulatory dysfunction  Prior to admission, patient was independent with functional mobility with RW, independent with ADLS, requiring assist for IADLS and ambulating household distance, living with  and live-in aide in a 2 story home with stair glide to enter and to 2nd floor  The evaluation identifies the following performance deficits: weakness, impaired balance, decreased endurance, increased fall risk, decreased ADLS, decreased activity tolerance, decreased safety awareness, decreased strength and visual deficits, that result in activity limitations and/or participation restrictions  This evaluation requires clinical decision making of high complexity, because the patient presents with comorbidites that affect occupational performance and required significant modification of tasks or assistance with consideration of multiple treatment options  The Barthel Index was used as a functional outcome tool presenting with a score of 30, indicating marked limitations of functional mobility and ADLS  Patient will benefit from skilled Occupational Therapy services to address above deficits and facilitate a safe return to prior level of function  Goals   Patient Goals "go home and take care of myself"   STG Time Frame (1-7)   Short Term Goal #1 Patient will increase standing tolerance to 3 minutes during ADL task to decrease assistance level and decrease fall risk; Patient will increase bed mobility to supervision in preparation for ADLS and transfers;  Patient will increase functional mobility to and from bathroom with rolling walker with min assist to increase performance with ADLS and to use a toilet; Patient will tolerate 10 minutes of UE ROM/strengthening to increase general activity tolerance and performance in ADLS/IADLS; Patient will be able to to verbalize understanding and perform energy conservation/proper body mechanics during ADLS and functional mobility at least 75% of the time with minimal cueing to decrease signs of fatigue and increase stamina to return to prior level of function; Patient will increase static/dynamic sitting balance to supervision to improve the ability to sit at edge of bed or on a chair for ADLS;  Patient will increase static/dynamic standing balance to min assist to improve postural stability and decrease fall risk during standing ADLS and transfers  LTG Time Frame (8-14)   Long Term Goal #1 Patient will increase standing tolerance to 7 minutes during ADL task to decrease assistance level and decrease fall risk; Patient will increase bed mobility to independent in preparation for ADLS and transfers; Patient will increase functional mobility to and from bathroom with rolling walker with supervision to increase performance with ADLS and to use a toilet; Patient will improve functional activity tolerance to 15 minutes of sustained functional tasks to increase participation in basic self-care and decrease assistance level;  Patient will be able to to verbalize understanding and perform energy conservation/proper body mechanics during ADLS and functional mobility at least 90% of the time with nocueing to decrease signs of fatigue and increase stamina to return to prior level of function; Patient will increase static/dynamic sitting balance to independent to improve the ability to sit at edge of bed or on a chair for ADLS;  Patient will increase static/dynamic standing balance to supervision to improve postural stability and decrease fall risk during standing ADLS and transfers  Functional Transfer Goals   Pt Will Perform All Functional Transfers With min assist;With assistive devices; With good judgment/safety  (LTG - Supervision)   ADL Goals   Pt Will Perform All ADL's In chair; With min assist;With adaptive equipment; With good judgment/safety  (LTG- Supervision )   Plan Treatment Interventions ADL retraining;Functional transfer training;UE strengthening/ROM; Endurance training;Cognitive reorientation;Patient/family training;Equipment evaluation/education; Neuromuscular reeducation; Compensatory technique education; Activityengagement   Goal Expiration Date 12/04/18   Treatment Day 1   OT Frequency 3-5x/wk   Additional Treatment Session   Start Time 4652   End Time 8258   Treatment Assessment Pt seen for ADL training  Functional ambulation to the bathroom with ModA and cues primarily to increase step length to decrease shuffling gait and to help steer RW  Toilet transfer [address] with cues and grab bar  Toileting hygiene Luis  Pt able to sit in front of bathroom sink to wash face and upper body, brush teeth and comb hair with Luis and increased time  Pt is very slow to move, but coordination is [facility]  Returned to bed with [address] and RW  Education and training with pt,  and live-in aide in [address] [place] [address] exercises and continued functional activities in between therapy sessions to facilitate return to PLOF  Patient left OOB in chair with all needs within reach, tab alarm in place  Cont OT per POC  Recommendation   OT Discharge Recommendation Home OT  (with family and 24 hr supervision)   Barthel Index   Feeding 5   Bathing 0   Grooming Score 0   Dressing Score 5   Bladder Score 5   Bowels Score 5   Toilet Use Score 5   Transfers (Bed/Chair) Score 5   Mobility (Level Surface) Score 0   Stairs Score 0   Barthel Index Score 30   Licensure   [place] License Number  Alex WILBUR Arias/VKNG, [place] Lic# [id number]

## 2018-11-20 NOTE — ASSESSMENT & PLAN NOTE
CT of the abdomen and pelvis revealed left hydronephrosis versus multiple parapelvic cysts  · Renal ultrasound pending  · Check urinary up  · Nephrology consult    Appreciate input

## 2018-11-20 NOTE — H&P
H&P- Sadaf Rincon 1933, 80 y o  female MRN: [id number]    Unit/Bed#: Dg Vogt Encounter: [id number]    Primary Care Provider: Ervin Bloch   Date and time admitted to hospital: 11/19/2018  3:56 PM        * Acute cystitis   Assessment & Plan    · Patient brought to the emergency department by the family for weakness in the bilateral lower extremities and difficulty ambulating  · Patient was seen in the ER couple weeks ago and diagnosed with urinary tract infection  · Urine today consistent with continued UTI  · Patient does have urinary frequency at night  ·  Continue rocephin which was started empirically in the emergency department  · Urine culture sent     Hyponatremia   Assessment & Plan    · Sodium 127 on admission with a history of hyponatremia on previous admissions  · Check serum osmolality, urine lytes, urine osmolality  · Will repeat Na at midnight as patient received a 500cc bolus in ER     Gait abnormality   Assessment & Plan    · Patient has difficulty ambulating at baseline and uses a walker  · Patient was unable to ambulate at all today  · Physical therapy evaluation     Abnormal CT of the abdomen   Assessment & Plan    · CT of the abdomen and pelvis revealed left hydronephrosis versus multiple parapelvic cysts  · Will get a renal ultrasound     Thyroid disease   Assessment & Plan    · Continue synthroid  · Check TSH     Parkinson's disease (HCC)   Assessment & Plan    · Continue sinemet  · Fall precautions  · PT eval         VTE Prophylaxis: Enoxaparin (Lovenox)  / sequential compression device   Code Status: Level 1 - Full Code    Anticipated Length of Stay:  Patient will be admitted on an Observation basis with an anticipated length of stay of  Less than 2 midnights  Justification for Hospital Stay:  Hyponatremia, UTI, generalized weakness    Total Time for Visit, including Counseling / Coordination of Care: 20 minutes    Greater than 50% of this total time spent on direct patient counseling and coordination of care  Chief Complaint:   Weakness - Generalized (generalized weakness started yesterday, whenn walking up to bathroom pt feels that her legs gives up  increase with back pain, new onset with R groin pain)      History of Present Illness:    Renetta Kelly is a 80 y o  female with a PMH of thyroid disease, Parkinson's disease who presents with weakness  Patient states that she went to the bathroom earlier in her legs gave out from under her  She had weakness to have bilateral lower extremities  She was recently treated for urinary tract infection and was compliant with antibiotics, however upon arrival to the emergency department she was found to once again have UTI  She was also found to be hyponatremic  Her family reports that she does have good intake, though neurology notes that she has been decreasing weight in their outpatient visit summary  Family has multiple complaints and wishes them all to be addressed during this visit including her urinary frequency (which predated the urinary tract infection) and her chronic dry mouth  At the time of my evaluation patient denies any pain, though she reported to the emergency department earlier that she was having some back pain  Patient is admitted for the above complaints  Review of Systems:    Review of Systems   HENT: Negative  Eyes: Negative  Respiratory: Positive for cough (Chronic)  Cardiovascular: Negative  Gastrointestinal: Negative  Endocrine: Negative  Genitourinary: Positive for frequency  Musculoskeletal: Negative  Skin: Negative  Allergic/Immunologic: Negative  Neurological: Positive for tremors and weakness  Hematological: Negative  Psychiatric/Behavioral: Negative  Past Medical and Surgical History:     Past Medical History:   Diagnosis Date    Arthritis     Hypothyroidism     Parkinson's disease ([facility] [address] )     Thyroid disease        History reviewed   No pertinent surgical history  Meds/Allergies:    Prior to Admission medications    Medication Sig Start Date End Date Taking? Authorizing Provider   Ascorbic Acid (VITAMIN C PO) Take 1 tablet by mouth daily   Yes Historical Provider, MD   carbidopa-levodopa (SINEMET CR)  mg TBCR per ER tablet Take 1tab tid and 2tabs qhs  Patient taking differently: 1 tablet 4 (four) times a day   5/22/18  Yes Deepti Broussard PA-C   carbidopa-levodopa (SINEMET)  mg per tablet Take 1 5 tablets by mouth 3 (three) times a day 0771,[id number],6872   Yes Historical Provider, MD   Cholecalciferol (VITAMIN D PO) Take 1 tablet by mouth daily   Yes Historical Provider, MD   Coenzyme Q10 (COQ10 PO) Take by mouth   Yes Historical Provider, MD   Cyanocobalamin (VITAMIN B 12 PO) Take 1 tablet by mouth daily   Yes Historical Provider, MD   progesterone (PROMETRIUM) 200 MG capsule Take 200 mg by mouth daily at bedtime     Yes Historical Provider, MD   thyroid desiccated (ARMOUR) 30 mg tablet Take 30 mg by mouth daily   Yes Historical Provider, MD       Allergies: No Known Allergies    Social History:     Marital Status: /Civil Union   Substance Use History:   History   Alcohol Use No     History   Smoking Status    Never Smoker   Smokeless Tobacco    Never Used     History   Drug Use No       Family History:    Family History   Problem Relation Age of Onset    Hypertension Mother     Lung cancer Father        Physical Exam:     Vitals:   Blood Pressure: 154/68 (11/19/18 2100)  Pulse: 61 (11/19/18 2100)  Temperature: (!) 97 4 °F (36 3 °C) (11/19/18 2100)  Temp Source: Oral (11/19/18 1606)  Respirations: 21 (11/19/18 2100)  Height: 5' 1 5" (156 2 cm) (11/19/18 1606)  Weight - Scale: 53 1 kg (117 lb) (11/19/18 1606)  SpO2: 93 % (11/19/18 2100)    Physical Exam   Constitutional: She is oriented to person, place, and time  She appears well-developed  She appears cachectic  HENT:   Head: Normocephalic and atraumatic     Eyes: Pupils are equal, round, and reactive to light  Neck: Normal range of motion  Neck supple  Cardiovascular: Normal rate and regular rhythm  Murmur heard  Pulmonary/Chest: Effort normal and breath sounds normal  No respiratory distress  Abdominal: Soft  Bowel sounds are normal    Musculoskeletal: She exhibits no edema or deformity  Parkinsonian tremors   Neurological: She is alert and oriented to person, place, and time  Skin: Skin is warm and dry  Psychiatric: She has a normal mood and affect  Nursing note and vitals reviewed  Additional Data:     Lab Results: I have personally reviewed pertinent reports  Results from last 7 days  Lab Units 11/19/18  1655   WBC Thousand/uL 7 59   HEMOGLOBIN g/dL 12 6   HEMATOCRIT % 38 6   PLATELETS Thousands/uL 277   NEUTROS PCT % 68       Results from last 7 days  Lab Units 11/19/18  1655   SODIUM mmol/L 127*   POTASSIUM mmol/L 3 8   CHLORIDE mmol/L 95*   CO2 mmol/L 28   BUN mg/dL 22   CREATININE mg/dL 1 24   CALCIUM mg/dL 9 6   TOTAL BILIRUBIN mg/dL 0 80   ALK PHOS U/L 285*   ALT U/L 15   AST U/L 64*                       Imaging: I have personally reviewed pertinent reports  CT renal stone study abdomen pelvis wo contrast   Final Result by Alecia Pa MD (11/19 1926)      1  Probable moderate left hydronephrosis versus multiple parapelvic cysts  Renal ultrasound is recommended for further evaluation  2   Fibroid uterus  I personally discussed this study with Edy Castillo on 11/19/2018 at 7:25 PM                      Workstation performed: [id number]         CT head without contrast   Final Result by Alecia Pa MD (11/19 1900)      No acute intracranial abnormality  Stable examination  Workstation performed: [id number]         XR spine lumbar 2 or 3 views injury   Final Result by Irma Coulter DO (11/19 5837)      Somewhat limited study  No acute osseous abnormality        Mild dextroscoliosis with moderately advanced multilevel degenerative changes  Workstation performed: [id number]             CT renal stone study abdomen pelvis wo contrast   Final Result      1  Probable moderate left hydronephrosis versus multiple parapelvic cysts  Renal ultrasound is recommended for further evaluation  2   Fibroid uterus  I personally discussed this study with Jose Carlos Sifuentes on 11/19/2018 at 7:25 PM                      Workstation performed: [id number]         CT head without contrast   Final Result      No acute intracranial abnormality  Stable examination  Workstation performed: [id number]         XR spine lumbar 2 or 3 views injury   Final Result      Somewhat limited study  No acute osseous abnormality  Mild dextroscoliosis with moderately advanced multilevel degenerative changes  Workstation performed: [id number]             EKG, Pathology, and Other Studies Reviewed on Admission:   · EKG:  Pending    Allscripts / Epic Records Reviewed: Yes     ** Please Note: This note has been constructed using a voice recognition system   **

## 2018-11-20 NOTE — PLAN OF CARE
DISCHARGE PLANNING     Discharge to home or other facility with appropriate resources Progressing        GENITOURINARY - ADULT     Maintains or returns to baseline urinary function Progressing     Absence of urinary retention Progressing        INFECTION - ADULT     Absence or prevention of progression during hospitalization Progressing     Absence of fever/infection during neutropenic period Progressing        Knowledge Deficit     Patient/family/caregiver demonstrates understanding of disease process, treatment plan, medications, and discharge instructions Progressing        METABOLIC, FLUID AND ELECTROLYTES - ADULT     Electrolytes maintained within normal limits Progressing     Fluid balance maintained Progressing        Potential for Falls     Patient will remain free of falls Progressing        Prexisting or High Potential for Compromised Skin Integrity     Skin integrity is maintained or improved Progressing        SAFETY ADULT     Maintain or return to baseline ADL function Progressing     Maintain or return mobility status to optimal level Progressing

## 2018-11-20 NOTE — PROGRESS NOTES
Repeat sodium 138, patient was incontinent of urine x 1 per nursing  Discussed with Dr Joe George - D5W increased to 125 cc/hr  Repeat in 4 hours

## 2018-11-20 NOTE — PROGRESS NOTES
Repeat sodium noted to be 138, concern for overcorrection after 500cc NS bolus    Discussed with Dr Debi Holter from nephrology - will give [email or web address] per hour, encourage oral fluids, monitor mental status and repeat at 3AM

## 2018-11-20 NOTE — PROGRESS NOTES
Renal on call note:    Was called by primary team regarding rapid correction of sodium  Na 127 on adm rapidly improved to 138 in 6 hours after getting IV  ml bolus in ER  No need for fluid restriction  Encourage free water for now  Start IV D5W at 75 ml/hour and check BMP q4 hourly for now  Goal sodium around 133-135 by 5 PM on 11/20/18  Needs close monitoring of UO       Discussed with [facility] from primary team

## 2018-11-20 NOTE — ASSESSMENT & PLAN NOTE
Sodium 127 on admission with a history of hyponatremia on previous admissions  Quickly corrected in 6 hours 138, nephrology consulted, appreciate input, renal ultrasound ordered  Repeat sodium level at 11:00 a m  · Check serum osmolality, urine Na, urine osmolality still pending  · Nephrology consult    Appreciate input

## 2018-11-20 NOTE — ASSESSMENT & PLAN NOTE
· Patient brought to the emergency department by the family for weakness in the bilateral lower extremities and difficulty ambulating  · Patient was seen in the ER couple weeks ago and diagnosed with urinary tract infection  · Urine today consistent with continued UTI  · Patient does have urinary frequency at night  ·  Continue rocephin which was started empirically in the emergency department  · Urine culture sent

## 2018-11-20 NOTE — PLAN OF CARE
Problem: DISCHARGE PLANNING - CARE MANAGEMENT  Goal: Discharge to post-acute care or home with appropriate resources  INTERVENTIONS:  - Conduct assessment to determine patient/family and health care team treatment goals, and need for post-acute services based on payer coverage, community resources, and patient preferences, and barriers to discharge  - Address psychosocial, clinical, and financial barriers to discharge as identified in assessment in conjunction with the patient/family and health care team  - Arrange appropriate level of post-acute services according to patient's   needs and preference and payer coverage in collaboration with the physician and health care team  - Communicate with and update the patient/family, physician, and health care team regarding progress on the discharge plan  - Arrange appropriate transportation to post-acute venues  [facility]  Outcome: Progressing

## 2018-11-20 NOTE — PROGRESS NOTES
Progress Note - Yuki Mike 1933, 80 y o  female MRN: [id number]    Unit/Bed#: Dg Vogt Encounter: [id number]    Primary Care Provider: Robyn Espana   Date and time admitted to hospital: 11/19/2018  3:56 PM        * Acute cystitis   Assessment & Plan    Patient was seen in the ER couple weeks ago and diagnosed with urinary tract infection  Ceftriaxone started in the ED  S/P Keflex x7 days  · Urine today consistent with continued UTI  · Urine culture pending  · Continue Ceftriaxone day 2     Hyponatremia   Assessment & Plan    Sodium 127 on admission with a history of hyponatremia on previous admissions  Quickly corrected in 6 hours 138, nephrology consulted, appreciate input, renal ultrasound ordered  Repeat sodium level at 11:00 a m  · Check serum osmolality, urine Na, urine osmolality still pending  · Nephrology consult  Appreciate input         Abnormal CT of the abdomen   Assessment & Plan    CT of the abdomen and pelvis revealed left hydronephrosis versus multiple parapelvic cysts  · Renal ultrasound pending  · Check urinary up  · Nephrology consult  Appreciate input     Thyroid disease   Assessment & Plan    On levothyroxine  TSH normal   · Continue        Gait abnormality   Assessment & Plan    Patient has difficulty ambulating at baseline and uses a walker  Presented with inability to ambulate and sodium level found to be 127   · PT/OT eval  · Fall precautions  · Trend NA level     Parkinson's disease ([facility] [address] )   Assessment & Plan    On Sinemet RTC  ·  Continue sinemet  · Fall precautions  · PT/OT eval             VTE Pharmacologic Prophylaxis:   Pharmacologic: Enoxaparin (Lovenox)  Mechanical VTE Prophylaxis in Place: Yes    Patient Centered Rounds: I have performed bedside rounds with nursing staff today  Discussions with Specialists or Other Care Team Provider: Yes  Education and Discussions with Family / Patient:Yes   at bedside   I have answered all questions to the best of my ability and knowledge  Time Spent for Care: 30 minutes  More than 50% of total time spent on counseling and coordination of care as described above  Current Length of Stay: 0 day(s)  Current Patient Status: Observation     Discharge Plan:  Has been once patient home  Patient has to caregiver at home  Code Status: Level 1 - Full Code      Subjective:     Patient denies any discomfort  However, poor historian   at bedside  Objective:     Vitals:     Temp (24hrs), Av 1 °F (36 7 °C), Min:97 4 °F (36 3 °C), Max:98 5 °F (36 9 °C)    Temp:  [97 4 °F (36 3 °C)-98 5 °F (36 9 °C)] 98 5 °F (36 9 °C)  HR:  [58-76] 58  Resp:  [16-21] 16  BP: (114-213)/() 134/92  SpO2:  [92 %-98 %] 94 %  Body mass index is 20 45 kg/m²  Input and Output Summary (last 24 hours): Intake/Output Summary (Last 24 hours) at 18 1050  Last data filed at 18 0701   Gross per 24 hour   Intake             1320 ml   Output              400 ml   Net              920 ml        Physical Exam:     Physical Exam   Constitutional:   Frail looking   HENT:   Head: Normocephalic and atraumatic  Dry oral mucosa   Neck: Normal range of motion  Neck supple  Cardiovascular: Normal rate and regular rhythm  No murmur heard  S1-S2 noted   Pulmonary/Chest: Breath sounds normal  No respiratory distress  She has no rales  Diminished breath sounds but clear due to poor effort   Abdominal: Soft  Bowel sounds are normal  She exhibits no distension  There is no tenderness  There is no rebound  Hypoactive bowel sounds x4 quads   Musculoskeletal: Normal range of motion  She exhibits no edema  Neurological: She is alert  Oriented to self and place   Skin: Skin is warm and dry     Psychiatric: Her behavior is normal        Additional Data:     Labs:      Results from last 7 days  Lab Units 18  0629 18  1655   WBC Thousand/uL 6 66 7 59   HEMOGLOBIN g/dL 12 2 12 6   HEMATOCRIT % 37 0 38 6   PLATELETS Thousands/uL 245 277   NEUTROS PCT %  --  68       Results from last 7 days  Lab Units 11/20/18  0546 11/20/18  0253 11/19/18  2301 11/19/18  1655   SODIUM mmol/L 133* 138 138 127*   POTASSIUM mmol/L 3 8  --  3 7 3 8   CHLORIDE mmol/L 102  --  101 95*   CO2 mmol/L 21  --  26 28   BUN mg/dL 16  --  20 22   CREATININE mg/dL 1 04  --  1 12 1 24   CALCIUM mg/dL 8 7  --  9 2 9 6   TOTAL BILIRUBIN mg/dL 0 40  --   --  0 80   ALK PHOS U/L 249*  --   --  285*   ALT U/L 15  --   --  15   AST U/L 40  --   --  64*             No results found for: HGBA1C            * I Have Reviewed All Lab Data Listed Above  * Additional Pertinent Lab Tests Reviewed: [facility] Ad[facility] Reviewed    Imaging:     CT renal stone study abdomen pelvis wo contrast   Final Result by Andrei Hubbard MD (11/19 1926)      1  Probable moderate left hydronephrosis versus multiple parapelvic cysts  Renal ultrasound is recommended for further evaluation  2   Fibroid uterus  I personally discussed this study with Truman Knight on 11/19/2018 at 7:25 PM                      Workstation performed: [id number]         CT head without contrast   Final Result by Andrei Hubbard MD (11/19 1900)      No acute intracranial abnormality  Stable examination  Workstation performed: [id number]         XR spine lumbar 2 or 3 views injury   Final Result by Reema Dent DO (11/19 0247)      Somewhat limited study  No acute osseous abnormality  Mild dextroscoliosis with moderately advanced multilevel degenerative changes  Workstation performed: [id number]         US kidney and bladder    (Results Pending)     Imaging Reports Reviewed by myself    Cultures:   Blood Culture:   Lab Results   Component Value Date    BLOODCX No Growth After 5 Days  02/23/2017    BLOODCX No Growth After 5 Days   02/23/2017     Urine Culture:   Lab Results   Component Value Date    URINECX >100,000 cfu/ml Escherichia coli 02/23/2017    URINECX Culture results to follow  02/26/2016    URINECX 50,000-59,000 cfu/ml Klebsiella pneumoniae 02/26/2016     Sputum Culture: No components found for: SPUTUMCX  Wound Culture: No results found for: WOUNDCULT    Last 24 Hours Medication List:     Current Facility-Administered Medications:  acetaminophen 650 mg Oral Once Anna Anepu, DO   ascorbic acid 500 mg Oral Daily Gove Rodríguez, CRNP   carbidopa-levodopa 1 tablet Oral 4x Daily Zayra Rodríguez, CRNP   carbidopa-levodopa 1 5 tablet Oral TID Zayraj carlos Hadleyy, MAZIN   cefTRIAXone 1,000 mg Intravenous Q24H Artie Damon MD   enoxaparin 30 mg Subcutaneous Daily Zayra Rodríguez, MAZIN   insulin glargine       levothyroxine 50 mcg Oral Early Morning MAZIN Oquendo        Today, Patient Was Seen By: MAZIN Gibbs    ** Please Note: Dragon 360 Dictation voice to text software may have been used in the creation of this document   **

## 2018-11-20 NOTE — SOCIAL WORK
PT LIVES WITH SPOUSE AND PRIVATE LIVE IN CARE GIVERS  PT USES A WALKER, HAS A CANE, USES [facility] FOR RX NEEDS  DCP IS TO HOME WITH  AND PRIVATE CARE GIVERS  DASH discussion completed  Discussed goals of making sure pt's  needs are met upon discharge, pt's preferences are taken into account, pt understands health condition, medications and symptoms to watch for after returning home and pt is aware of any follow up appointments recommended by hospital physician

## 2018-11-20 NOTE — UTILIZATION REVIEW
Initial Clinical Review  Admission: Date/Time/Statement:  Admission Orders     Ordered        11/19/18 1953  Place in Observation (expected length of stay for this patient is less than two midnights)  Once             Orders Placed This Encounter   Procedures    Place in Observation (expected length of stay for this patient is less than two midnights)     Standing Status:   Standing     Number of Occurrences:   1     Order Specific Question:   Admitting Physician     Answer:   Jean Neville     Order Specific Question:   Level of Care     Answer:   Med Surg [16]   ED: Date/Time/Mode of Arrival:   ED Arrival Information     Expected Arrival Acuity Means of Arrival Escorted By Service Admission Type    - 11/19/2018 15:56 Urgent Ambulance [facility] General Medicine Urgent    Arrival Complaint    weakness      Chief Complaint:   Chief Complaint   Patient presents with    Weakness - Generalized     generalized weakness started yesterday, whenn walking up to bathroom pt feels that her legs gives up  increase with back pain, new onset with R groin pain   History of Illness:   80 y o  female with a PMH of thyroid disease, Parkinson's disease who presents with weakness  Patient states that she went to the bathroom earlier in her legs gave out from under her  She had weakness to have bilateral lower extremities  She was recently treated for urinary tract infection and was compliant with antibiotics, however upon arrival to the emergency department she was found to once again have UTI  She was also found to be hyponatremic     ED Vital Signs:   ED Triage Vitals [11/19/18 1606]   Temperature Pulse Respirations Blood Pressure SpO2   98 4 °F (36 9 °C) 63 19 (!) 213/83 96 %      Temp Source Heart Rate Source Patient Position - Orthostatic VS BP Location FiO2 (%)   Oral Monitor Lying Left arm --      Pain Score       No Pain        Wt Readings from Last 1 Encounters:   11/19/18 49 9 kg (110 lb 0 2 oz)   Vital Signs (abnormal):   /108  Abnormal Labs/Diagnostic Test Results:   U/A+LEUK, NITRITE, KETONES, BLOOD, BACTERIA  >133  LUMBAR SPINE XRAY=Somewhat limited study  No acute osseous abnormality  Mild dextroscoliosis with moderately advanced multilevel degenerative changes  CT HEAD=No acute intracranial abnormality  Stable examination  CT RENAL STONE STUDY=1  Probable moderate left hydronephrosis versus multiple parapelvic cysts  Renal ultrasound is recommended for further evaluation  2   Fibroid uterus  ED Treatment:   Medication Administration from 11/19/2018 1556 to 11/19/2018 2051       Date/Time Order Dose Route Action Action by Comments     11/19/2018 1637 carbidopa-levodopa (SINEMET)  mg per tablet 1 tablet 1 tablet Oral Given Gaby More, RN      11/19/2018 1706 morphine injection 2 mg 2 mg Intravenous Not Given Gaby More, RN      11/19/2018 1705 acetaminophen (TYLENOL) tablet 650 mg 650 mg Oral Not Given Gaby More, RN      11/19/2018 1637 carbidopa-levodopa (SINEMET CR)  mg per ER tablet 1 tablet 1 tablet Oral Given Gaby More, RN      11/19/2018 1809 hydrALAZINE (APRESOLINE) injection 5 mg 0 mg Intravenous Hold Gaby More, RN as per Dr Roberto Rincon - Bp 174/108     11/19/2018 2008 sodium chloride 0 9 % bolus 500 mL 0 mL Intravenous Stopped Gaby More, RN      11/19/2018 1810 sodium chloride 0 9 % bolus 500 mL 500 mL Intravenous [address] Gaby More, RN      11/19/2018 2015 cefTRIAXone (ROCEPHIN) IVPB (premix) 1,000 mg 1,000 mg Intravenous New Bag Gaby More, RN       Past Medical/Surgical History:    Active Ambulatory Problems     Diagnosis Date Noted    Paroxysmal A-fib [facility]) 02/25/2016    Parkinson's disease ([facility] [address] ) 02/25/2016    Hypothyroidism     UTI (urinary tract infection) 02/28/2016    Lethargy 02/23/2017    Hyponatremia 02/23/2017    Essential hypertension 02/23/2017    Falls frequently 02/23/2017    Influenza B 02/24/2017    Acute metabolic encephalopathy 02/25/2017    Weight loss 07/24/2018    Arthritis      Resolved Ambulatory Problems     Diagnosis Date Noted    No Resolved Ambulatory Problems     Past Medical History:   Diagnosis Date    Arthritis     Hypothyroidism     Parkinson's disease ([facility] [place] [address] )     Thyroid disease    Admitting Diagnosis: Hyponatremia [E87 1]  UTI (urinary tract infection) [N39 0]  Weakness [R53 1]  Age/Sex: 80 y o  female  Assessment/Plan:   Acute cystitis   Assessment & Plan     · Patient brought to the emergency department by the family for weakness in the bilateral lower extremities and difficulty ambulating  · Patient was seen in the ER couple weeks ago and diagnosed with urinary tract infection  · Urine today consistent with continued UTI  · Patient does have urinary frequency at night  ·  Continue rocephin which was started empirically in the emergency department  · Urine culture sent      Hyponatremia   Assessment & Plan     · Sodium 127 on admission with a history of hyponatremia on previous admissions  · Check serum osmolality, urine lytes, urine osmolality  · Will repeat Na at midnight as patient received a 500cc bolus in ER   Admission Orders:  TELEMETRY  PT/OT EVAL & TX  CONSULT RENAL  NEUROVASCULAR CHECKS Q2H  VENODYNES  Scheduled Meds:   Current Facility-Administered Medications:  acetaminophen 650 mg Oral Once Anna Anepu, DO   ascorbic acid 500 mg Oral Daily [place], CRNP   carbidopa-levodopa 1 tablet Oral 4x Daily [place], CRNP   carbidopa-levodopa 1 5 tablet Oral TID [place], CRNP   enoxaparin 30 mg Subcutaneous Daily [place], CRNP   insulin glargine       levothyroxine 50 mcg Oral Early Morning Gladys MilledgevilleMAZIN stephen     Continuous Infusions:  IVF @ 125CC/HR    PRN Meds:

## 2018-11-21 PROBLEM — R74.8 ELEVATED ALKALINE PHOSPHATASE LEVEL: Status: ACTIVE | Noted: 2018-01-01

## 2018-11-21 NOTE — PROGRESS NOTES
On call renal  Sodium decreased to 134 from 138 with 5 hrs of D5w drip  Sodium now within goal  Will monitor off D5w  Goal sodium by AM would be around 136-137

## 2018-11-21 NOTE — PLAN OF CARE
Problem: OCCUPATIONAL THERAPY ADULT  Goal: Performs self-care activities at highest level of function for planned discharge setting  See evaluation for individualized goals  Outcome: Progressing  Limitation: Decreased ADL status, Decreased UE strength, Decreased Safe judgement during ADL, Decreased cognition, Decreased endurance, Visual deficit, Decreased self-care trans, Decreased high-level ADLs (decreased mood)  Prognosis: Good  Assessment: Patient is cooperative and pleasant  Patient is slow to respond at times  Patient requires min/mod assist for transfers and ADLS        OT Discharge Recommendation: 24 hour supervision/assist (home OT)

## 2018-11-21 NOTE — ASSESSMENT & PLAN NOTE
Patient was seen in the ER couple weeks ago and diagnosed with urinary tract infection  Ceftriaxone started in the ED  S/P Keflex x7 days at home  Hx of   · Urine today consistent with UTI  · Urine culture showing negative rods   Awaiting sensitivities  · Continue Ceftriaxone day 3

## 2018-11-21 NOTE — ASSESSMENT & PLAN NOTE
Sodium 127 on admission with a history of hyponatremia on previous admissions  Quickly corrected in 6 hours 138, nephrology consulted, appreciate input  · NA level 140  Of note from nephrology goal is 136-37  Now on D5W at 75/ml  Repeat sodium level at 1300  · Serum osmolality 290, urine Na 23, urine osmolality 437  · Nephrology consult    Appreciate input

## 2018-11-21 NOTE — PHYSICIAN ADVISOR
Current patient class: Inpatient  The patient is currently on Hospital Day: 2 at [address] [place] [address]      The patient was admitted to the hospital at [phone] on 11/20/18 for the following diagnosis:  Hyponatremia [E87 1]  UTI (urinary tract infection) [N39 0]  Weakness [R53 1]       There is documentation in the medical record of an expected length of stay of at least 2 midnights  The patient is therefore expected to satisfy the 2 midnight benchmark and given the 2 midnight presumption is appropriate for INPATIENT ADMISSION  Given this expectation of a satisfying stay, CMS instructs us that the patient is most often appropriate for inpatient admission under part A provided medical necessity is documented in the chart  After review of the relevant documentation, labs, vital signs and test results, the patient is appropriate for INPATIENT ADMISSION  Admission to the hospital as an inpatient is a complex decision making process which requires the practitioner to consider the patients presenting complaint, history and physical examination and all relevant testing  With this in mind, in this case, the patient was deemed appropriate for INPATIENT ADMISSION  After review of the documentation and testing available at the time of the admission I concur with this clinical determination of medical necessity  Rationale is as follows: The patient is a 80 yrs old Female who presented to the ED at 11/19/2018  3:56 PM with a chief complaint of Weakness - Generalized (generalized weakness started yesterday, whenn walking up to bathroom pt feels that her legs gives up  increase with back pain, new onset with R groin pain)     Given the need for further hospitalization, and along with the documentation of medical necessity present in the chart, the patient is appropriate for inpatient admission  The patient is expected to satisfy the 2 midnight benchmark, and will require further acute medical care   The patient does have comorbid conditions which increases the risk for significant adverse outcome  Given this the patient is appropriate for inpatient admission  The patients vitals on arrival were ED Triage Vitals [11/19/18 1606]   Temperature Pulse Respirations Blood Pressure SpO2   98 4 °F (36 9 °C) 63 19 (!) 213/83 96 %      Temp Source Heart Rate Source Patient Position - Orthostatic VS BP Location FiO2 (%)   Oral Monitor Lying Left arm --      Pain Score       No Pain           Past Medical History:   Diagnosis Date    Arthritis     Hypothyroidism     Parkinson's disease ([facility] [address] )     Thyroid disease      History reviewed  No pertinent surgical history  Consults have been placed to:   IP CONSULT TO CASE MANAGEMENT  IP CONSULT TO NEPHROLOGY  IP CONSULT TO UROLOGY    Vitals:    11/20/18 0700 11/20/18 1001 11/20/18 1412 11/20/18 2112   BP: (!) 171/85 134/92 113/53 141/78   BP Location: Right arm  Right arm Left arm   Pulse: 58  66 63   Resp: 16 18 18   Temp: 98 5 °F (36 9 °C)  97 8 °F (36 6 °C) 97 6 °F (36 4 °C)   TempSrc: Tympanic  Oral Oral   SpO2: 94%  95% 95%   Weight:       Height:           Most recent labs:    Recent Labs      11/19/18   1655   11/20/18   0546  11/20/18   0629   WBC  7 59   --    --   6 66   HGB  12 6   --    --   12 2   HCT  38 6   --    --   37 0   PLT  277   --    --   245   K  3 8   < >  3 8   --    CALCIUM  9 6   < >  8 7   --    BUN  22   < >  16   --    CREATININE  1 24   < >  1 04   --    CKTOTAL  37   --    --    --    AST  64*   --   40   --    ALT  15   --   15   --    ALKPHOS  285*   --   249*   --     < > = values in this interval not displayed         Scheduled Meds:  Current Facility-Administered Medications:  acetaminophen 650 mg Oral Once Anna Anepu, DO    artificial tear  Both Eyes HS Haley Dumas, CRNP    ascorbic acid 500 mg Oral Daily Marylouise Gopi, CRNP    carbidopa-levodopa 1 tablet Oral 4x Daily Marylouise Burow, CRNP    carbidopa-levodopa 1 5 tablet Oral TID Dossie MAZIN Rucker    cefTRIAXone 1,000 mg Intravenous Q24H Sydney Roque MD Last Rate: 1,000 mg (11/20/18 1956)   enoxaparin 30 mg Subcutaneous Daily Dossie MAZIN Rucker    levothyroxine 50 mcg Oral Early Morning Dossie MAZIN Rucker    melatonin 6 mg Oral HS Dossie MAZIN Rucker    sodium chloride 1 spray Each Nare PRN Sydney Roque MD      Continuous Infusions:   PRN Meds: sodium chloride    Surgical procedures (if appropriate):

## 2018-11-21 NOTE — PROGRESS NOTES
Progress Note - Ciera Anand 1933, 80 y o  female MRN: [id number]    Unit/Bed#: [address] Encounter: [id number]    Primary Care Provider: Stevie Orosco   Date and time admitted to hospital: 11/19/2018  3:56 PM        * Acute cystitis   Assessment & Plan    Patient was seen in the ER couple weeks ago and diagnosed with urinary tract infection  Ceftriaxone started in the ED  S/P Keflex x7 days at home  Hx of   · Urine today consistent with UTI  · Urine culture showing negative rods  Awaiting sensitivities  · Continue Ceftriaxone day 3       Hyponatremia   Assessment & Plan    Sodium 127 on admission with a history of hyponatremia on previous admissions  Quickly corrected in 6 hours 138, nephrology consulted, appreciate input  · NA level 140  Of note from nephrology goal is 136-37  Now on D5W at 75/ml  Repeat sodium level at 1300  · Serum osmolality 290, urine Na 23, urine osmolality 437  · Nephrology consult  Appreciate input         Elevated alkaline phosphatase level   Assessment & Plan    Escalating Alk phos level 285>>249>>291 but normal liver biomarkers  She denies abdominal discomfort however, patient poor historian  · GGTP elevated  Pt for CT Scan A/P per Dr Cayla Stephen  Abnormal CT of the abdomen   Assessment & Plan    CT of the abdomen and pelvis revealed left hydronephrosis versus multiple parapelvic cysts  · Renal ultrasound showed ormal right kidney with moderate left hydronephrosis  Urology consult  · Check PVR 0>>25ml       Thyroid disease   Assessment & Plan    The On levothyroxine  TSH normal   · Continue        Gait abnormality   Assessment & Plan    Patient has difficulty ambulating at baseline and uses a walker  Presented with inability to ambulate and sodium level found to be 127   · PT/OT eval  · Fall precautions  · Trend NA level     Parkinson's disease ([address] )   Assessment & Plan    On Sinemet RTC    · Continue sinemet  · Fall precautions  · PT/OT eval             VTE Pharmacologic Prophylaxis:   Pharmacologic: Enoxaparin (Lovenox)  Mechanical VTE Prophylaxis in Place: Yes    Patient Centered Rounds: I have performed bedside rounds with nursing staff today  Discussions with Specialists or Other Care Team Provider: Yes  Education and Discussions with Family / Patient:Yes  I have answered all questions to the best of my ability and knowledge  Time Spent for Care: 30 minutes  More than 50% of total time spent on counseling and coordination of care as described above  Current Length of Stay: 1 day(s)  Current Patient Status: Inpatient     Discharge Plan: Home with 2 care takers  Code Status: Level 1 - Full Code      Subjective:     Sitting in the commode verbalizes no complaints  Patient poor historian   at bedside updated with patient's clinical condition  Objective:     Vitals:   Temp (24hrs), Av 7 °F (36 5 °C), Min:97 6 °F (36 4 °C), Max:97 8 °F (36 6 °C)    Temp:  [97 6 °F (36 4 °C)-97 8 °F (36 6 °C)] 97 8 °F (36 6 °C)  HR:  [55-66] 55  Resp:  [16-18] 16  BP: (113-150)/(53-82) 150/82  SpO2:  [95 %-97 %] 97 %  Body mass index is 20 45 kg/m²  Input and Output Summary (last 24 hours): Intake/Output Summary (Last 24 hours) at 18 1219  Last data filed at 18 1027   Gross per 24 hour   Intake              170 ml   Output              125 ml   Net               45 ml        Physical Exam:     Physical Exam   Constitutional: She appears well-developed and well-nourished  Frail looking   HENT:   Head: Normocephalic and atraumatic  Neck: Normal range of motion  Neck supple  Cardiovascular: Normal rate and regular rhythm  No murmur heard  Pulmonary/Chest: Effort normal and breath sounds normal  No respiratory distress  She has no wheezes  She has no rales  Abdominal: Soft  Bowel sounds are normal  She exhibits no distension  There is no tenderness  There is no rebound  Musculoskeletal: Normal range of motion   She exhibits no edema or deformity  Neurological: She is alert  Oriented to self and surroundings   Skin: Skin is warm and dry  Psychiatric: She has a normal mood and affect  Her behavior is normal        Additional Data:     Labs:      Results from last 7 days  Lab Units 11/21/18  0532 11/20/18  0629 11/19/18  1655   WBC Thousand/uL 5 39 6 66 7 59   HEMOGLOBIN g/dL 13 0 12 2 12 6   HEMATOCRIT % 39 6 37 0 38 6   PLATELETS Thousands/uL 299 245 277   NEUTROS PCT %  --   --  68       Results from last 7 days  Lab Units 11/21/18  0532 11/20/18  2035 11/20/18  1110 11/20/18  0546  11/19/18  2301 11/19/18  1655   SODIUM mmol/L 140 134* 138 133*  < > 138 127*   POTASSIUM mmol/L 3 6  --   --  3 8  --  3 7 3 8   CHLORIDE mmol/L 104  --   --  102  --  101 95*   CO2 mmol/L 26  --   --  21  --  26 28   BUN mg/dL 16  --   --  16  --  20 22   CREATININE mg/dL 0 94  --   --  1 04  --  1 12 1 24   CALCIUM mg/dL 9 0  --   --  8 7  --  9 2 9 6   TOTAL BILIRUBIN mg/dL 0 30  --   --  0 40  --   --  0 80   ALK PHOS U/L 291*  --   --  249*  --   --  285*   ALT U/L 15  --   --  15  --   --  15   AST U/L 34  --   --  40  --   --  64*   < > = values in this interval not displayed  No results found for: HGBA1C        Results from last 7 days  Lab Units 11/21/18  0532   PROCALCITONIN ng/ml 0 34*       * I Have Reviewed All Lab Data Listed Above  * Additional Pertinent Lab Tests Reviewed: [address] Admission Reviewed    Imaging:     US kidney and bladder   Final Result by Tin Benites MD (11/20 1507)      Normal right kidney  Moderate left hydronephrosis  Workstation performed: [id number]         CT renal stone study abdomen pelvis wo contrast   Final Result by Amilcar Franco MD (11/19 1926)      1  Probable moderate left hydronephrosis versus multiple parapelvic cysts  Renal ultrasound is recommended for further evaluation  2   Fibroid uterus         I personally discussed this study with Imelda Shah on 11/19/2018 at 7:25 PM                      Workstation performed: [id number]         CT head without contrast   Final Result by John Cantrell MD (11/19 1900)      No acute intracranial abnormality  Stable examination  Workstation performed: [id number]         XR spine lumbar 2 or 3 views injury   Final Result by Trang Mcguire DO (11/19 1463)      Somewhat limited study  No acute osseous abnormality  Mild dextroscoliosis with moderately advanced multilevel degenerative changes  Workstation performed: [id number]         CT abdomen pelvis w wo contrast    (Results Pending)     Imaging Reports Reviewed by myself    Cultures:   Blood Culture:   Lab Results   Component Value Date    BLOODCX No Growth After 5 Days  02/23/2017    BLOODCX No Growth After 5 Days  02/23/2017     Urine Culture:   Lab Results   Component Value Date    URINECX >100,000 cfu/ml Gram Negative Miguel Enteric Like (A) 11/19/2018    URINECX >100,000 cfu/ml Escherichia coli 02/23/2017    URINECX Culture results to follow   02/26/2016    URINECX 50,000-59,000 cfu/ml Klebsiella pneumoniae 02/26/2016     Sputum Culture: No components found for: SPUTUMCX  Wound Culture: No results found for: WOUNDCULT    Last 24 Hours Medication List:     Current Facility-Administered Medications:  acetaminophen 650 mg Oral Once Anna Bowser DO    artificial tear  Both Eyes HS HaleyMAZIN Mane    ascorbic acid 500 mg Oral Daily Mary AnnELLIOT CisseNP    carbidopa-levodopa 1 tablet Oral 4x Daily MAZIN Levi    carbidopa-levodopa 1 5 tablet Oral TID MAZIN Levi    cefTRIAXone 1,000 mg Intravenous Q24H Evangelina Guzmán MD Last Rate: 1,000 mg (11/20/18 1956)   dextrose 75 mL/hr Intravenous Continuous Rico Amezquita MD Last Rate: 75 mL/hr (11/21/18 0827)   enoxaparin 30 mg Subcutaneous Daily MAZIN Levi    levothyroxine 50 mcg Oral Early Morning MAZIN Levi    melatonin 6 mg Oral HS MAZIN Arriola    sodium chloride 1 spray Each Nare PRN Ricardo Durham MD         Today, Patient Was Seen By: MAZIN Hull    ** Please Note: Dragon 360 Dictation voice to text software may have been used in the creation of this document   **

## 2018-11-21 NOTE — PHYSICAL THERAPY NOTE
PT TREATMENT     11/21/18 1632   Pain Assessment   Pain Assessment No/denies pain   Restrictions/Precautions   Other Precautions Chair Alarm; Bed Alarm; Fall Risk   General   Chart Reviewed Yes   Family/Caregiver Present ( initially, then left to go to cafe )   Cognition   Overall Cognitive Status Impaired   Arousal/Participation Cooperative   Following Commands Follows multistep commands with increased time or repetition   Comments slow to respond   Subjective   Subjective "I had therapy"  referring to OT   Transfers   Sit to Stand 4  Minimal assistance   Additional items Assist x 1;Verbal cues   Stand to Sit 3  Moderate assistance   Additional items Assist x 1;Verbal cues   Ambulation/Elevation   Gait pattern Short stride; Forward Flexion;Narrow GRETA   Gait Assistance 3  Moderate assist   Additional items Assist x 1;Verbal cues; Tactile cues   Assistive Device Rolling walker   Distance 12 feet with change in direction   Activity Tolerance   Activity Tolerance Treatment limited secondary to medical complications (Comment)  (limited by cognition and Parkinson like gait)   Exercises   Hip Flexion Sitting;10 reps;Bilateral   Hip Adduction Sitting;10 reps  (via pillow squeeze)   Knee AROM Long Arc Quad Sitting;10 reps;Bilateral   Ankle Pumps Sitting;10 reps;Bilateral   Assessment   Prognosis Good   Problem List Decreased strength;Decreased endurance; Impaired balance;Decreased mobility; Decreased coordination;Decreased cognition; Impaired judgement   Assessment Pt presents in chair, agreeable to PT with some encouragement  Pt needs assist with walking due to shuffling steps and cues to advance her feet especially the left foot at times  Cues for stand to sit in chair  Cont  PT   Goals   Patient Goals return home   Treatment Day 1   Plan   Treatment/Interventions Functional transfer training; Therapeutic exercise; Endurance training;Cognitive reorientation;Patient/family training;Equipment eval/education; Bed mobility;Gait training;Spoke to nursing;OT   Progress Progressing toward goals   Recommendation   Recommendation (home with 24 hour care/supervision and services as needed)   Licensure   [place] License Number  Robert Velarde PT  [id number]   in chair, RN present for meds; seat alarm on, tray in front of pt

## 2018-11-21 NOTE — ASSESSMENT & PLAN NOTE
CT of the abdomen and pelvis revealed left hydronephrosis versus multiple parapelvic cysts  · Renal ultrasound showed ormal right kidney with moderate left hydronephrosis    Urology consult  · Check PVR 0>>25ml

## 2018-11-21 NOTE — ASSESSMENT & PLAN NOTE
Escalating Alk phos level 285>>249>>291 but normal liver biomarkers  She denies abdominal discomfort however, patient poor historian  · GGTP elevated  Pt for CT Scan A/P per Dr Anabella Hill

## 2018-11-21 NOTE — OCCUPATIONAL THERAPY NOTE
OT TREATMENT       11/21/18 1525   Restrictions/Precautions   Other Precautions Chair Alarm; Bed Alarm; Fall Risk   General   Family/Caregiver Present pts caregiver    Pain Assessment   Pain Assessment No/denies pain   ADL   LB Dressing Assistance 4  Minimal Assistance   LB Dressing Deficit Don/doff R sock; Don/doff L sock   LB Dressing Comments seated in chair with 1 loss of balance to left requiring assist to correct   Transfers   Sit to Stand 3  Moderate assistance   Stand to Sit 3  Moderate assistance   Stand pivot 3  Moderate assistance   Toilet transfer 3  Moderate assistance   Additional items Commode   Functional Mobility   Functional Mobility (min/mod assist )   Additional Comments 5 feet x 2    Additional items Rolling walker   ROM- Right Upper Extremities   R Shoulder AAROM; Flexion; Horizontal ABduction   R Elbow AROM;Elbow flexion;Elbow extension   R Hand AROM; Index finger; Thumb; Long finger;Ring finger;Little finger   R Weight/Reps/Sets 10 times each seated in chair    ROM - Left Upper Extremities    L Shoulder AROM; Flexion; Horizontal ABduction   L Elbow AROM;Elbow flexion;Elbow extension   L Hand AROM; Index finger; Thumb; Long finger;Ring finger;Little finger   L Weight/Reps/Sets 10 times each seated in chair    Assessment   Assessment Patient is cooperative and pleasant  Patient is slow to respond at times  Patient requires min/mod assist for transfers and ADLS  Plan   Treatment Interventions ADL retraining;Functional transfer training; Endurance training;UE strengthening/ROM; Patient/family training;Equipment evaluation/education; Activityengagement   Treatment Day 2   Recommendation   OT Discharge Recommendation 24 hour supervision/assist  (home OT)   Licensure   [place] License Number  Abhijit Grant Lawrence Curt 87 OTR/L [id number]

## 2018-11-21 NOTE — PROGRESS NOTES
[facility] [place] 80 y o  female MRN: [id number]  Unit/Bed#: 2 [place] [address] Encounter: [id number]  Reason for Consult: hyponatremia, with rapid correction    ASSESSMENT and PLAN:    80 y o  female with PMhx of parkinson's, hypothyroid, who was admitted to [facility] after presenting with weakness on 11/19  A renal consultation is requested today for assistance in the management of hyponatremia     1) hyponatremia - likely prerenal etiology given rapid improvement with NS     - initially sodium was 127 and rapid correction to 138 with  cc (do not believe that 500 cc caused Na to go from 127 to 138)  - patient required D5 W overnight and also this morning due to rising sodium  -if the sodium level this afternoon is in the 130s, we can monitor conservatively with a BMP in the morning  Overall rise from 2 days ago to today is relatively safe   -patient needs to be encouraged to drink free water     2) renal function - relatively stable this year, though Cr is higher than last year  Unclear if this is new baseline     - CT imaging with hydro vs cysts  - renal u/s- right kidney 8 9 cm, left kidney 11 1 cm, moderate left hydronephrosis  - would have Urology evaluate  Paged Primary Team     3) HTN     - if BP remains above 160 syst, can start amlodipine low dose 2 5 mg and titrate     4) pyuria -      - antibiotics per Primary Team  - f/u final cultures     5) ambulatory dysfunction     - eval in progress per Primary Team     6) elevated alk phos     - as per Primary Team  - GGT pending    SUBJECTIVE / INTERVAL HISTORY:  Pt denies complaints  No SOB      OBJECTIVE:  Current Weight: Weight - Scale: 49 9 kg (110 lb 0 2 oz)  Vitals:    11/20/18 1001 11/20/18 1412 11/20/18 2112 11/21/18 0807   BP: 134/92 113/53 141/78 150/82   BP Location:  Right arm Left arm Right arm   Pulse:  66 63 55   Resp:  18 18 16   Temp:  97 8 °F (36 6 °C) 97 6 °F (36 4 °C) 97 8 °F (36 6 °C)   TempSrc: Oral Oral Oral   SpO2:  95% 95% 97%   Weight:       Height:           Intake/Output Summary (Last 24 hours) at 11/21/18 4195  Last data filed at 11/21/18 0601   Gross per 24 hour   Intake              170 ml   Output              100 ml   Net               70 ml     General: NAD  Skin: no rash  Eyes: anicteric sclera  ENT: moist mucous membrane  Neck: supple  Chest: CTA b/l  CVS: s1s2  Abdomen: soft, nontender  Extremities: weakness LE  : no salgado  Neuro: AAOX3  Psych: normal affect    Medications:    Current Facility-Administered Medications:     acetaminophen (TYLENOL) tablet 650 mg, 650 mg, Oral, Once, Anna Bowser DO    artificial tear (LUBRIFRESH P M ) ophthalmic ointment, , Both Eyes, HS, MAZIN Rosales    ascorbic acid (VITAMIN C) tablet 500 mg, 500 mg, Oral, Daily, MAZIN Ochoa, 500 mg at 11/20/18 0948    carbidopa-levodopa (SINEMET CR)  mg per ER tablet 1 tablet, 1 tablet, Oral, 4x Daily, MAZIN Ochoa, 1 tablet at 11/20/18 2027    carbidopa-levodopa (SINEMET)  mg per tablet 1 5 tablet, 1 5 tablet, Oral, TID, MAZIN Ochoa, 1 5 tablet at 11/20/18 1642    cefTRIAXone (ROCEPHIN) IVPB (premix) 1,000 mg, 1,000 mg, Intravenous, Q24H, Rohini Muhammad MD, Last Rate: 100 mL/hr at 11/20/18 1956, 1,000 mg at 11/20/18 1956    dextrose 5 % infusion, 75 mL/hr, Intravenous, Continuous, Pj Palomino MD    enoxaparin (LOVENOX) subcutaneous injection 30 mg, 30 mg, Subcutaneous, Daily, MAZIN Ochoa, 30 mg at 11/20/18 0950    levothyroxine tablet 50 mcg, 50 mcg, Oral, Early Morning, MAZIN Ochoa, 50 mcg at 11/21/18 0531    melatonin tablet 6 mg, 6 mg, Oral, HS, MAZIN Ochoa, 6 mg at 11/20/18 2118    sodium chloride (OCEAN) 0 65 % nasal spray 1 spray, 1 spray, Each Nare, PRN, Rohini Muhammad MD, 1 spray at 11/20/18 7567    Laboratory Results:    Results from last 7 days  Lab Units 11/21/18  0532 11/20/18  0825 11/20/18  0548 11/19/18  2301 11/19/18  1655   WBC Thousand/uL 5 39 6 66  --   --  7 59   HEMOGLOBIN g/dL 13 0 12 2  --   --  12 6   HEMATOCRIT % 39 6 37 0  --   --  38 6   PLATELETS Thousands/uL 299 245  --   --  277   POTASSIUM mmol/L 3 6  --  3 8 3 7 3 8   CHLORIDE mmol/L 104  --  102 101 95*   CO2 mmol/L 26  --  21 26 28   BUN mg/dL 16  --  16 20 22   CREATININE mg/dL 0 94  --  1 04 1 12 1 24   CALCIUM mg/dL 9 0  --  8 7 9 2 9 6   MAGNESIUM mg/dL  --   --   --   --  2 7* (4) completely dependent

## 2018-11-21 NOTE — CONSULTS
Consult - Urology       Patient: Radha Griffin   : 1933 Sex: female   MRN: [id number]     CSN: [id number]      History of Present Illness   HPI:  Radha Griffin is a 80 y o  female who presents with lethargy tiredness hypo in a tree me a as well as UTI recent CT scan and renal ultrasound confirms left hydronephrosis patient states she has now had for a 5 urinary tract infections over the last year voiding 4 times a day getting up 2-3 times a night        Review of Systems:   Constitutional:  Negative for activity change, fever, chills and diaphoresis  HENT: Negative for hearing loss and trouble swallowing  Eyes: Negative for itching and visual disturbance  Respiratory: Negative for chest tightness and shortness of breath  Cardiovascular: Negative for chest pain, edema  Gastrointestinal: Negative for abdominal distention, na abdominal pain, constipation, diarrhea, Nausea and vomiting  Genitourinary: Negative for decreased urine volume, difficulty urinating, dysuria, enuresis, frequency, hematuria and urgency  Musculoskeletal: Negative for gait problem and myalgias  Neurological: Negative for dizziness and headaches  Hematological: Does not bruise/bleed easily  Historical Information   Past Medical History:   Diagnosis Date    Arthritis     Hypothyroidism     Parkinson's disease ([facility] [address] )     Thyroid disease      History reviewed  No pertinent surgical history    Social History   History   Alcohol Use No     History   Drug Use No     History   Smoking Status    Never Smoker   Smokeless Tobacco    Never Used     Family History:   Family History   Problem Relation Age of Onset    Hypertension Mother     Lung cancer Father        Meds/Allergies   Prescriptions Prior to Admission   Medication    Ascorbic Acid (VITAMIN C PO)    carbidopa-levodopa (SINEMET CR)  mg TBCR per ER tablet    carbidopa-levodopa (SINEMET)  mg per tablet    Cholecalciferol (VITAMIN D PO)  Coenzyme Q10 (COQ10 PO)    Cyanocobalamin (VITAMIN B 12 PO)    progesterone (PROMETRIUM) 200 MG capsule    thyroid desiccated (ARMOUR) 30 mg tablet     No Known Allergies    Objective   Vitals: /82 (BP Location: Right arm)   Pulse 55   Temp 97 8 °F (36 6 °C) (Oral)   Resp 16   Ht 5' 1 5" (1 562 m)   Wt 49 9 kg (110 lb 0 2 oz)   SpO2 97%   BMI 20 45 kg/m²     Physical Exam:  General Alert awake   Normocephalic atraumatic PERRLA  Lungs clear bilaterally  Cardiac normal S1 normal S2  Abdomen soft, flank pain  Vaginal exam deferred  Extremities no edema    I/O last 24 hours: In: 295 [P O :120;  I V :125; IV Piggyback:50]  Out: 100 [Urine:100]    Invasive Devices     Peripheral Intravenous Line            Peripheral IV 11/19/18 Left Antecubital 1 day    Peripheral IV 11/20/18 Left Forearm 1 day                    Lab Results: CBC:   Lab Results   Component Value Date    WBC 5 39 11/21/2018    HGB 13 0 11/21/2018    HCT 39 6 11/21/2018    MCV 92 11/21/2018     11/21/2018    ADJUSTEDWBC 6 60 02/28/2016    MCH 30 2 11/21/2018    MCHC 32 8 11/21/2018    RDW 13 7 11/21/2018    MPV 10 6 11/21/2018    NRBC 0 11/19/2018     CMP:   Lab Results   Component Value Date     06/23/2015     11/21/2018     06/23/2015    CO2 26 11/21/2018    CO2 25 06/23/2015    ANIONGAP 6 06/23/2015    BUN 16 11/21/2018    BUN 22 06/23/2015    CREATININE 0 94 11/21/2018    CREATININE 0 75 06/23/2015    GLUCOSE 99 06/23/2015    CALCIUM 9 0 11/21/2018    CALCIUM 9 2 06/23/2015    AST 34 11/21/2018    ALT 15 11/21/2018    ALKPHOS 291 (H) 11/21/2018    EGFR 55 11/21/2018     Urinalysis:   Lab Results   Component Value Date    COLORU Yellow 11/19/2018    CLARITYU Cloudy 11/19/2018    SPECGRAV 1 020 11/19/2018    PHUR 7 5 11/19/2018    LEUKOCYTESUR Large (A) 11/19/2018    NITRITE Positive (A) 11/19/2018    GLUCOSEU Negative 11/19/2018    KETONESU Trace (A) 11/19/2018    BILIRUBINUR Negative 11/19/2018    BLOODU Trace-Intact (A) 11/19/2018     Urine Culture:   Lab Results   Component Value Date    URINECX >100,000 cfu/ml Gram Negative Miguel Enteric Like (A) 11/19/2018     PSA: No results found for: PSA        Assessment/ Plan:  Chronic UTIs  Left hydronephrosis on spiral CT scan and recent renal ultrasound  Plan schedule CT scan urogram  Continue antibiotics      Adelina Bojorquez MD

## 2018-11-21 NOTE — UTILIZATION REVIEW
Continued Stay Review    WAS OBSERVATION 11/19/18 CONVERTED TO INPATIENT ADMISSION 11/20/18 FOR CONTINUED TREATMENT     Date: 11/20/18   Inpatient Admission (Order [id number])   Admission   Date: 11/20/2018 Department: [address] [place] [address] [address] Released By/Authorizing: MAZIN Allen (auto-released)   Order Information     Order Name   INPATIENT ADMISSION [263]     Inpatient   Date/Time Action Taken User Additional Information   11/20/18 8701 Latricia Dyson (auto-released) From Order: [id number]   11/20/18 1642 Complete MAZIN Allen    Order Details     Frequency Duration Priority Order Class   Once 1  occurrence Routine Hospital Performed   Order Info     Expected Discharge Date:   11/22/18   Inpatient Admission   Order: [id number]   Status:  Completed   Visible to patient:  No (Not Released) Next appt:  11/27/2018 at 03:30 PM in Neurology [place] TAHIRA Rubio)                             Order Questions     Question Answer Comment   Admitting Physician Melani Fajardo [facility]    Level of Care Med Surg    Estimated length of stay More than 2 Midnights    Certification I certify that inpatient services are medically necessary for this patient for a duration of greater than two midnights  See H&P and MD Progress Notes for additional information about the patient's course of treatment          Vital Signs: /82 (BP Location: Right arm)   Pulse 55   Temp 97 8 °F (36 6 °C) (Oral)   Resp 16   Ht 5' 1 5" (1 562 m)   Wt 49 9 kg (110 lb 0 2 oz)   SpO2 97%   BMI 20 45 kg/m²     Medications:   Scheduled Meds:   Current Facility-Administered Medications:  acetaminophen 650 mg Oral Once Anna Anepu, DO    artificial tear  Both Eyes HS MAZIN Rosales    ascorbic acid 500 mg Oral Daily MAZIN Jaramillo    carbidopa-levodopa 1 tablet Oral 4x Daily MAZIN Jaramillo    carbidopa-levodopa 1 5 tablet Oral TID MAZIN Jaramillo    cefTRIAXone 1,000 mg Intravenous Q24H Nona Mcbride MD Last Rate: 1,000 mg (11/20/18 1956)   dextrose 75 mL/hr Intravenous Continuous Zack Rouse MD Last Rate: 75 mL/hr (11/21/18 0827)   enoxaparin 30 mg Subcutaneous Daily Stann Curet, CRNP    levothyroxine 50 mcg Oral Early Morning Stann Curet, CRNP    melatonin 6 mg Oral HS Stann Curet, CRNP    sodium chloride 1 spray Each Nare PRN Nona Mcbride MD      Continuous Infusions:   dextrose 75 mL/hr Last Rate: 75 mL/hr (11/21/18 0827)     PRN Meds: sodium chloride    Abnormal Labs/Diagnostic Results:     Age/Sex: 80 y o  female     Assessment/Plan:   Catalina Garnica  Was called by primary team regarding rapid correction of sodium  Na 127 on adm rapidly improved to 138 in 6 hours after getting IV  ml bolus in ER  No need for fluid restriction  Encourage free water for now  Start IV D5W at 75 ml/hour and check BMP q4 hourly for now       Goal sodium around 133-135 by 5 PM on 11/20/18  Needs close monitoring of UO    80 y o  female with PMhx of parkinson's, hypothyroid, who was admitted to [facility] after presenting with weakness on 11/19  A renal consultation is requested today for assistance in the management of hyponatremia     1) hyponatremia - likely prerenal etiology given rapid improvement with NS     - initially sodium was 127 and rapid correction to 138 with  cc  - changed to D5W overnight which is held and Na this AM is 133   - so, a change of 6 meq over 12 hours  - recheck Na at 11 am pending  - goal Na today 133-135       2) renal function - relatively stable this year, though Cr is higher than last year   Unclear if this is new baseline     - CT imaging with hydro vs cysts  - await renal u/s     3) HTN     - if BP remains above 160 syst, can start amlodipine low dose 2 5 mg and titrate     4) pyuria -      - antibiotics per Primary Team  - f/u final u/s   - f/u final cultures     5) ambulatory dysfunction     - eval in progress per Primary Team     6) elevated alk phos     - as per Primary Team  - could check GGT to start  ATTENDING  Repeat sodium noted to be 138, concern for overcorrection after 500cc NS bolus    Discussed with Dr Stef Alexander from nephrology - will give [email or web address] per hour, encourage oral fluids, monitor mental status and repeat at [facility]     Discharge Plan:

## 2018-11-22 PROBLEM — N10 ACUTE PYELONEPHRITIS: Status: ACTIVE | Noted: 2018-01-01

## 2018-11-22 PROBLEM — N13.30 HYDRONEPHROSIS: Status: ACTIVE | Noted: 2018-01-01

## 2018-11-22 NOTE — PROGRESS NOTES
[facility] Progress Note  Patient: Jackie Pyle 80 y o  female   MRN: [id number]  PCP: Husam Nice  Unit/Bed#: [address] [place] [address] Encounter: [id number]  Date Of Visit: 11/22/18    Problem List:    Principal Problem:    Acute pyelonephritis  Active Problems:    Hyponatremia    Gait abnormality    Elevated alkaline phosphatase level    Parkinson's disease ([address] )    Thyroid disease      Assessment & Plan:    * Acute pyelonephritis   Assessment & Plan    Patient presented with generalized weakness and difficulty in ambulation, noted to have evidence of persistent UTI in ED  · Patient was seen in the ER couple weeks ago and diagnosed with urinary tract infection, treated with Keflex  · Initially started on Rocephin  · Imaging revealed evidence of left-sided hydronephrosis  Seen by Urology, underwent CT nephrogram with evidence of left-sided hydronephrosis with associated pyelonephritis  · Culture reveals E coli, pansensitive  · Bladder scan without any evidence of urinary retention  · Discussed with Urology, plan for left-sided stent placement in  AM  · Will transition to Ancef     Hyponatremia   Assessment & Plan    Sodium 127 on admission with a history of hyponatremia on previous admissions  · Likely secondary to volume depletion likely in setting of UTI  · Improved with IV fluids  · Seen and followed by Nephrology, input appreciated  · Sodium remained stable       Elevated alkaline phosphatase level   Assessment & Plan    Escalating Alk phos level 285>>249>>291 but normal liver biomarkers  She denies abdominal discomfort  · GGTP mildly elevated  · CT abdomen and pelvis without any evidence of hepatic or biliary abnormality  · Follow-up as outpatient after antimicrobial treatment       Gait abnormality   Assessment & Plan    · Patient has difficulty ambulating at baseline and uses a walker     · Worsening at presentation likely secondary to UTI and hyponatremia  · Improving, now close to baseline  · Continue PT/OT     Thyroid disease   Assessment & Plan    The On levothyroxine  TSH normal   · Continue        Parkinson's disease ([address] )   Assessment & Plan    On Sinemet RTC  · Continue sinemet  · Fall precautions  · PT/OT eval             VTE Pharmacologic Prophylaxis:   Pharmacologic: Enoxaparin (Lovenox)  Mechanical VTE Prophylaxis in Place: Yes    Patient Centered Rounds: I have performed bedside rounds with nursing staff today  Discussions with Specialists or Other Care Team Provider: Yes,    Education and Discussions with Family / Patient:Yes, family at bedside    Time Spent for Care: 45 minutes  More than 50% of total time spent on counseling and coordination of care as described above  Current Length of Stay: 2 day(s)    Current Patient Status: Inpatient     Discharge Plan:  Home with family and caregiver, anticipating in next 48-72 hours    Code Status: Level 1 - Full Code    Certification Statement: The patient will continue to require additional inpatient hospital stay due to UTI, hydronephrosis      Subjective:   Denies dysuria, back pain  No new complaints  Ambulating well with rolling walker  Patient family feels that she is improving with strength and back to her baseline    Objective:   Not in distress      Negative for Chest Pain, Palpitations, Shortness of Breath, Abdominal Pain, Nausea, Vomiting, Constipation, Diarrhea, Dizziness  All other 10 review of systems negative and without drastic interval changes from yesterday  Vitals:   Temp (24hrs), Av 8 °F (36 6 °C), Min:97 5 °F (36 4 °C), Max:98 1 °F (36 7 °C)    Temp:  [97 5 °F (36 4 °C)-98 1 °F (36 7 °C)] 98 1 °F (36 7 °C)  HR:  [60-69] 69  Resp:  [16-18] 18  BP: (148-166)/(78-96) 148/78  SpO2:  [96 %-98 %] 96 %  Body mass index is 20 45 kg/m²  Input and Output Summary (last 24 hours):        Intake/Output Summary (Last 24 hours) at 18 1138  Last data filed at 18 0801   Gross per 24 hour   Intake 0 ml   Output              450 ml   Net             -450 ml       Physical Exam:     Physical Exam   Constitutional: She appears well-developed  No distress  HENT:   Head: Normocephalic and atraumatic  Nose: Nose normal    Eyes: Pupils are equal, round, and reactive to light  Conjunctivae are normal    Neck: Normal range of motion  Neck supple  Cardiovascular: Normal rate, regular rhythm and normal heart sounds  Pulmonary/Chest: Effort normal  No respiratory distress  She has decreased breath sounds  She has no wheezes  She has no rhonchi  She has no rales  Abdominal: Soft  Bowel sounds are normal  She exhibits no distension  There is no tenderness  There is no rebound and no guarding  Musculoskeletal: She exhibits no edema  Neurological: She is alert  She is not disoriented  No cranial nerve deficit  GCS eye subscore is 4  GCS verbal subscore is 5  GCS motor subscore is 6  At baseline   Skin: Skin is warm and dry  No rash noted  Additional Data:     Labs:      Results from last 7 days  Lab Units 11/22/18  0647  11/19/18  1655   WBC Thousand/uL 4 70  < > 7 59   HEMOGLOBIN g/dL 12 1  < > 12 6   HEMATOCRIT % 36 7  < > 38 6   PLATELETS Thousands/uL 322  < > 277   NEUTROS PCT %  --   --  68   LYMPHS PCT %  --   --  21   MONOS PCT %  --   --  10   EOS PCT %  --   --  1   < > = values in this interval not displayed  Results from last 7 days  Lab Units 11/22/18  0647   POTASSIUM mmol/L 3 9   CHLORIDE mmol/L 103   CO2 mmol/L 26   BUN mg/dL 17   CREATININE mg/dL 0 88   CALCIUM mg/dL 9 4   ALK PHOS U/L 277*   ALT U/L 16   AST U/L 25           * I Have Reviewed All Lab Data Listed Above  * Additional Pertinent Lab Tests Reviewed: All Labs For Current Hospital Admission Reviewed    Imaging:  Xr Chest 2 Views    Result Date: 10/25/2018  Narrative: CHEST INDICATION:   sob   COMPARISON:  2/27/2017 EXAM PERFORMED/VIEWS:  XR CHEST PA & LATERAL Images: 2 FINDINGS: Cardiomediastinal silhouette appears unremarkable  Moderate-sized hiatal hernia again noted  Minimal left basilar linear atelectasis versus scarring  No lobar consolidation or interstitial edema  No pleural effusions or pneumothorax  Osseous structures appear within normal limits for patient age  Impression: No acute cardiopulmonary disease  Workstation performed: [id number]     Xr Spine Lumbar 2 Or 3 Views Injury    Result Date: 11/19/2018  Narrative: LUMBAR SPINE INDICATION:   Increased back pain with new onset right groin pain  Generalized weakness started yesterday  COMPARISON:  None VIEWS:  XR SPINE LUMBAR 2 OR 3 VIEWS INJURY Images: 3 FINDINGS: Study is somewhat limited by patient positioning and rotation on lateral view  There is no evidence of acute fracture or destructive osseous lesion  Mild dextroscoliosis  Multilevel degenerative disc disease noted most pronounced at L3-4 and L5-S1  Multifocal marginal endplate osteophytes  The pedicles appear intact  There are atherosclerotic calcifications  Soft tissues are otherwise unremarkable  Impression: Somewhat limited study  No acute osseous abnormality  Mild dextroscoliosis with moderately advanced multilevel degenerative changes  Workstation performed: [id number]     Ct Head Without Contrast    Result Date: 11/19/2018  Narrative: CT BRAIN - WITHOUT CONTRAST INDICATION:   HTN, weakness  COMPARISON:  CT head 2/23/2017 TECHNIQUE:  CT examination of the brain was performed  In addition to axial images, coronal 2D reformatted images were created and submitted for interpretation  Radiation dose length product (DLP) for this visit:  1012 37 mGy-cm   This examination, like all CT scans performed in the [facility], was performed utilizing techniques to minimize radiation dose exposure, including the use of iterative reconstruction and automated exposure control  IMAGE QUALITY:  Diagnostic   FINDINGS: PARENCHYMA: Decreased attenuation is noted in periventricular and subcortical white matter demonstrating an appearance that is statistically most likely to represent advanced microangiopathic change; this appearance is similar when compared to most recent prior examination  Chronic lacunar infarction(s) are noted in basal ganglia, unchanged from prior exam  No CT signs of acute infarction  No intracranial mass, mass effect or midline shift  No acute parenchymal hemorrhage  VENTRICLES AND EXTRA-AXIAL SPACES:  Enlargement of ventricles and extra-axial CSF spaces, out of proportion to the patient's age most consistent with cerebral and cerebellar atrophy  VISUALIZED ORBITS AND PARANASAL SINUSES:  No acute abnormality involving the orbits  Mild scattered sinus mucosal thickening is noted  No fluid levels are seen  CALVARIUM AND EXTRACRANIAL SOFT TISSUES:  Normal      Impression: No acute intracranial abnormality  Stable examination  Workstation performed: [id number]     Ct Renal Protocol    Result Date: 11/21/2018  Narrative: CT ABDOMEN AND PELVIS WITH AND WITHOUT IV CONTRAST INDICATION: Follow-up suspected left cirrhosis  COMPARISON:  Nonenhanced CT 11/19/2018; renal ultrasound 11/20/2018; pelvic ultrasound 5/20/2016  TECHNIQUE: Initial CT of the abdomen and pelvis was performed without intravenous contrast   Subsequent dynamic CT evaluation of the abdomen and pelvis was performed after the administration of intravenous contrast in both nephrographic and delayed phases after the administration of intravenous contrast   Axial, sagittal, and coronal 2D reformatted images were created from the source data and submitted for interpretation  Radiation dose length product (DLP) for this visit:  921 73 mGy-cm   This examination, like all CT scans performed in the [facility], was performed utilizing techniques to minimize radiation dose exposure, including the use of iterative  reconstruction and automated exposure control   IV Contrast:  100 mL of iohexol (OMNIPAQUE) Enteric Contrast:  Enteric contrast was not administered  FINDINGS: ABDOMEN RIGHT KIDNEY AND URETER: No solid renal mass  No detectable urothelial mass  No hydronephrosis or hydroureter  No urinary tract calculi  No perinephric collection  There is however scattered patchy enhancement of the cortex concerning for pyelonephritis  LEFT KIDNEY AND URETER: No solid renal mass  No detectable urothelial mass  Moderate hydronephrosis and proximal hydroureter with collapse of the distal ureter  Enhancement of the pelvis and ureter noted, likely infectious  No urinary tract calculi  No perinephric collection  Patchy enhancement of the cortex is similar to the right kidney  URINARY BLADDER: No bladder wall mass  No calculi  LOWER CHEST:  Trace pleural effusions with enhancing bibasilar atelectasis  Moderate-sized hiatal hernia also noted  LIVER/BILIARY TREE:  Unremarkable  GALLBLADDER:  No calcified gallstones  No pericholecystic inflammatory change  SPLEEN:  Unremarkable  PANCREAS:  Unremarkable  ADRENAL GLANDS:  Unremarkable  STOMACH AND BOWEL:  Abundant fecal debris in the rectum concerning for infection  No bowel obstruction  ABDOMINOPELVIC CAVITY:  No ascites  No free intraperitoneal air  No lymphadenopathy  VESSELS:  Atherosclerotic plaque throughout the normal caliber abdominal aorta as well as  PELVIS REPRODUCTIVE ORGANS:  Myomatous uterus again noted including a dominant enhancing left subserosal myoma, stable since the pelvic ultrasound  Unremarkable adnexa  A pessary is in place  APPENDIX: No findings to suggest appendicitis  ABDOMINAL WALL/INGUINAL REGIONS:  Unremarkable  OSSEOUS STRUCTURES:  No acute fracture or destructive osseous lesion  Multilevel degenerative disc disease of the thoracolumbar spine again noted  Impression: 1  Moderate left hydronephrosis and proximal ureterectasis with no obstructive calculi and/or ureteral mass    Enhancement of the renal pelvis and ureter concerning for pyelitis  2   Patchy bilateral cortical enhancement concerning for pyelonephritis  No perinephric collections  3   Myomatous uterus  4   Trace pleural effusions with bibasilar atelectasis 5  Hiatal hernia  The study was marked in [facility] for immediate notification  Workstation performed: [id number]     Ct Renal Stone Study Abdomen Pelvis Wo Contrast    Result Date: 11/19/2018  Narrative: CT ABDOMEN AND PELVIS WITHOUT IV CONTRAST - LOW DOSE RENAL STONE INDICATION:   abdominal pain  COMPARISON:  None  TECHNIQUE:  Low dose thin section CT examination of the abdomen and pelvis was performed without intravenous or oral contrast according to a protocol specifically designed to evaluate for urinary tract calculus  Axial, sagittal, and coronal 2D reformatted images were created from the source data and submitted for interpretation  Evaluation for pathology in the abdomen and pelvis that is unrelated to urinary tract calculi is limited  Radiation dose length product (DLP) for this visit:  174 64 mGy-cm   This examination, like all CT scans performed in the [facility], was performed utilizing techniques to minimize radiation dose exposure, including the use of iterative  reconstruction and automated exposure control  FINDINGS: RIGHT KIDNEY AND URETER: No radiopaque urinary tract calculi  No hydronephrosis or hydroureter  LEFT KIDNEY AND URETER: Probable moderate left hydronephrosis versus multiple parapelvic cysts  Further evaluation severely degraded by motion artifact  No radiopaque urinary tract calculi  URINARY BLADDER: Unremarkable  Bibasilar atelectasis versus scarring in the dependent lower lobes  Limited low radiation dose noncontrast CT evaluation demonstrates no clinically significant abnormality of liver, spleen, pancreas, or adrenal glands  No calcified gallstones or gallbladder wall thickening noted  No ascites or bulky lymphadenopathy on this limited noncontrast study  Moderate fecal stasis  No bowel obstruction  Limited evaluation demonstrates no evidence to suggest acute appendicitis  No acute fracture or destructive osseous lesion is identified  Spinal degenerative changes are noted  Uterus has a lobulated contour with multiple calcified fibroids  Pessary ring in place  Impression: 1  Probable moderate left hydronephrosis versus multiple parapelvic cysts  Renal ultrasound is recommended for further evaluation  2   Fibroid uterus  I personally discussed this study with Darrian Langford on 11/19/2018 at 7:25 PM  Workstation performed: [id number]     Us Kidney And Bladder    Result Date: 11/20/2018  Narrative: RENAL ULTRASOUND INDICATION:   PYELONEPHRITIS, UNCOMPLICATED pyelonephritis  COMPARISON: CT scan on 11/19/2018 TECHNIQUE:   Ultrasound of the retroperitoneum was performed with a curvilinear transducer utilizing volumetric sweeps and still imaging techniques  Study limited by overlying bowel gas  FINDINGS: KIDNEYS: Symmetric and normal size  Right kidney:  8 9 x 3 4 cm  The renal cortex measures 1 1 cm  Left kidney:  11 1 x 5 3 cm  The renal cortex measures 1 4 cm  Right kidney Normal echogenicity and contour  No suspicious masses detected  No hydronephrosis  No shadowing calculi  No perinephric fluid collections  Left kidney Normal echogenicity and contour  No suspicious masses detected  Moderate hydronephrosis  No shadowing calculi  No perinephric fluid collections  URETERS: Nonvisualized  BLADDER: Normally distended  No focal thickening or mass lesions  Bilateral ureteral jets not demonstrated  Impression: Normal right kidney  Moderate left hydronephrosis  Workstation performed: [id number]     Cta Ed Chest Pe Study    Result Date: 10/25/2018  Narrative: CTA - CHEST WITH IV CONTRAST - PULMONARY ANGIOGRAM INDICATION:   dyspnea  "Patient here with abrupt onset of dyspnea since this morning  Occurred shortly after taking medication" COMPARISON: None   TECHNIQUE: CTA examination of the chest was performed using angiographic technique according to a protocol specifically tailored to evaluate for pulmonary embolism  Axial, sagittal, and coronal 2D reformatted images were created from the source data and  submitted for interpretation  In addition, coronal 3D MIP postprocessing was performed on the acquisition scanner  Radiation dose length product (DLP) for this visit:  368 85 mGy-cm   This examination, like all CT scans performed in the [facility], was performed utilizing techniques to minimize radiation dose exposure, including the use of iterative  reconstruction and automated exposure control  IV Contrast:  85 mL of iohexol (OMNIPAQUE)  FINDINGS: PULMONARY ARTERIAL TREE:  No pulmonary embolus is seen  LUNGS:  Mild scarring or atelectasis at the lung bases  PLEURA:  Unremarkable  HEART/GREAT VESSELS:  Unremarkable for patient's age  MEDIASTINUM AND ALEXY:  Moderate hiatal hernia  CHEST WALL AND LOWER NECK:   Unremarkable  VISUALIZED STRUCTURES IN THE UPPER ABDOMEN:  Unremarkable  OSSEOUS STRUCTURES:  No acute fracture or destructive osseous lesion  Impression: No acute findings specifically, no pulmonary arterial embolism or pulmonary consolidation  Moderate sliding-type hiatal hernia  Workstation performed: [id number]     Imaging Reports Reviewed by myself    Cultures:   Blood Culture:   Lab Results   Component Value Date    BLOODCX No Growth After 5 Days  02/23/2017    BLOODCX No Growth After 5 Days  02/23/2017     Urine Culture:   Lab Results   Component Value Date    URINECX >100,000 cfu/ml Escherichia coli (A) 11/19/2018    URINECX >100,000 cfu/ml Escherichia coli 02/23/2017    URINECX Culture results to follow   02/26/2016    URINECX 50,000-59,000 cfu/ml Klebsiella pneumoniae 02/26/2016     Sputum Culture: No components found for: SPUTUMCX  Wound Culture: No results found for: WOUNDCULT    Last 24 Hours Medication List:     Current Facility-Administered Medications:  acetaminophen 650 mg Oral Q6H PRN Aric To MD   artificial tear  Both Eyes HS Haley S Dumas, CRNP   ascorbic acid 500 mg Oral Daily Khadar Cap, CRNP   azelastine 1 spray Each Nare BID Aric To MD   carbidopa-levodopa 1 tablet Oral 4x Daily Khadar Cap, CRNP   carbidopa-levodopa 1 5 tablet Oral TID Khadar Cap, CRNP   cefazolin 1,000 mg Intravenous Q8H Aric To MD   enoxaparin 30 mg Subcutaneous Daily Khadar Cap, CRNP   levothyroxine 50 mcg Oral Early Morning Khadar Cap, CRNP   melatonin 6 mg Oral HS Khadar Cap, CRNP   sodium chloride 1 spray Each Nare PRN Aric To MD        Today, Patient Was Seen By: Aric To MD    ** Please Note: "This note has been constructed using a voice recognition system  Therefore there may be syntax, spelling, and/or grammatical errors   Please call if you have any questions  "**

## 2018-11-22 NOTE — PLAN OF CARE
DISCHARGE PLANNING     Discharge to home or other facility with appropriate resources Progressing        DISCHARGE PLANNING - CARE MANAGEMENT     Discharge to post-acute care or home with appropriate resources Progressing        GENITOURINARY - ADULT     Maintains or returns to baseline urinary function Progressing     Absence of urinary retention Progressing        INFECTION - ADULT     Absence or prevention of progression during hospitalization Progressing        Knowledge Deficit     Patient/family/caregiver demonstrates understanding of disease process, treatment plan, medications, and discharge instructions Progressing        METABOLIC, FLUID AND ELECTROLYTES - ADULT     Electrolytes maintained within normal limits Progressing     Fluid balance maintained Progressing        Potential for Falls     Patient will remain free of falls Progressing        Prexisting or High Potential for Compromised Skin Integrity     Skin integrity is maintained or improved Progressing        SAFETY ADULT     Maintain or return to baseline ADL function Progressing     Maintain or return mobility status to optimal level Progressing

## 2018-11-22 NOTE — ASSESSMENT & PLAN NOTE
Patient presented with generalized weakness and difficulty in ambulation, noted to have evidence of persistent UTI in ED  Patient was seen in the ER couple weeks ago and diagnosed with urinary tract infection, treated with Keflex  Initially started on Rocephin  · Imaging revealed evidence of left-sided hydronephrosis  PVR acceptable  Seen by Urology  Appreciate input  Underwent CT nephrogram with evidence of left-sided hydronephrosis with associated pyelonephritis  Plan for  left-sided stent placement today     · Culture reveals E coli, pansensitive transitioned to Cefazolin day 2

## 2018-11-22 NOTE — ASSESSMENT & PLAN NOTE
Escalating Alk phos level 285>>249>>291>>277 but normal liver biomarkers   She denies abdominal discomfort  · GGTP mildly elevated  · CT abdomen and pelvis without any evidence of hepatic or biliary abnormality  · Follow-up as outpatient after antimicrobial treatment

## 2018-11-22 NOTE — PHYSICAL THERAPY NOTE
PT TREATMENT     11/22/18 1035   Pain Assessment   Pain Assessment No/denies pain   Restrictions/Precautions   Other Precautions Fall Risk;Bed Alarm; Chair Alarm   General   Chart Reviewed Yes   Cognition   Arousal/Participation Cooperative   Attention Attends with cues to redirect   Following Commands Follows one step commands without difficulty   Subjective   Subjective agreeable to walking, acknowledges feeling better    Bed Mobility   Supine to Sit 4  Minimal assistance   Transfers   Sit to Stand 4  Minimal assistance   Stand to Sit 4  Minimal assistance   Stand pivot 4  Minimal assistance   Additional items (with walker)   Ambulation/Elevation   Gait pattern Narrow GRETA  (kyphotic posture, short step length)   Gait Assistance 4  Minimal assist   Assistive Device 4-wheeled walker   Distance 150 feet   Exercises   Balance training  Pt stood in the bathroom at the sink x 15 minutes to brush teeth, wash face, wash hands with min assist   Assessment   Prognosis Good   Problem List Decreased strength;Decreased endurance; Impaired balance;Decreased mobility   Assessment Pt demonstrates improving balance, gait, endurance and functional mobility  Pt was fatigued at the end of the session  Goals   Treatment Day 2   Plan   Treatment/Interventions ADL retraining;Functional transfer training;LE strengthening/ROM; Therapeutic exercise;Gait training;Bed mobility; Equipment eval/education;Patient/family training; Endurance training   Progress Progressing toward goals   PT Frequency 5x/wk   Recommendation   Recommendation Home PT;24 hour supervision/assist   Equipment Recommended (pt has a walker)   Licensure   [place] License Number  Ata Garcia PT  [id number]

## 2018-11-22 NOTE — ASSESSMENT & PLAN NOTE
Patient has difficulty ambulating at baseline and uses a walker     · Worsening at presentation likely secondary to UTI and hyponatremia  · Improving, now close to baseline  · Continue PT/OT

## 2018-11-22 NOTE — PROGRESS NOTES
Progress note Urology       Patient: Cheyenne Young   : 1933 Sex: female   MRN: [id number]     CSN: [id number]      Subjective  No complaints        Review of Systems:   Constitutional:  Negative for activity change, fever, chills and diaphoresis  HENT: Negative for hearing loss and trouble swallowing  Eyes: Negative for itching and visual disturbance  Respiratory: Negative for chest tightness and shortness of breath  Cardiovascular: Negative for chest pain, edema  Gastrointestinal: Negative for abdominal distention, na abdominal pain, constipation, diarrhea, Nausea and vomiting  Genitourinary: Negative for decreased urine volume, difficulty urinating, dysuria, enuresis, frequency, hematuria and urgency  Musculoskeletal: Negative for gait problem and myalgias  Neurological: Negative for dizziness and headaches  Hematological: Does not bruise/bleed easily  Historical Information   Past Medical History:   Diagnosis Date    Arthritis     Hypothyroidism     [facility]     Thyroid disease      History reviewed  No pertinent surgical history    Social History   History   Alcohol Use No     History   Drug Use No     History   Smoking Status    Never Smoker   Smokeless Tobacco    Never Used     Family History:   Family History   Problem Relation Age of Onset    Hypertension Mother     Lung cancer Father        Meds/Allergies   Prescriptions Prior to Admission   Medication    Ascorbic Acid (VITAMIN C PO)    carbidopa-levodopa (SINEMET CR)  mg TBCR per ER tablet    carbidopa-levodopa (SINEMET)  mg per tablet    Cholecalciferol (VITAMIN D PO)    Coenzyme Q10 (COQ10 PO)    Cyanocobalamin (VITAMIN B 12 PO)    progesterone (PROMETRIUM) 200 MG capsule    thyroid desiccated (ARMOUR) 30 mg tablet     No Known Allergies    Objective   Vitals: /78 (BP Location: Left arm)   Pulse 69   Temp 98 1 °F (36 7 °C) (Oral)   Resp 18   Ht 5' 1 5" (1 562 m) Wt 49 9 kg (110 lb 0 2 oz)   SpO2 96%   BMI 20 45 kg/m²     Physical Exam:  General Alert awake   Normocephalic atraumatic PERRLA  Lungs clear bilaterally  Cardiac normal S1 normal S2  Abdomen soft, flank pain  Extremities no edema    I/O last 24 hours:   In: -   Out: 475 [Urine:475]    Invasive Devices     Peripheral Intravenous Line            Peripheral IV 11/20/18 Left Forearm 2 days                    Lab Results: CBC:   Lab Results   Component Value Date    WBC 4 70 11/22/2018    HGB 12 1 11/22/2018    HCT 36 7 11/22/2018    MCV 90 11/22/2018     11/22/2018    ADJUSTEDWBC 6 60 02/28/2016    MCH 29 5 11/22/2018    MCHC 33 0 11/22/2018    RDW 13 4 11/22/2018    MPV 10 2 11/22/2018    NRBC 0 11/19/2018     CMP:   Lab Results   Component Value Date     06/23/2015     11/22/2018     06/23/2015    CO2 26 11/22/2018    CO2 25 06/23/2015    ANIONGAP 6 06/23/2015    BUN 17 11/22/2018    BUN 22 06/23/2015    CREATININE 0 88 11/22/2018    CREATININE 0 75 06/23/2015    GLUCOSE 99 06/23/2015    CALCIUM 9 4 11/22/2018    CALCIUM 9 2 06/23/2015    AST 25 11/22/2018    ALT 16 11/22/2018    ALKPHOS 277 (H) 11/22/2018    EGFR 60 11/22/2018     Urinalysis:   Lab Results   Component Value Date    COLORU Yellow 11/19/2018    CLARITYU Cloudy 11/19/2018    SPECGRAV 1 020 11/19/2018    PHUR 7 5 11/19/2018    LEUKOCYTESUR Large (A) 11/19/2018    NITRITE Positive (A) 11/19/2018    GLUCOSEU Negative 11/19/2018    KETONESU Trace (A) 11/19/2018    BILIRUBINUR Negative 11/19/2018    BLOODU Trace-Intact (A) 11/19/2018     Urine Culture:   Lab Results   Component Value Date    URINECX >100,000 cfu/ml Escherichia coli (A) 11/19/2018     PSA: No results found for: PSA        Assessment/ Plan:  Left hydronephrosis  uti  Schedule cysto/left stent tomorrow      João Christine MD

## 2018-11-22 NOTE — PLAN OF CARE
Problem: PHYSICAL THERAPY ADULT  Goal: Performs mobility at highest level of function for planned discharge setting  See evaluation for individualized goals  Outcome: Progressing  Prognosis: Good  Problem List: Decreased strength, Decreased endurance, Impaired balance, Decreased mobility  Assessment: Pt demonstrates improving balance, gait, endurance and functional mobility  Pt was fatigued at the end of the session  Recommendation: Home PT, 24 hour supervision/assist          See flowsheet documentation for full assessment

## 2018-11-23 NOTE — ASSESSMENT & PLAN NOTE
Patient was seen in the ER couple weeks ago and diagnosed with urinary tract infection, treated with Keflex  Urine consistent with UTI and initially started on Rocephin  · Imaging revealed evidence of left-sided hydronephrosis  PVR no record of urinary retention  Seen by Urology  Appreciate input  Underwent CT nephrogram with evidence of left-sided hydronephrosis with associated pyelonephritis  · S/P left-sided stent placement today by Dr Mariama Garcia and pt tolerated procedure well  Patient to follow up with Dr Mariama Garcia in 3 weeks  Advised to call his office or go to the ED for any worsening urinary frequency, painful urination, lower back pain, and bleeding  Pyridium to take every 8 hours as needed for urinary discomfort informed that it will make her urine orange  · Urine culture reveals E coli, pansensitive transitioned to Cefazolin IV  Patient to take Duricef x5 more days

## 2018-11-23 NOTE — ASSESSMENT & PLAN NOTE
Escalating Alk phos level 285>>249>>291>>277 but normal liver biomarkers  She denies abdominal discomfort, however patient is a poor historian  GGTP mildly elevated  Her CT abdomen and pelvis without any evidence of hepatic or biliary abnormality  · Follow-up as outpatient after antimicrobial treatment    · CMP in 1 week and results to her PMD

## 2018-11-23 NOTE — NURSING NOTE
Iv removed-pt  Tolerated well  AVS reviewed with patient, , and son- and son expressed verbal understanding-time given to ask questions  Patient left with all of her personal belongings and script for lab  Patient left the floor via wheelchair accompanied by her  and patient care marilu Pereyra

## 2018-11-23 NOTE — ASSESSMENT & PLAN NOTE
Sodium 127 on admission with a history of hyponatremia on previous admissions likely secondary to volume depletion likely in setting of UTI  This was improved with IV fluids and followed by Nephrology, input appreciated    · Sodium remained stable on d/c   · CMP in a week and result to her PMD

## 2018-11-23 NOTE — DISCHARGE INSTRUCTIONS
· Please follow up as directed  · Please call Dr Rose Mao office or go to the nearest ED for worsening urinary frequency, painful urination, fever/chills, bloody urine, even change in her mentation  · Please take antibiotic as directed  · Pyridium is only as needed and this will turn your urine orange  · Please continue PT as out patient to get her strength back  · CBC and CMP to be done in a week as an outpatient and follow up results to your primary doctor  Urinary Tract Infection in Women   WHAT YOU NEED TO KNOW:   A urinary tract infection (UTI) is caused by bacteria that get inside your urinary tract  Most bacteria that enter your urinary tract come out when you urinate  If the bacteria stay in your urinary tract, you may get an infection  Your urinary tract includes your kidneys, ureters, bladder, and urethra  Urine is made in your kidneys, and it flows from the ureters to the bladder  Urine leaves the bladder through the urethra  A UTI is more common in your lower urinary tract, which includes your bladder and urethra  DISCHARGE INSTRUCTIONS:   Seek care immediately if:   · You are urinating very little or not at all  · You have a high fever with shaking chills  · You have side or back pain that gets worse  Contact your healthcare provider if:   · You have a fever  · You do not feel better after 2 days of taking antibiotics  · You are vomiting  · You have questions or concerns about your condition or care  Medicines:   · Antibiotics  help fight a bacterial infection  · Medicines  may be given to decrease pain and burning when you urinate  They will also help decrease the feeling that you need to urinate often  These medicines will make your urine orange or red  · Take your medicine as directed  Contact your healthcare provider if you think your medicine is not helping or if you have side effects  Tell him or her if you are allergic to any medicine   Keep a list of the medicines, vitamins, and herbs you take  Include the amounts, and when and why you take them  Bring the list or the pill bottles to follow-up visits  Carry your medicine list with you in case of an emergency  Follow up with your healthcare provider as directed:  Write down your questions so you remember to ask them during your visits  Prevent another UTI:   · Empty your bladder often  Urinate and empty your bladder as soon as you feel the need  Do not hold your urine for long periods of time  · Wipe from front to back after you urinate or have a bowel movement  This will help prevent germs from getting into your urinary tract through your urethra  · Drink liquids as directed  Ask how much liquid to drink each day and which liquids are best for you  You may need to drink more liquids than usual to help flush out the bacteria  Do not drink alcohol, caffeine, or citrus juices  These can irritate your bladder and increase your symptoms  Your healthcare provider may recommend cranberry juice to help prevent a UTI  · Urinate after you have sex  This can help flush out bacteria passed during sex  · Do not douche or use feminine deodorants  These can change the chemical balance in your vagina  · Change sanitary pads or tampons often  This will help prevent germs from getting into your urinary tract  · Do pelvic muscle exercises often  Pelvic muscle exercises may help you start and stop urinating  Strong pelvic muscles may help you empty your bladder easier  Squeeze these muscles tightly for 5 seconds like you are trying to hold back urine  Then relax for 5 seconds  Gradually work up to squeezing for 10 seconds  Do 3 sets of 15 repetitions a day, or as directed  © 2017 [employer] Information is for End User's use only and may not be sold, redistributed or otherwise used for commercial purposes   All illustrations and images included in CareNotes® are the copyrighted property of [employer]  or [employer]  The above information is an  only  It is not intended as medical advice for individual conditions or treatments  Talk to your doctor, nurse or pharmacist before following any medical regimen to see if it is safe and effective for you  Cystoscopy   WHAT YOU NEED TO KNOW:   A cystoscopy is a procedure to look inside of your urethra and bladder using a cystoscope  A cystoscope is a small tube with a light and magnifying camera on the end  The procedure is used to diagnose and treat conditions of the bladder, urethra, and prostate  The procedure is also done to remove stones or blood clots from the urethra or bladder  Your healthcare provider may do other tests, such as ureteroscopy, during a cystoscopy  DISCHARGE INSTRUCTIONS:   Call 911 if:   · You suddenly have chest pain or trouble breathing  Seek care immediately if:   · Your urine turns from pink to red, or you have clots in your urine  · You cannot urinate and your bladder feels full  · Your pain or burning becomes worse or lasts longer than 2 days  Contact your healthcare provider or urologist if:   · Your urine stays pink for longer than 3 days  · You urinate less than normal, or still feel like you have to urinate after you use the bathroom  · Your skin is itchy, swollen, or has a new rash  · You have a fever and chills  · You have questions or concerns about your condition or care  Medicines: You may  be given any of the following:  · Antibiotics  help treat or prevent a bacterial infection  · Acetaminophen  decreases pain and fever  It is available without a doctor's order  Ask how much to take and how often to take it  Follow directions  Read the labels of all other medicines you are using to see if they also contain acetaminophen, or ask your doctor or pharmacist  Acetaminophen can cause liver damage if not taken correctly   Do not use more than 4 grams (4,000 milligrams) total of acetaminophen in one day  · Take your medicine as directed  Contact your healthcare provider if you think your medicine is not helping or if you have side effects  Tell him or her if you are allergic to any medicine  Keep a list of the medicines, vitamins, and herbs you take  Include the amounts, and when and why you take them  Bring the list or the pill bottles to follow-up visits  Carry your medicine list with you in case of an emergency  Follow up with your healthcare provider as directed: You may need to have another cystoscopy  Write down your questions so you remember to ask them during your visits  Self-care:   · Drink at least 3 to 4 glasses of water daily for 2 days after your procedure  Do not drink acidic juices such as orange juice and lemonade  Drink water to help prevent blood clots from forming  It can also help decrease the amount of acid in your urine  Acid in your urine may increase the burning feeling when you urinate  · Sit in a warm tub of water  Warm water may relieve pain and bladder spasms  · Do not have sex  until your healthcare provider tells you it is okay  Sex may increase your risk for a urinary tract infection  © 2017 [employer]  Information is for End User's use only and may not be sold, redistributed or otherwise used for commercial purposes  All illustrations and images included in CareNotes® are the copyrighted property of [employer]  or [employer]  The above information is an  only  It is not intended as medical advice for individual conditions or treatments  Talk to your doctor, nurse or pharmacist before following any medical regimen to see if it is safe and effective for you

## 2018-11-23 NOTE — OCCUPATIONAL THERAPY NOTE
Occupational Therapy    Attempted to see pt for OT services  Pt asleep,  at bedside  Pt woke easily but declined services at this time  OT will follow as appropriate      Sergey Carmona OTR/L [id number]

## 2018-11-23 NOTE — ASSESSMENT & PLAN NOTE
Patient has difficulty ambulating at baseline and uses a walker  Worsening at presentation likely secondary to UTI and hyponatremia  Improved and now close to baseline  · Continue PT/OT on d/c 3x/week

## 2018-11-23 NOTE — OP NOTE
OPERATIVE REPORT  PATIENT NAME: Leela Llamas    :  1933  MRN: [id number]  Pt Location: [facility] OR ROOM 04    SURGERY DATE: 2018    Surgeon(s) and Role:     * Johnathon Rainey MD - Primary    Preop Diagnosis:  Chronic UTIs  Left hydronephrosis    Post-Op Diagnosis Codes:   Chronic UTIs  Left hydronephrosis  Left UPJ stenosis  Left distal ureteral stricture    Procedure cysto left retrograde pyelogram left ureteroscopy with balloon dilation of a left distal ureteral stricture placement of a 24 cm 7 Algerian stent    Specimen(s):  * No specimens in log *    Estimated Blood Loss:   Minimal    Drains:  Ureteral Drain/Stent Left ureter 24 Fr  (Active)   Number of days: 0       Anesthesia Type:   IV Sedation with Anesthesia    Operative Indications: This 70-year-old female was met on consultation at [address] [place] [address] earlier in the week with history of chronic UTIs and left hydronephrosis patient now to undergo evaluation after 5 days of appropriate antibiotics    Operative Findings:  1  Left distal ureteral stricture ureteral orifice stenosis balloon dilated  2  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  3  24 cm 7 Algerian stent passed     Complications:   None    Procedure and Technique:  After the patient identified in holding area with  and caregiver consent signed by both her and her  left side marked she was placed in the op suite after anesthesia was induced she was placed thigh position draped prep standard fashion time-out performed 25 Algerian cystoscope with 30 degree lens passed through through the bladder left orifice was found with some difficulty to be quite stenotic attempts initially passing a 0 038 wire was unsuccessful finally with the help of 5 [place] Bess Angiocath a 0 038 wire was passed up through the stenotic office 5 Algerian catheter placed into the orifice    The wire was removed retrograde pyelogram confirmed hydronephrosis distal to the catheter indicative of left obstructing distal ureter as well as a left UPJ stenosis due to crossing vessel hydro calyx the wire was then fed up the 5 [place] catheter and with some difficulty through the stenotic UPJ and into the renal pelvis catheter removed wire left a safety the semi rigid scope then passed and with the help of a 025 wire the orifice was entered with stenosis stricture noted  This was passively dilated with the scope and ureteroscopy performed including the distal mid proximal ureter to the proximal ureter to ordered laterally and upward indicative of crossing vessel the scope was removed the distal ureter still appeared to be stenotic despite passive dilation with the scope over the safety wire a balloon dilator was placed in the distal ureter was balloon dilated     The balloon taken down and out over the wire a 24/7 [place] Bess stent was passed wire removed postop procedure was awakened taken recovery room stable condition will have stent removed in 3 weeks and followed closely in light of her proximal UPJ stenosis   I was present for the entire procedure    Patient Disposition:  PACU     SIGNATURE: Johnathon Rainey MD  DATE: November 23, 2018  TIME: 1:02 PM

## 2018-11-23 NOTE — ANESTHESIA PREPROCEDURE EVALUATION
Review of Systems/Medical History  Patient summary reviewed  Chart reviewed  No history of anesthetic complications     Cardiovascular  Dysrhythmias (PAF) , atrial fibrillation,    Pulmonary       GI/Hepatic       Kidney stones,        Endo/Other  History of thyroid disease , hypothyroidism,      GYN       Hematology   Musculoskeletal    Arthritis     Neurology    Neuromuscular disease (parkinson's disease) ,    Psychology           Physical Exam    Airway    Mallampati score: II  TM Distance: >3 FB  Neck ROM: full     Dental       Cardiovascular  Rhythm: regular, Rate: normal,     Pulmonary  Breath sounds clear to auscultation,     Other Findings        Anesthesia Plan  ASA Score- 3     Anesthesia Type- general with ASA Monitors  Additional Monitors:   Airway Plan: LMA  Plan Factors-    Induction- intravenous  Postoperative Plan- Plan for postoperative opioid use  Planned trial extubation    Informed Consent- Anesthetic plan and risks discussed with patient

## 2018-11-23 NOTE — ASSESSMENT & PLAN NOTE
On Sinemet RTC  · Continue Sinemet as previously directed  · Patient with 2 caregivers at home instructed and aware of fall precautions    · Continue PT/OT eval   Patient goes to [place] 3 times a week

## 2018-11-23 NOTE — DISCHARGE SUMMARY
Discharge- Marcos Brady 1933, 80 y o  female MRN: [id number]    Unit/Bed#: [address] [place] [address] Encounter: [id number]    Primary Care Provider: Lauri Hooks   Date and time admitted to hospital: 11/19/2018  3:56 PM        * Acute pyelonephritis   Assessment & Plan    Patient presented with generalized weakness and difficulty in ambulation, noted to have evidence of persistent UTI in ED  Patient was seen in the ER couple weeks ago and diagnosed with urinary tract infection, treated with Keflex  Initially started on Rocephin  · Imaging revealed evidence of left-sided hydronephrosis  PVR no record of urinary retention  Seen by Urology  Appreciate input  Underwent CT nephrogram with evidence of left-sided hydronephrosis with associated pyelonephritis  · She underwent for left-sided stent placement today by Dr Rebecca Bundy and pt tolerated procedure well  Patient to follow up with Dr Rebecca Bundy in 3 weeks  Advised to call his office or go to the ED for any worsening urinary frequency, painful urination, lower back pain, and bleeding  Pyridium to take every 8 hours as needed for urinary discomfort informed that it will make her urine orange  · Urine culture reveals E coli, pansensitive transitioned to Cefazolin day 2  Patient to take Keflex x5 more days  Hyponatremia   Assessment & Plan    Sodium 127 on admission with a history of hyponatremia on previous admissions likely secondary to volume depletion likely in setting of UTI  This was improved with IV fluids and followed by Nephrology, input appreciated  · Sodium remained stable on d/c   · CMP in a week and result to her PMD        Hydronephrosis   Assessment & Plan    Patient was seen in the ER couple weeks ago and diagnosed with urinary tract infection, treated with Keflex  Urine consistent with UTI and initially started on Rocephin    · Imaging revealed evidence of left-sided hydronephrosis  PVR no record of urinary retention  Seen by Urology  Appreciate input  Underwent CT nephrogram with evidence of left-sided hydronephrosis with associated pyelonephritis  · S/P left-sided stent placement today by Dr Hoa Meyer and pt tolerated procedure well  Patient to follow up with Dr Hoa Meyer in 3 weeks  Advised to call his office or go to the ED for any worsening urinary frequency, painful urination, lower back pain, and bleeding  Pyridium to take every 8 hours as needed for urinary discomfort informed that it will make her urine orange  · Urine culture reveals E coli, pansensitive transitioned to Cefazolin IV  Patient to take Duricef x5 more days  Elevated alkaline phosphatase level   Assessment & Plan    Escalating Alk phos level 285>>249>>291>>277 but normal liver biomarkers  She denies abdominal discomfort, however patient is a poor historian  GGTP mildly elevated  Her CT abdomen and pelvis without any evidence of hepatic or biliary abnormality  · Follow-up as outpatient after antimicrobial treatment  · CMP in 1 week and results to her PMD        Thyroid disease   Assessment & Plan    Pt on Levothyroxine  TSH normal   · Continue as previously directed  Gait abnormality   Assessment & Plan    Patient has difficulty ambulating at baseline and uses a walker  Worsening at presentation likely secondary to UTI and hyponatremia  Improved and now close to baseline  · Continue PT/OT on d/c 3x/week  Parkinson's disease ([facility] [address] )   Assessment & Plan    On Sinemet RTC  · Continue Sinemet as previously directed  · Patient with 2 caregivers at home instructed and aware of fall precautions    · Continue PT/OT eval   Patient goes to [place] 3 times a week             Discharging Physician / Practitioner: Damion Castañeda  PCP: Alee Can  Admission Date: 11/19/2018  Discharge Date: 11/23/18    Reason for Admission: Weakness - Generalized (generalized weakness started yesterday, whenn walking up to bathroom pt feels that her legs gives up  increase with back pain, new onset with R groin pain)        Resolved Problems  Date Reviewed: 11/23/2018    None          Consultations During Hospital Stay:    IP CONSULT TO CASE MANAGEMENT  IP CONSULT TO NEPHROLOGY  IP CONSULT TO UROLOGY    Procedures Performed:     · Left retrograde pyelogram    Significant Findings / Test Results:     · See below    Results from last 7 days  Lab Units 11/23/18  0626 11/22/18  0647 11/21/18  0532   WBC Thousand/uL 5 61 4 70 5 39   HEMOGLOBIN g/dL 13 9 12 1 13 0   PLATELETS Thousands/uL 348 322 299       Results from last 7 days  Lab Units 11/23/18  0626 11/22/18  0647 11/21/18  1337 11/21/18  0532  11/20/18  0546   SODIUM mmol/L 143 139 138 140  < > 133*   POTASSIUM mmol/L 4 0 3 9  --  3 6  --  3 8   CHLORIDE mmol/L 104 103  --  104  --  102   CO2 mmol/L 28 26  --  26  --  21   BUN mg/dL 14 17  --  16  --  16   CREATININE mg/dL 0 98 0 88  --  0 94  --  1 04   CALCIUM mg/dL 9 9 9 4  --  9 0  --  8 7   TOTAL BILIRUBIN mg/dL  --  0 20  --  0 30  --  0 40   ALK PHOS U/L  --  277*  --  291*  --  249*   ALT U/L  --  16  --  15  --  15   AST U/L  --  25  --  34  --  40   < > = values in this interval not displayed  No results found for: HGBA1C        Results from last 7 days  Lab Units 11/22/18  0647 11/21/18  0532   PROCALCITONIN ng/ml 0 24 0 34*     Blood Culture:   Lab Results   Component Value Date    BLOODCX No Growth After 5 Days  02/23/2017    BLOODCX No Growth After 5 Days  02/23/2017     Urine Culture:   Lab Results   Component Value Date    URINECX >100,000 cfu/ml Escherichia coli (A) 11/19/2018    URINECX >100,000 cfu/ml Escherichia coli 02/23/2017    URINECX Culture results to follow   02/26/2016    URINECX 50,000-59,000 cfu/ml Klebsiella pneumoniae 02/26/2016     Sputum Culture: No components found for: SPUTUMCX  Wound Culture: No results found for: WOUNDCULT     Imaging  XR retrograde pyelogram   Final Result by Reema Brady MD (11/23 1321)      Fluoroscopic guidance provided for left retrograde pyelogram  Please see procedure report for further details  Workstation performed: [id number]         CT renal protocol   Final Result by Martin Blackwood MD (11/21 1539)         1  Moderate left hydronephrosis and proximal ureterectasis with no obstructive calculi and/or ureteral mass  Enhancement of the renal pelvis and ureter concerning for pyelitis  2   Patchy bilateral cortical enhancement concerning for pyelonephritis  No perinephric collections  3   Myomatous uterus  4   Trace pleural effusions with bibasilar atelectasis   5  Hiatal hernia  The study was marked in [facility] for immediate notification  Workstation performed: [id number]         US kidney and bladder   Final Result by Reema Brady MD (11/20 4252)      Normal right kidney  Moderate left hydronephrosis  Workstation performed: [id number]         CT renal stone study abdomen pelvis wo contrast   Final Result by Sujit Montemayor MD (11/19 1926)      1  Probable moderate left hydronephrosis versus multiple parapelvic cysts  Renal ultrasound is recommended for further evaluation  2   Fibroid uterus  I personally discussed this study with Twila Brock on 11/19/2018 at 7:25 PM                      Workstation performed: [id number]         CT head without contrast   Final Result by Sujit Montemayor MD (11/19 1900)      No acute intracranial abnormality  Stable examination  Workstation performed: [id number]         XR spine lumbar 2 or 3 views injury   Final Result by Steve Boles DO (11/19 6688)      Somewhat limited study  No acute osseous abnormality  Mild dextroscoliosis with moderately advanced multilevel degenerative changes           Workstation performed: [id number] Incidental Findings:   · See above    Test Results Pending at Discharge (will require follow up): · None     Outpatient Tests Requested:  · CBC and CMP    Complications:  None    Reason for Admission:  Generalized weakness    Hospital Course:     Harmeet Lugo is a 80 y o  female patient with a PMH of thyroid disease, Parkinson's disease who presents with weakness  S/P UTI and been treated by her PMD with Keflex  Patient presents with weakness and states that she went to the bathroom earlier and her legs gave out from under her  Urine was sent and consistent with UTI  She was initially started on Ceftriaxone and sensitivities came back and transitioned to Cefazolin IV  In addition, she was found to be hyponatremic with a sodium level 127  Family reports to poor oral intake and poor appetite  Her sodium on this admission was labile were Nephrology was consulted and IV fluids adjusted according to her level  Her BNP was conservatively monitor and 143 upon d/c   PT/OT also evaluated patient  Strength slightly improved but  persistent to take patient home as she does go to PT 3 times a week and under 2 caretakers at home  her alkaline phosphatase was elevated with negative liver biomarkers  Her CT A/P did not show any evidence of hepatic or biliary abnormality  She denies abdominal pain, nausea/vomiting  However, patient is a poor historian  Notably, her ultrasound showed moderate left hydronephrosis and Urology was consulted  PVR was negative for urinary retention  BP stable  CT scan urogram was done and revealed evidence of left-sided hydronephrosis with acute pyelonephritis  Left-sided stent placement was done today and patient tolerated procedure well  She is to continue oral antibiotics at home and to see Dr Aviva Villavicencio in 3 weeks for stent removal   Patient and her V/S stable upon discharge   and son at bedside with the care take care and no further questions noted  Please see above list of diagnoses and related plan for additional information  Condition at Discharge: stable       Discharge instructions/Information to patient and family:   See after visit summary for information provided to patient and family  Provisions for Follow-Up Care:  See after visit summary for information related to follow-up care and any pertinent home health orders  Disposition:     Home    Planned Readmission:  None     Discharge Statement:  I spent 30 minutes discharging the patient  This time was spent on the day of discharge  I had direct contact with the patient on the day of discharge  Greater than 50% of the total time was spent examining patient, answering all patient questions, arranging and discussing plan of care with patient as well as directly providing post-discharge instructions  Additional time then spent on discharge activities  Discharge Medications:  See after visit summary for reconciled discharge medications provided to patient and family        ** Please Note: This note has been constructed using a voice recognition system **

## 2018-11-23 NOTE — PROGRESS NOTES
Progress Note - Azucena Livers 1933, 80 y o  female MRN: [id number]    Unit/Bed#: [address] [address] Encounter: [id number]    Primary Care Provider: Andrea Matos   Date and time admitted to hospital: 11/19/2018  3:56 PM        * Acute pyelonephritis   Assessment & Plan    Patient presented with generalized weakness and difficulty in ambulation, noted to have evidence of persistent UTI in ED  Patient was seen in the ER couple weeks ago and diagnosed with urinary tract infection, treated with Keflex  Initially started on Rocephin  · Imaging revealed evidence of left-sided hydronephrosis  PVR acceptable  Seen by Urology  Appreciate input  Underwent CT nephrogram with evidence of left-sided hydronephrosis with associated pyelonephritis  Plan for  left-sided stent placement today  · Culture reveals E coli, pansensitive transitioned to Cefazolin day 2       Hyponatremia   Assessment & Plan    Sodium 127 on admission with a history of hyponatremia on previous admissions  · Likely secondary to volume depletion likely in setting of UTI  · Improved with IV fluids  · Seen and followed by Nephrology, input appreciated  · Sodium remained stable       Elevated alkaline phosphatase level   Assessment & Plan    Escalating Alk phos level 285>>249>>291>>277 but normal liver biomarkers  She denies abdominal discomfort  · GGTP mildly elevated  · CT abdomen and pelvis without any evidence of hepatic or biliary abnormality  · Follow-up as outpatient after antimicrobial treatment       Thyroid disease   Assessment & Plan    The On levothyroxine  TSH normal   · Continue        Gait abnormality   Assessment & Plan    Patient has difficulty ambulating at baseline and uses a walker  · Worsening at presentation likely secondary to UTI and hyponatremia  · Improving, now close to baseline  · Continue PT/OT     Parkinson's disease ([address] )   Assessment & Plan    On Sinemet RTC    · Continue sinemet  · Fall precautions  · PT/OT eval VTE Pharmacologic Prophylaxis:   Pharmacologic: Enoxaparin (Lovenox)  Mechanical VTE Prophylaxis in Place: Yes    Patient Centered Rounds: I have performed bedside rounds with nursing staff today  Discussions with Specialists or Other Care Team Provider: Yes  Education and Discussions with Family / Patient:Yes, I have answered all questions to the best of my ability and knowledge  Time Spent for Care: 30 minutes  More than 50% of total time spent on counseling and coordination of care as described above  Current Length of Stay: 3 day(s)  Current Patient Status: Inpatient     Discharge Plan:   once patient home  Patient has 2 caretakers at home  Code Status: Level 1 - Full Code      Subjective:      at bedside  Patient poor historian  She denies to any discomfort  Patient NPO for left-sided stent placement  Objective:     Vitals:   Temp (24hrs), Av 1 °F (36 7 °C), Min:97 8 °F (36 6 °C), Max:98 4 °F (36 9 °C)    Temp:  [97 8 °F (36 6 °C)-98 4 °F (36 9 °C)] 98 4 °F (36 9 °C)  HR:  [60-76] 60  Resp:  [18] 18  BP: (132-152)/(64-92) 132/72  SpO2:  [95 %-96 %] 96 %  Body mass index is 20 45 kg/m²  Input and Output Summary (last 24 hours): Intake/Output Summary (Last 24 hours) at 18 1117  Last data filed at 18 0806   Gross per 24 hour   Intake                0 ml   Output              600 ml   Net             -600 ml        Physical Exam:     Physical Exam   Constitutional: She appears well-developed and well-nourished  HENT:   Head: Normocephalic and atraumatic  Neck: Normal range of motion  Neck supple  Cardiovascular: Normal rate and regular rhythm  No murmur heard  S1 S2 noted   Pulmonary/Chest: Effort normal and breath sounds normal    Decreased breath sounds right L>R but clear   Abdominal: Soft  Bowel sounds are normal  She exhibits no distension  There is no tenderness  There is no rebound  Musculoskeletal: Normal range of motion   She exhibits no edema  Neurological: She is alert  Oriented to place and surroundings   Skin: Skin is warm and dry  Psychiatric: She has a normal mood and affect  Her behavior is normal  Judgment normal        Additional Data:     Labs:      Results from last 7 days  Lab Units 11/23/18 0626 11/22/18  0647 11/21/18  0532  11/19/18  1655   WBC Thousand/uL 5 61 4 70 5 39  < > 7 59   HEMOGLOBIN g/dL 13 9 12 1 13 0  < > 12 6   HEMATOCRIT % 42 3 36 7 39 6  < > 38 6   PLATELETS Thousands/uL 348 322 299  < > 277   NEUTROS PCT %  --   --   --   --  68   < > = values in this interval not displayed  Results from last 7 days  Lab Units 11/23/18  0626 11/22/18  0647 11/21/18  1337 11/21/18  0532  11/20/18  0546   SODIUM mmol/L 143 139 138 140  < > 133*   POTASSIUM mmol/L 4 0 3 9  --  3 6  --  3 8   CHLORIDE mmol/L 104 103  --  104  --  102   CO2 mmol/L 28 26  --  26  --  21   BUN mg/dL 14 17  --  16  --  16   CREATININE mg/dL 0 98 0 88  --  0 94  --  1 04   CALCIUM mg/dL 9 9 9 4  --  9 0  --  8 7   TOTAL BILIRUBIN mg/dL  --  0 20  --  0 30  --  0 40   ALK PHOS U/L  --  277*  --  291*  --  249*   ALT U/L  --  16  --  15  --  15   AST U/L  --  25  --  34  --  40   < > = values in this interval not displayed  No results found for: HGBA1C        Results from last 7 days  Lab Units 11/22/18 0647 11/21/18  0532   PROCALCITONIN ng/ml 0 24 0 34*       * I Have Reviewed All Lab Data Listed Above  * Additional Pertinent Lab Tests Reviewed: [address] Admission Reviewed    Imaging:     CT renal protocol   Final Result by Chaz Reyes MD (11/21 1539)         1  Moderate left hydronephrosis and proximal ureterectasis with no obstructive calculi and/or ureteral mass  Enhancement of the renal pelvis and ureter concerning for pyelitis  2   Patchy bilateral cortical enhancement concerning for pyelonephritis  No perinephric collections  3   Myomatous uterus     4   Trace pleural effusions with bibasilar atelectasis   5  Hiatal hernia  The study was marked in [facility] for immediate notification  Workstation performed: [id number]         US kidney and bladder   Final Result by Reema Brady MD (11/20 5247)      Normal right kidney  Moderate left hydronephrosis  Workstation performed: [id number]         CT renal stone study abdomen pelvis wo contrast   Final Result by Sujit Montemayor MD (11/19 1926)      1  Probable moderate left hydronephrosis versus multiple parapelvic cysts  Renal ultrasound is recommended for further evaluation  2   Fibroid uterus  I personally discussed this study with Twila Brock on 11/19/2018 at 7:25 PM                      Workstation performed: [id number]         CT head without contrast   Final Result by Sujit Montemayor MD (11/19 1900)      No acute intracranial abnormality  Stable examination  Workstation performed: [id number]         XR spine lumbar 2 or 3 views injury   Final Result by Steve Boles DO (11/19 7517)      Somewhat limited study  No acute osseous abnormality  Mild dextroscoliosis with moderately advanced multilevel degenerative changes  Workstation performed: [id number]         XR retrograde pyelogram    (Results Pending)     Imaging Reports Reviewed by myself    Cultures:   Blood Culture:   Lab Results   Component Value Date    BLOODCX No Growth After 5 Days  02/23/2017    BLOODCX No Growth After 5 Days  02/23/2017     Urine Culture:   Lab Results   Component Value Date    URINECX >100,000 cfu/ml Escherichia coli (A) 11/19/2018    URINECX >100,000 cfu/ml Escherichia coli 02/23/2017    URINECX Culture results to follow   02/26/2016    URINECX 50,000-59,000 cfu/ml Klebsiella pneumoniae 02/26/2016     Sputum Culture: No components found for: SPUTUMCX  Wound Culture: No results found for: WOUNDCULT    Last 24 Hours Medication List:     Current Facility-Administered Medications:  acetaminophen 650 mg Oral Q6H PRN Napoleon Stephen MD    artificial tear  Both Eyes HS Haley S Dumas, CRNP    ascorbic acid 500 mg Oral Daily Laveen Angles, CRNP    azelastine 1 spray Each Nare BID Napoleon Stephen MD    carbidopa-levodopa 1 tablet Oral 4x Daily Pascual Angles, CRNP    carbidopa-levodopa 1 5 tablet Oral TID Pascual Angles, CRNP    cefazolin 1,000 mg Intravenous Q8H Napoleon Stephen MD Last Rate: 1,000 mg (11/23/18 0356)   enoxaparin 30 mg Subcutaneous Daily Laveen Angles, CRNP    levothyroxine 50 mcg Oral Early Morning Laveen Angles, CRNP    melatonin 6 mg Oral HS Pascual Angles, CRNP    sodium chloride 1 spray Each Nare PRELEONORA Stephen MD         Today, Patient Was Seen By: MAZIN Mackay    ** Please Note: Dragon 360 Dictation voice to text software may have been used in the creation of this document   **

## 2018-11-23 NOTE — PLAN OF CARE
DISCHARGE PLANNING     Discharge to home or other facility with appropriate resources Adequate for Discharge        DISCHARGE PLANNING - CARE MANAGEMENT     Discharge to post-acute care or home with appropriate resources Adequate for Discharge        GENITOURINARY - ADULT     Maintains or returns to baseline urinary function Adequate for Discharge     Absence of urinary retention Adequate for Discharge        INFECTION - ADULT     Absence or prevention of progression during hospitalization Adequate for Discharge        Knowledge Deficit     Patient/family/caregiver demonstrates understanding of disease process, treatment plan, medications, and discharge instructions Adequate for Discharge        METABOLIC, FLUID AND ELECTROLYTES - ADULT     Electrolytes maintained within normal limits Adequate for Discharge     Fluid balance maintained Adequate for Discharge        Potential for Falls     Patient will remain free of falls Adequate for Discharge        Prexisting or High Potential for Compromised Skin Integrity     Skin integrity is maintained or improved Adequate for Discharge        SAFETY ADULT     Maintain or return to baseline ADL function Adequate for Discharge     Maintain or return mobility status to optimal level Adequate for Discharge

## 2018-11-23 NOTE — ASSESSMENT & PLAN NOTE
Patient presented with generalized weakness and difficulty in ambulation, noted to have evidence of persistent UTI in ED  Patient was seen in the ER couple weeks ago and diagnosed with urinary tract infection, treated with Keflex  Initially started on Rocephin  · Imaging revealed evidence of left-sided hydronephrosis  PVR no record of urinary retention  Seen by Urology  Appreciate input  Underwent CT nephrogram with evidence of left-sided hydronephrosis with associated pyelonephritis  · She underwent for left-sided stent placement today by Dr Arianna Armas and pt tolerated procedure well  Patient to follow up with Dr Arianna Armas in 3 weeks  Advised to call his office or go to the ED for any worsening urinary frequency, painful urination, lower back pain, and bleeding  Pyridium to take every 8 hours as needed for urinary discomfort informed that it will make her urine orange  · Urine culture reveals E coli, pansensitive transitioned to Cefazolin day 2  Patient to take Keflex x5 more days

## 2018-11-25 NOTE — LETTER
November 25, 2018     Nancy Irizarry  [address] [place] [id number]    Patient: Raeann Sherman   YOB: 1933   Date of Visit: 11/25/2018       Dear Dr Fredy Carballo:    Thank you for referring Viviana Flores to me for evaluation  Below are my notes for this consultation  If you have questions, please do not hesitate to call me  I look forward to following your patient along with you  Sincerely,        Erasmo Jones MD        CC: MD Erasmo Simeon MD  11/25/2018  9:13 PM  Signed  Got a call from patient's , spoke to them at 6pm,  that the patient is not feeling well  She feels sick, he was unable to elaborate any more  She was hospitalized recently and discharged 2 days ago after a cystoscopy and stent placement  On antibiotics, did not have fever  She was unable to elaborate any more on what she is feeling "just feeling sick"  I told her and the , considering she just had a procedure, there is always a risk of infection and she should be evaluated in ER  I do not think it is related to her parkinson's disease  They would like to watch the symptoms for now, and would consider going to ER if she does not feel better

## 2018-11-26 NOTE — TELEPHONE ENCOUNTER
Got a call from patient's , spoke to them at 6pm,  that the patient is not feeling well  She feels sick, he was unable to elaborate any more  She was hospitalized recently and discharged 2 days ago after a cystoscopy and stent placement  On antibiotics, did not have fever  She was unable to elaborate any more on what she is feeling "just feeling sick"  I told her and the , considering she just had a procedure, there is always a risk of infection and she should be evaluated in ER  I do not think it is related to her parkinson's disease  They would like to watch the symptoms for now, and would consider going to ER if she does not feel better

## 2018-11-27 NOTE — PROGRESS NOTES
Patient ID: Kavon Zurita is a 80 y o  female  Assessment/Plan:    Parkinson's disease ([facility] [address] )  Patient recently increased her Sinemet dose during the day which she feels has been helpful  She is getting a better on and not wearing off as quickly  Will have her remain on this dose for now  If the wearing off remains an issue or she starts to wear off sooner, then may consider starting the Comtan which she got filled but never started  She is also no longer taking the Sinemet CR before bed and denies any clear change without this dose  For now she would like to remain off of it  However if she notices issues in the middle of the night then she was encouraged to restart  She will remain on Sinemet 2tabs along with Sinemet CR 1 tab tid  She was also encouraged to restart therapy which she was doing prior to her last hospitalization  Subjective:    Mary Nichole is an 80year old woman with Parkinson's disease who presents for follow up  To review, she was diagnosed with Parkinson's disease 12 years ago at [facility]  She saw several neurologist prior to and after this diagnosis  She followed with her PCP in the past and he added Sinemet ER  She did not have improvement on Selegiline in the past and this was discontinued given the patient felt she was on too many medications and she had some GI side effects with recent retrial      At her last visit she was feeling overall weaker with some falls  She felt her gait and ambulation improved after taking Sinemet however she was having wearing off  She was started on a trial of Comtan  In the past she "did not feel right" on higher doses of Sinemet  She is currently taking Sinemet 1 5tabs tid and Sinemet CR 1tab tid and 2tabs qhs  She was also noted to have recent weight loss and discussed starting some supplements such as Boost       She has been in the hospital multiple times since the last visit    Most recently she was admitted on 11/19 and found to have a UTI and left sided hydronephrosis  She underwent left stent placement and was discharged home on antibiotics  She will complete the antibiotics next week  She recently started with a new family doctor who is more holistic  With this transfer she was started on Dopa-plus (a vitamin supplement)  The family is unsure if this is helping or not  She never started Comtan however not clear why this was never started  On Sunday she had an episode when she took her Sinemet and it did not kick in  She tried taking an extra tab and about 1 hour later she felt better  She is now taking Sinemet 2tabs along with Sinemet CR 1tab tid (8:30am, 12:30, 4:30)  She stopped taking the Sinemet CR at night with this increase  She denies any clear worsening in the middle of the night or morning with this change  She feels she has more energy with this dosing  She feels the medication kick in after about 30 minutes  She can move and function better when it kicks in  She is still having wearing off but only 10-15 minutes prior to the next dose  With only taking Sinemet 1 5tabs she was having wearing off much earlier  She never started the Comtan however unclear why  She is able to dress and shower on her own however she does get help at times  She has aids with her at all times and her  helps as well  She feels that she is sleeping well and actually feels that it is better lately  She does have to wake 2-3 times a night to use the bathroom  No hallucinations  She is swallowing well and feels that she is eating good  She is not doing much walking other than around the house with the walker  She had been doing PT three times a week however she has not restarted since her last hopsitalization  Total time spent today 35 minutes   Greater than 50% of total time was spent with the patient and / or family counseling and / or coordination of care       Objective:    Blood pressure 122/74, height 5' 1 5" (1 562 m), weight 53 1 kg (117 lb)  Physical Exam   Constitutional: She appears well-developed and well-nourished  Eyes: Pupils are equal, round, and reactive to light  EOM are normal        Neurological Exam  Mental Status  Awake, alert and oriented to person, place and time  Attention and concentration are normal   Slow to respond at times       Cranial Nerves  CN II: Visual fields full to confrontation  CN III, IV, VI: Extraocular movements intact bilaterally  Pupils equal round and reactive to light bilaterally  CN V: Facial sensation is normal   CN VII: Full and symmetric facial movement  CN VIII: Hearing is normal   CN IX, X: Palate elevates symmetrically  Normal gag reflex  CN XI: Shoulder shrug strength is normal   CN XII: Tongue midline without atrophy or fasciculations  Motor    No resting tremor of the hands  No action tremors  Mild chin tremor noted  Mild to moderate bradykinesia with finger taps 1,1, hand  1,1, ACACIA 1,1, heel taps 1,2  No dyskinesia or dystonia  Moderate hypomimia  Mild hypophonia  No rigidity noted  Mild generalized bradykinesia  Arthritic changes noted in both hands       Sensory  Light touch is normal in upper and lower extremities  Reflexes  Glabellar tap present  Right palmomental absent  Left palmomental present  Gait  Arose using hands  Not formally ambulated today given she was not feeling up to it  Pull test deferred           ROS:    Review of Systems   Constitutional: Negative for appetite change and fever  HENT: Positive for sinus pain and sinus pressure  Negative for hearing loss, tinnitus, trouble swallowing and voice change  Eyes: Negative  Negative for photophobia and pain  Dry eyes   Respiratory: Negative  Negative for shortness of breath  Cardiovascular: Negative  Negative for palpitations  Gastrointestinal: Positive for constipation  Negative for nausea and vomiting  Endocrine: Negative    Negative for cold intolerance and heat intolerance  Genitourinary: Negative  Negative for dysuria, frequency and urgency  Musculoskeletal: Positive for back pain, gait problem and neck pain  Negative for myalgias  Shoulder pain   Skin: Negative  Negative for rash  Neurological: Positive for weakness  Negative for dizziness, tremors, seizures, syncope, facial asymmetry, speech difficulty, light-headedness, numbness and headaches  Hematological: Negative  Does not bruise/bleed easily  Psychiatric/Behavioral: Positive for sleep disturbance (trouble sleeping)  Negative for confusion and hallucinations

## 2018-11-27 NOTE — ASSESSMENT & PLAN NOTE
Patient recently increased her Sinemet dose during the day which she feels has been helpful  She is getting a better on and not wearing off as quickly  Will have her remain on this dose for now  If the wearing off remains an issue or she starts to wear off sooner, then may consider starting the Comtan which she got filled but never started  She is also no longer taking the Sinemet CR before bed and denies any clear change without this dose  For now she would like to remain off of it  However if she notices issues in the middle of the night then she was encouraged to restart  She will remain on Sinemet 2tabs along with Sinemet CR 1 tab tid  She was also encouraged to restart therapy which she was doing prior to her last hospitalization

## 2018-11-27 NOTE — PATIENT INSTRUCTIONS
Patient overall doing better since increasing the Sinemet dose  She does still however have some wearing off  She will continue on Sinemet 2tabs along with Sinemet CR 1tab three times a day  If the wearing off remains an issue she will try taking the Comtan (Entacapone) along with each dose of Sinemet  Patient with constipation, discussed trying to increase fiber, water intake and trial of MiraLax

## 2019-01-01 ENCOUNTER — ANESTHESIA (INPATIENT)
Dept: PERIOP | Facility: HOSPITAL | Age: 84
DRG: 178 | End: 2019-01-01
Payer: MEDICARE

## 2019-01-01 ENCOUNTER — HOSPITAL ENCOUNTER (EMERGENCY)
Facility: HOSPITAL | Age: 84
Discharge: HOME | End: 2019-02-14
Attending: EMERGENCY MEDICINE | Admitting: EMERGENCY MEDICINE
Payer: MEDICARE

## 2019-01-01 ENCOUNTER — HOSPITAL ENCOUNTER (OUTPATIENT)
Dept: RADIOLOGY | Facility: HOSPITAL | Age: 84
Discharge: HOME | End: 2019-09-24
Attending: SPECIALIST
Payer: MEDICARE

## 2019-01-01 ENCOUNTER — APPOINTMENT (INPATIENT)
Dept: NON INVASIVE DIAGNOSTICS | Facility: HOSPITAL | Age: 84
DRG: 177 | End: 2019-01-01
Payer: MEDICARE

## 2019-01-01 ENCOUNTER — APPOINTMENT (INPATIENT)
Dept: PERIOP | Facility: HOSPITAL | Age: 84
DRG: 178 | End: 2019-01-01
Payer: MEDICARE

## 2019-01-01 ENCOUNTER — OFFICE VISIT (OUTPATIENT)
Dept: NEUROLOGY | Facility: CLINIC | Age: 84
End: 2019-01-01
Payer: MEDICARE

## 2019-01-01 ENCOUNTER — TRANSCRIBE ORDERS (OUTPATIENT)
Dept: ADMINISTRATIVE | Facility: HOSPITAL | Age: 84
End: 2019-01-01

## 2019-01-01 ENCOUNTER — ANESTHESIA EVENT (INPATIENT)
Dept: PERIOP | Facility: HOSPITAL | Age: 84
DRG: 178 | End: 2019-01-01
Payer: MEDICARE

## 2019-01-01 ENCOUNTER — TELEPHONE (OUTPATIENT)
Dept: NEUROLOGY | Facility: CLINIC | Age: 84
End: 2019-01-01

## 2019-01-01 ENCOUNTER — APPOINTMENT (INPATIENT)
Dept: RADIOLOGY | Facility: HOSPITAL | Age: 84
DRG: 177 | End: 2019-01-01
Payer: MEDICARE

## 2019-01-01 ENCOUNTER — APPOINTMENT (EMERGENCY)
Dept: RADIOLOGY | Facility: HOSPITAL | Age: 84
End: 2019-01-01
Payer: MEDICARE

## 2019-01-01 ENCOUNTER — APPOINTMENT (INPATIENT)
Dept: RADIOLOGY | Facility: HOSPITAL | Age: 84
DRG: 178 | End: 2019-01-01
Payer: MEDICARE

## 2019-01-01 ENCOUNTER — HOSPITAL ENCOUNTER (INPATIENT)
Facility: HOSPITAL | Age: 84
LOS: 7 days | Discharge: NURSING, REHAB OR LONG-TERM CARE | DRG: 177 | End: 2019-04-24
Attending: EMERGENCY MEDICINE | Admitting: FAMILY MEDICINE
Payer: MEDICARE

## 2019-01-01 ENCOUNTER — HOSPITAL ENCOUNTER (OUTPATIENT)
Dept: RADIOLOGY | Facility: HOSPITAL | Age: 84
Setting detail: OUTPATIENT SURGERY
Discharge: HOME | DRG: 178 | End: 2019-10-11
Payer: MEDICARE

## 2019-01-01 ENCOUNTER — TELEPHONE (OUTPATIENT)
Dept: GASTROENTEROLOGY | Facility: MEDICAL CENTER | Age: 84
End: 2019-01-01

## 2019-01-01 ENCOUNTER — HOSPITAL ENCOUNTER (OUTPATIENT)
Dept: RADIOLOGY | Facility: HOSPITAL | Age: 84
Discharge: HOME | End: 2019-02-21
Attending: SPECIALIST
Payer: MEDICARE

## 2019-01-01 ENCOUNTER — TELEPHONE (OUTPATIENT)
Dept: SPEECH THERAPY | Facility: HOSPITAL | Age: 84
End: 2019-01-01

## 2019-01-01 ENCOUNTER — HOSPITAL ENCOUNTER (INPATIENT)
Dept: PERIOP | Facility: HOSPITAL | Age: 84
LOS: 5 days | Discharge: NURSING, REHAB OR LONG-TERM CARE | DRG: 178 | End: 2019-10-16
Attending: FAMILY MEDICINE | Admitting: INTERNAL MEDICINE
Payer: MEDICARE

## 2019-01-01 ENCOUNTER — HOSPITAL ENCOUNTER (EMERGENCY)
Facility: HOSPITAL | Age: 84
Discharge: HOME | End: 2019-10-01
Attending: EMERGENCY MEDICINE
Payer: MEDICARE

## 2019-01-01 ENCOUNTER — OFFICE VISIT (OUTPATIENT)
Dept: GASTROENTEROLOGY | Facility: CLINIC | Age: 84
End: 2019-01-01
Payer: MEDICARE

## 2019-01-01 ENCOUNTER — APPOINTMENT (EMERGENCY)
Dept: RADIOLOGY | Facility: HOSPITAL | Age: 84
DRG: 177 | End: 2019-01-01
Payer: MEDICARE

## 2019-01-01 ENCOUNTER — TELEPHONE (OUTPATIENT)
Dept: GASTROENTEROLOGY | Facility: AMBULARY SURGERY CENTER | Age: 84
End: 2019-01-01

## 2019-01-01 VITALS
DIASTOLIC BLOOD PRESSURE: 64 MMHG | SYSTOLIC BLOOD PRESSURE: 140 MMHG | WEIGHT: 114.64 LBS | BODY MASS INDEX: 21.31 KG/M2 | TEMPERATURE: 98.1 F | OXYGEN SATURATION: 95 % | RESPIRATION RATE: 18 BRPM | HEART RATE: 72 BPM

## 2019-01-01 VITALS
SYSTOLIC BLOOD PRESSURE: 126 MMHG | BODY MASS INDEX: 21.73 KG/M2 | HEIGHT: 61 IN | RESPIRATION RATE: 16 BRPM | OXYGEN SATURATION: 94 % | DIASTOLIC BLOOD PRESSURE: 84 MMHG | TEMPERATURE: 97.4 F | WEIGHT: 115.08 LBS | HEIGHT: 62 IN | BODY MASS INDEX: 21.31 KG/M2 | SYSTOLIC BLOOD PRESSURE: 168 MMHG | DIASTOLIC BLOOD PRESSURE: 77 MMHG | HEART RATE: 70 BPM

## 2019-01-01 VITALS
HEIGHT: 61 IN | BODY MASS INDEX: 20.11 KG/M2 | DIASTOLIC BLOOD PRESSURE: 80 MMHG | SYSTOLIC BLOOD PRESSURE: 128 MMHG | HEIGHT: 61 IN | SYSTOLIC BLOOD PRESSURE: 124 MMHG | BODY MASS INDEX: 21.74 KG/M2 | DIASTOLIC BLOOD PRESSURE: 78 MMHG | HEART RATE: 66 BPM | WEIGHT: 106.5 LBS

## 2019-01-01 VITALS
DIASTOLIC BLOOD PRESSURE: 76 MMHG | SYSTOLIC BLOOD PRESSURE: 114 MMHG | BODY MASS INDEX: 18.91 KG/M2 | HEIGHT: 61 IN | TEMPERATURE: 97.6 F | HEART RATE: 72 BPM | WEIGHT: 100.13 LBS

## 2019-01-01 VITALS
DIASTOLIC BLOOD PRESSURE: 76 MMHG | HEART RATE: 60 BPM | RESPIRATION RATE: 22 BRPM | TEMPERATURE: 98.5 F | BODY MASS INDEX: 20.78 KG/M2 | OXYGEN SATURATION: 95 % | WEIGHT: 110 LBS | SYSTOLIC BLOOD PRESSURE: 177 MMHG

## 2019-01-01 VITALS
HEART RATE: 70 BPM | BODY MASS INDEX: 21.14 KG/M2 | WEIGHT: 112 LBS | SYSTOLIC BLOOD PRESSURE: 174 MMHG | HEIGHT: 61 IN | TEMPERATURE: 98.8 F | RESPIRATION RATE: 18 BRPM | DIASTOLIC BLOOD PRESSURE: 78 MMHG | OXYGEN SATURATION: 98 %

## 2019-01-01 DIAGNOSIS — N13.30 HYDRONEPHROSIS, UNSPECIFIED HYDRONEPHROSIS TYPE: Primary | ICD-10-CM

## 2019-01-01 DIAGNOSIS — E87.6 HYPOKALEMIA: ICD-10-CM

## 2019-01-01 DIAGNOSIS — K59.00 CONSTIPATION: ICD-10-CM

## 2019-01-01 DIAGNOSIS — E03.9 HYPOTHYROIDISM, UNSPECIFIED TYPE: Chronic | ICD-10-CM

## 2019-01-01 DIAGNOSIS — R11.0 NAUSEA: ICD-10-CM

## 2019-01-01 DIAGNOSIS — R63.4 WEIGHT LOSS: ICD-10-CM

## 2019-01-01 DIAGNOSIS — G20 PARKINSON'S DISEASE (HCC): Chronic | ICD-10-CM

## 2019-01-01 DIAGNOSIS — E07.9 THYROID DISEASE: ICD-10-CM

## 2019-01-01 DIAGNOSIS — Z93.1 S/P PERCUTANEOUS ENDOSCOPIC GASTROSTOMY (PEG) TUBE PLACEMENT (HCC): ICD-10-CM

## 2019-01-01 DIAGNOSIS — R26.9 GAIT ABNORMALITY: ICD-10-CM

## 2019-01-01 DIAGNOSIS — G93.41 ACUTE METABOLIC ENCEPHALOPATHY: ICD-10-CM

## 2019-01-01 DIAGNOSIS — G47.9 SLEEP DISTURBANCE: ICD-10-CM

## 2019-01-01 DIAGNOSIS — J69.0 ASPIRATION PNEUMONIA OF BOTH LOWER LOBES, UNSPECIFIED ASPIRATION PNEUMONIA TYPE (HCC): ICD-10-CM

## 2019-01-01 DIAGNOSIS — R06.00 DYSPNEA: Primary | ICD-10-CM

## 2019-01-01 DIAGNOSIS — K44.9 HIATAL HERNIA: ICD-10-CM

## 2019-01-01 DIAGNOSIS — I10 ESSENTIAL HYPERTENSION: ICD-10-CM

## 2019-01-01 DIAGNOSIS — H04.129 DRY EYE: ICD-10-CM

## 2019-01-01 DIAGNOSIS — J15.9 COMMUNITY ACQUIRED BACTERIAL PNEUMONIA: ICD-10-CM

## 2019-01-01 DIAGNOSIS — R13.19 OTHER DYSPHAGIA: ICD-10-CM

## 2019-01-01 DIAGNOSIS — M19.90 ARTHRITIS: ICD-10-CM

## 2019-01-01 DIAGNOSIS — R74.8 ELEVATED ALKALINE PHOSPHATASE LEVEL: ICD-10-CM

## 2019-01-01 DIAGNOSIS — J95.4: ICD-10-CM

## 2019-01-01 DIAGNOSIS — J06.9 VIRAL URI WITH COUGH: Primary | ICD-10-CM

## 2019-01-01 DIAGNOSIS — R53.1 WEAKNESS: ICD-10-CM

## 2019-01-01 DIAGNOSIS — J10.1 INFLUENZA B: ICD-10-CM

## 2019-01-01 DIAGNOSIS — R29.6 FALLS FREQUENTLY: ICD-10-CM

## 2019-01-01 DIAGNOSIS — J18.9 PNEUMONIA: Primary | ICD-10-CM

## 2019-01-01 DIAGNOSIS — N30.20 BLADDER INFECTION, CHRONIC: ICD-10-CM

## 2019-01-01 DIAGNOSIS — N30.20 BLADDER INFECTION, CHRONIC: Primary | ICD-10-CM

## 2019-01-01 DIAGNOSIS — I48.0 PAROXYSMAL A-FIB (HCC): ICD-10-CM

## 2019-01-01 DIAGNOSIS — R05.9 COUGH: ICD-10-CM

## 2019-01-01 DIAGNOSIS — E87.1 HYPONATREMIA: ICD-10-CM

## 2019-01-01 DIAGNOSIS — K22.89 ESOPHAGEAL DILATATION: ICD-10-CM

## 2019-01-01 DIAGNOSIS — K22.89 ESOPHAGEAL DILATATION: Primary | ICD-10-CM

## 2019-01-01 DIAGNOSIS — R13.19 OTHER DYSPHAGIA: Primary | ICD-10-CM

## 2019-01-01 DIAGNOSIS — G20 PARKINSON'S DISEASE (HCC): Primary | Chronic | ICD-10-CM

## 2019-01-01 DIAGNOSIS — Q62.11 HYDRONEPHROSIS WITH URETEROPELVIC JUNCTION (UPJ) OBSTRUCTION: ICD-10-CM

## 2019-01-01 DIAGNOSIS — N13.30 HYDRONEPHROSIS, UNSPECIFIED HYDRONEPHROSIS TYPE: ICD-10-CM

## 2019-01-01 DIAGNOSIS — R03.0 ELEVATED BLOOD PRESSURE READING: ICD-10-CM

## 2019-01-01 DIAGNOSIS — R52 PAIN: ICD-10-CM

## 2019-01-01 DIAGNOSIS — N10 ACUTE PYELONEPHRITIS: ICD-10-CM

## 2019-01-01 DIAGNOSIS — R53.83 LETHARGY: ICD-10-CM

## 2019-01-01 LAB
ALBUMIN SERPL BCP-MCNC: 2.3 G/DL (ref 3.5–5)
ALBUMIN SERPL BCP-MCNC: 2.4 G/DL (ref 3.5–5)
ALBUMIN SERPL BCP-MCNC: 2.8 G/DL (ref 3.5–5)
ALBUMIN SERPL BCP-MCNC: 3 G/DL (ref 3.5–5)
ALBUMIN SERPL BCP-MCNC: 3.1 G/DL (ref 3.5–5)
ALP BONE CFR SERPL: 28 % (ref 14–68)
ALP INTEST CFR SERPL: 0 % (ref 0–18)
ALP LIVER CFR SERPL: 72 % (ref 18–85)
ALP SERPL-CCNC: 101 U/L (ref 46–116)
ALP SERPL-CCNC: 116 U/L (ref 46–116)
ALP SERPL-CCNC: 242 IU/L (ref 39–117)
ALP SERPL-CCNC: 279 U/L (ref 46–116)
ALP SERPL-CCNC: 315 U/L (ref 46–116)
ALP SERPL-CCNC: 334 U/L (ref 46–116)
ALT SERPL W P-5'-P-CCNC: 13 U/L (ref 12–78)
ALT SERPL W P-5'-P-CCNC: 23 U/L (ref 12–78)
ALT SERPL W P-5'-P-CCNC: 23 U/L (ref 12–78)
ALT SERPL W P-5'-P-CCNC: 8 U/L (ref 12–78)
ALT SERPL W P-5'-P-CCNC: <6 U/L (ref 12–78)
ANION GAP SERPL CALCULATED.3IONS-SCNC: 10 MMOL/L (ref 4–13)
ANION GAP SERPL CALCULATED.3IONS-SCNC: 11 MMOL/L (ref 4–13)
ANION GAP SERPL CALCULATED.3IONS-SCNC: 11 MMOL/L (ref 4–13)
ANION GAP SERPL CALCULATED.3IONS-SCNC: 12 MMOL/L (ref 4–13)
ANION GAP SERPL CALCULATED.3IONS-SCNC: 5 MMOL/L (ref 4–13)
ANION GAP SERPL CALCULATED.3IONS-SCNC: 6 MMOL/L (ref 4–13)
ANION GAP SERPL CALCULATED.3IONS-SCNC: 7 MMOL/L (ref 4–13)
ANION GAP SERPL CALCULATED.3IONS-SCNC: 8 MMOL/L (ref 4–13)
ANION GAP SERPL CALCULATED.3IONS-SCNC: 8 MMOL/L (ref 4–13)
ANION GAP SERPL CALCULATED.3IONS-SCNC: 9 MMOL/L (ref 4–13)
ANION GAP SERPL CALCULATED.3IONS-SCNC: 9 MMOL/L (ref 4–13)
APTT PPP: 29 SECONDS (ref 25–32)
APTT PPP: 30 SECONDS (ref 24–33)
AST SERPL W P-5'-P-CCNC: 23 U/L (ref 5–45)
AST SERPL W P-5'-P-CCNC: 32 U/L (ref 5–45)
AST SERPL W P-5'-P-CCNC: 32 U/L (ref 5–45)
AST SERPL W P-5'-P-CCNC: 34 U/L (ref 5–45)
AST SERPL W P-5'-P-CCNC: 38 U/L (ref 5–45)
ATRIAL RATE: 62 BPM
ATRIAL RATE: 62 BPM
ATRIAL RATE: 63 BPM
ATRIAL RATE: 64 BPM
ATRIAL RATE: 70 BPM
ATRIAL RATE: 71 BPM
ATRIAL RATE: 72 BPM
ATRIAL RATE: 92 BPM
BACTERIA BLD CULT: NORMAL
BACTERIA UR CULT: ABNORMAL
BACTERIA UR CULT: NORMAL
BACTERIA UR QL AUTO: ABNORMAL /HPF
BASOPHILS # BLD AUTO: 0.02 THOUSANDS/ΜL (ref 0–0.1)
BASOPHILS NFR BLD AUTO: 0 % (ref 0–1)
BILIRUB DIRECT SERPL-MCNC: 0.1 MG/DL (ref 0–0.2)
BILIRUB SERPL-MCNC: 0.3 MG/DL (ref 0.2–1)
BILIRUB SERPL-MCNC: 0.4 MG/DL (ref 0.2–1)
BILIRUB SERPL-MCNC: 0.4 MG/DL (ref 0.2–1)
BILIRUB SERPL-MCNC: 0.5 MG/DL (ref 0.2–1)
BILIRUB SERPL-MCNC: 0.7 MG/DL (ref 0.2–1)
BILIRUB UR QL STRIP: NEGATIVE
BUN SERPL-MCNC: 11 MG/DL (ref 5–25)
BUN SERPL-MCNC: 12 MG/DL (ref 5–25)
BUN SERPL-MCNC: 12 MG/DL (ref 5–25)
BUN SERPL-MCNC: 13 MG/DL (ref 5–25)
BUN SERPL-MCNC: 13 MG/DL (ref 5–25)
BUN SERPL-MCNC: 14 MG/DL (ref 5–25)
BUN SERPL-MCNC: 15 MG/DL (ref 5–25)
BUN SERPL-MCNC: 16 MG/DL (ref 5–25)
BUN SERPL-MCNC: 18 MG/DL (ref 5–25)
BUN SERPL-MCNC: 18 MG/DL (ref 5–25)
BUN SERPL-MCNC: 21 MG/DL (ref 5–25)
BUN SERPL-MCNC: 8 MG/DL (ref 5–25)
BUN SERPL-MCNC: 8 MG/DL (ref 5–25)
CALCIUM SERPL-MCNC: 8.2 MG/DL (ref 8.3–10.1)
CALCIUM SERPL-MCNC: 8.6 MG/DL (ref 8.3–10.1)
CALCIUM SERPL-MCNC: 8.7 MG/DL (ref 8.3–10.1)
CALCIUM SERPL-MCNC: 8.8 MG/DL (ref 8.3–10.1)
CALCIUM SERPL-MCNC: 8.9 MG/DL (ref 8.3–10.1)
CALCIUM SERPL-MCNC: 9 MG/DL (ref 8.3–10.1)
CALCIUM SERPL-MCNC: 9 MG/DL (ref 8.3–10.1)
CALCIUM SERPL-MCNC: 9.1 MG/DL (ref 8.3–10.1)
CALCIUM SERPL-MCNC: 9.4 MG/DL (ref 8.3–10.1)
CALCIUM SERPL-MCNC: 9.5 MG/DL (ref 8.3–10.1)
CALCIUM SERPL-MCNC: 9.9 MG/DL (ref 8.3–10.1)
CAOX CRY URNS QL MICRO: ABNORMAL /HPF
CHLORIDE SERPL-SCNC: 100 MMOL/L (ref 100–108)
CHLORIDE SERPL-SCNC: 102 MMOL/L (ref 100–108)
CHLORIDE SERPL-SCNC: 102 MMOL/L (ref 100–108)
CHLORIDE SERPL-SCNC: 104 MMOL/L (ref 100–108)
CHLORIDE SERPL-SCNC: 104 MMOL/L (ref 100–108)
CHLORIDE SERPL-SCNC: 105 MMOL/L (ref 100–108)
CHLORIDE SERPL-SCNC: 107 MMOL/L (ref 100–108)
CHLORIDE SERPL-SCNC: 107 MMOL/L (ref 100–108)
CHLORIDE SERPL-SCNC: 110 MMOL/L (ref 100–108)
CLARITY UR: ABNORMAL
CLARITY UR: ABNORMAL
CLARITY UR: CLEAR
CLARITY UR: CLEAR
CO2 SERPL-SCNC: 20 MMOL/L (ref 21–32)
CO2 SERPL-SCNC: 23 MMOL/L (ref 21–32)
CO2 SERPL-SCNC: 24 MMOL/L (ref 21–32)
CO2 SERPL-SCNC: 24 MMOL/L (ref 21–32)
CO2 SERPL-SCNC: 25 MMOL/L (ref 21–32)
CO2 SERPL-SCNC: 25 MMOL/L (ref 21–32)
CO2 SERPL-SCNC: 26 MMOL/L (ref 21–32)
CO2 SERPL-SCNC: 26 MMOL/L (ref 21–32)
CO2 SERPL-SCNC: 28 MMOL/L (ref 21–32)
CO2 SERPL-SCNC: 31 MMOL/L (ref 21–32)
CO2 SERPL-SCNC: 31 MMOL/L (ref 21–32)
COLOR UR: YELLOW
CREAT SERPL-MCNC: 0.64 MG/DL (ref 0.6–1.3)
CREAT SERPL-MCNC: 0.64 MG/DL (ref 0.6–1.3)
CREAT SERPL-MCNC: 0.66 MG/DL (ref 0.6–1.3)
CREAT SERPL-MCNC: 0.68 MG/DL (ref 0.6–1.3)
CREAT SERPL-MCNC: 0.7 MG/DL (ref 0.6–1.3)
CREAT SERPL-MCNC: 0.81 MG/DL (ref 0.6–1.3)
CREAT SERPL-MCNC: 0.81 MG/DL (ref 0.6–1.3)
CREAT SERPL-MCNC: 0.82 MG/DL (ref 0.6–1.3)
CREAT SERPL-MCNC: 0.83 MG/DL (ref 0.6–1.3)
CREAT SERPL-MCNC: 0.84 MG/DL (ref 0.6–1.3)
CREAT SERPL-MCNC: 0.88 MG/DL (ref 0.6–1.3)
CREAT SERPL-MCNC: 0.94 MG/DL (ref 0.6–1.3)
CREAT SERPL-MCNC: 1.11 MG/DL (ref 0.6–1.3)
DEPRECATED D DIMER PPP: 950 NG/ML (FEU) (ref 190–520)
EOSINOPHIL # BLD AUTO: 0.04 THOUSAND/ΜL (ref 0–0.61)
EOSINOPHIL # BLD AUTO: 0.04 THOUSAND/ΜL (ref 0–0.61)
EOSINOPHIL # BLD AUTO: 0.08 THOUSAND/ΜL (ref 0–0.61)
EOSINOPHIL # BLD AUTO: 0.09 THOUSAND/ΜL (ref 0–0.61)
EOSINOPHIL # BLD AUTO: 0.18 THOUSAND/ΜL (ref 0–0.61)
EOSINOPHIL NFR BLD AUTO: 1 % (ref 0–6)
EOSINOPHIL NFR BLD AUTO: 2 % (ref 0–6)
EOSINOPHIL NFR BLD AUTO: 3 % (ref 0–6)
ERYTHROCYTE [DISTWIDTH] IN BLOOD BY AUTOMATED COUNT: 13.7 % (ref 11.6–15.1)
ERYTHROCYTE [DISTWIDTH] IN BLOOD BY AUTOMATED COUNT: 13.8 % (ref 11.6–15.1)
ERYTHROCYTE [DISTWIDTH] IN BLOOD BY AUTOMATED COUNT: 13.9 % (ref 11.6–15.1)
ERYTHROCYTE [DISTWIDTH] IN BLOOD BY AUTOMATED COUNT: 13.9 % (ref 11.6–15.1)
ERYTHROCYTE [DISTWIDTH] IN BLOOD BY AUTOMATED COUNT: 14 % (ref 11.6–15.1)
ERYTHROCYTE [DISTWIDTH] IN BLOOD BY AUTOMATED COUNT: 14.1 % (ref 11.6–15.1)
ERYTHROCYTE [DISTWIDTH] IN BLOOD BY AUTOMATED COUNT: 14.1 % (ref 11.6–15.1)
ERYTHROCYTE [DISTWIDTH] IN BLOOD BY AUTOMATED COUNT: 14.3 % (ref 11.6–15.1)
FLUAV AG SPEC QL: NOT DETECTED
FLUBV AG SPEC QL: NOT DETECTED
GFR SERPL CREATININE-BSD FRML MDRD: 45 ML/MIN/1.73SQ M
GFR SERPL CREATININE-BSD FRML MDRD: 55 ML/MIN/1.73SQ M
GFR SERPL CREATININE-BSD FRML MDRD: 60 ML/MIN/1.73SQ M
GFR SERPL CREATININE-BSD FRML MDRD: 64 ML/MIN/1.73SQ M
GFR SERPL CREATININE-BSD FRML MDRD: 65 ML/MIN/1.73SQ M
GFR SERPL CREATININE-BSD FRML MDRD: 65 ML/MIN/1.73SQ M
GFR SERPL CREATININE-BSD FRML MDRD: 66 ML/MIN/1.73SQ M
GFR SERPL CREATININE-BSD FRML MDRD: 66 ML/MIN/1.73SQ M
GFR SERPL CREATININE-BSD FRML MDRD: 79 ML/MIN/1.73SQ M
GFR SERPL CREATININE-BSD FRML MDRD: 79 ML/MIN/1.73SQ M
GFR SERPL CREATININE-BSD FRML MDRD: 80 ML/MIN/1.73SQ M
GFR SERPL CREATININE-BSD FRML MDRD: 81 ML/MIN/1.73SQ M
GFR SERPL CREATININE-BSD FRML MDRD: 82 ML/MIN/1.73SQ M
GGT SERPL-CCNC: 89 U/L (ref 5–85)
GLUCOSE SERPL-MCNC: 102 MG/DL (ref 65–140)
GLUCOSE SERPL-MCNC: 109 MG/DL (ref 65–140)
GLUCOSE SERPL-MCNC: 122 MG/DL (ref 65–140)
GLUCOSE SERPL-MCNC: 78 MG/DL (ref 65–140)
GLUCOSE SERPL-MCNC: 80 MG/DL (ref 65–140)
GLUCOSE SERPL-MCNC: 90 MG/DL (ref 65–140)
GLUCOSE SERPL-MCNC: 91 MG/DL (ref 65–140)
GLUCOSE SERPL-MCNC: 94 MG/DL (ref 65–140)
GLUCOSE SERPL-MCNC: 96 MG/DL (ref 65–140)
GLUCOSE SERPL-MCNC: 96 MG/DL (ref 65–140)
GLUCOSE SERPL-MCNC: 97 MG/DL (ref 65–140)
GLUCOSE SERPL-MCNC: 98 MG/DL (ref 65–140)
GLUCOSE SERPL-MCNC: 99 MG/DL (ref 65–140)
GLUCOSE UR STRIP-MCNC: NEGATIVE MG/DL
HCT VFR BLD AUTO: 34.6 % (ref 34.8–46.1)
HCT VFR BLD AUTO: 36.4 % (ref 34.8–46.1)
HCT VFR BLD AUTO: 36.5 % (ref 34.8–46.1)
HCT VFR BLD AUTO: 39.3 % (ref 34.8–46.1)
HCT VFR BLD AUTO: 39.6 % (ref 34.8–46.1)
HCT VFR BLD AUTO: 39.8 % (ref 34.8–46.1)
HCT VFR BLD AUTO: 41.2 % (ref 34.8–46.1)
HCT VFR BLD AUTO: 44.4 % (ref 34.8–46.1)
HGB BLD-MCNC: 11.1 G/DL (ref 11.5–15.4)
HGB BLD-MCNC: 11.5 G/DL (ref 11.5–15.4)
HGB BLD-MCNC: 11.8 G/DL (ref 11.5–15.4)
HGB BLD-MCNC: 12.6 G/DL (ref 11.5–15.4)
HGB BLD-MCNC: 13 G/DL (ref 11.5–15.4)
HGB BLD-MCNC: 13.1 G/DL (ref 11.5–15.4)
HGB BLD-MCNC: 13.2 G/DL (ref 11.5–15.4)
HGB BLD-MCNC: 14.1 G/DL (ref 11.5–15.4)
HGB UR QL STRIP.AUTO: ABNORMAL
HGB UR QL STRIP.AUTO: NEGATIVE
IMM GRANULOCYTES # BLD AUTO: 0.01 THOUSAND/UL (ref 0–0.2)
IMM GRANULOCYTES # BLD AUTO: 0.02 THOUSAND/UL (ref 0–0.2)
IMM GRANULOCYTES # BLD AUTO: 0.03 THOUSAND/UL (ref 0–0.2)
IMM GRANULOCYTES NFR BLD AUTO: 0 % (ref 0–2)
INR PPP: 1 (ref 0.86–1.16)
INR PPP: 1.04 (ref 0.91–1.09)
KETONES UR STRIP-MCNC: ABNORMAL MG/DL
KETONES UR STRIP-MCNC: NEGATIVE MG/DL
L PNEUMO1 AG UR QL IA.RAPID: NEGATIVE
L PNEUMO1 AG UR QL IA.RAPID: NEGATIVE
LACTATE SERPL-SCNC: 0.7 MMOL/L (ref 0.5–2)
LACTATE SERPL-SCNC: 0.8 MMOL/L (ref 0.5–2)
LACTATE SERPL-SCNC: 1.6 MMOL/L (ref 0.5–2)
LEUKOCYTE ESTERASE UR QL STRIP: ABNORMAL
LEUKOCYTE ESTERASE UR QL STRIP: ABNORMAL
LEUKOCYTE ESTERASE UR QL STRIP: NEGATIVE
LEUKOCYTE ESTERASE UR QL STRIP: NEGATIVE
LYMPHOCYTES # BLD AUTO: 0.96 THOUSANDS/ΜL (ref 0.6–4.47)
LYMPHOCYTES # BLD AUTO: 1.13 THOUSANDS/ΜL (ref 0.6–4.47)
LYMPHOCYTES # BLD AUTO: 1.47 THOUSANDS/ΜL (ref 0.6–4.47)
LYMPHOCYTES # BLD AUTO: 1.66 THOUSANDS/ΜL (ref 0.6–4.47)
LYMPHOCYTES # BLD AUTO: 2.09 THOUSANDS/ΜL (ref 0.6–4.47)
LYMPHOCYTES NFR BLD AUTO: 13 % (ref 14–44)
LYMPHOCYTES NFR BLD AUTO: 21 % (ref 14–44)
LYMPHOCYTES NFR BLD AUTO: 23 % (ref 14–44)
LYMPHOCYTES NFR BLD AUTO: 27 % (ref 14–44)
LYMPHOCYTES NFR BLD AUTO: 31 % (ref 14–44)
MAGNESIUM SERPL-MCNC: 1.9 MG/DL (ref 1.6–2.6)
MAGNESIUM SERPL-MCNC: 2.4 MG/DL (ref 1.6–2.6)
MAGNESIUM SERPL-MCNC: 2.4 MG/DL (ref 1.6–2.6)
MCH RBC QN AUTO: 29.9 PG (ref 26.8–34.3)
MCH RBC QN AUTO: 30 PG (ref 26.8–34.3)
MCH RBC QN AUTO: 30.1 PG (ref 26.8–34.3)
MCH RBC QN AUTO: 30.1 PG (ref 26.8–34.3)
MCH RBC QN AUTO: 30.2 PG (ref 26.8–34.3)
MCH RBC QN AUTO: 30.2 PG (ref 26.8–34.3)
MCH RBC QN AUTO: 30.3 PG (ref 26.8–34.3)
MCH RBC QN AUTO: 30.4 PG (ref 26.8–34.3)
MCHC RBC AUTO-ENTMCNC: 31.5 G/DL (ref 31.4–37.4)
MCHC RBC AUTO-ENTMCNC: 31.8 G/DL (ref 31.4–37.4)
MCHC RBC AUTO-ENTMCNC: 32 G/DL (ref 31.4–37.4)
MCHC RBC AUTO-ENTMCNC: 32.1 G/DL (ref 31.4–37.4)
MCHC RBC AUTO-ENTMCNC: 32.1 G/DL (ref 31.4–37.4)
MCHC RBC AUTO-ENTMCNC: 32.4 G/DL (ref 31.4–37.4)
MCHC RBC AUTO-ENTMCNC: 32.8 G/DL (ref 31.4–37.4)
MCHC RBC AUTO-ENTMCNC: 32.9 G/DL (ref 31.4–37.4)
MCV RBC AUTO: 92 FL (ref 82–98)
MCV RBC AUTO: 92 FL (ref 82–98)
MCV RBC AUTO: 94 FL (ref 82–98)
MCV RBC AUTO: 95 FL (ref 82–98)
MCV RBC AUTO: 95 FL (ref 82–98)
MONOCYTES # BLD AUTO: 0.23 THOUSAND/ΜL (ref 0.17–1.22)
MONOCYTES # BLD AUTO: 0.38 THOUSAND/ΜL (ref 0.17–1.22)
MONOCYTES # BLD AUTO: 0.45 THOUSAND/ΜL (ref 0.17–1.22)
MONOCYTES # BLD AUTO: 0.53 THOUSAND/ΜL (ref 0.17–1.22)
MONOCYTES # BLD AUTO: 0.55 THOUSAND/ΜL (ref 0.17–1.22)
MONOCYTES NFR BLD AUTO: 4 % (ref 4–12)
MONOCYTES NFR BLD AUTO: 7 % (ref 4–12)
MONOCYTES NFR BLD AUTO: 8 % (ref 4–12)
MRSA NOSE QL CULT: NORMAL
NEUTROPHILS # BLD AUTO: 3.59 THOUSANDS/ΜL (ref 1.85–7.62)
NEUTROPHILS # BLD AUTO: 3.82 THOUSANDS/ΜL (ref 1.85–7.62)
NEUTROPHILS # BLD AUTO: 4.11 THOUSANDS/ΜL (ref 1.85–7.62)
NEUTROPHILS # BLD AUTO: 4.79 THOUSANDS/ΜL (ref 1.85–7.62)
NEUTROPHILS # BLD AUTO: 5.67 THOUSANDS/ΜL (ref 1.85–7.62)
NEUTS SEG NFR BLD AUTO: 61 % (ref 43–75)
NEUTS SEG NFR BLD AUTO: 66 % (ref 43–75)
NEUTS SEG NFR BLD AUTO: 67 % (ref 43–75)
NEUTS SEG NFR BLD AUTO: 71 % (ref 43–75)
NEUTS SEG NFR BLD AUTO: 79 % (ref 43–75)
NITRITE UR QL STRIP: NEGATIVE
NON-SQ EPI CELLS URNS QL MICRO: ABNORMAL /HPF
NRBC BLD AUTO-RTO: 0 /100 WBCS
NT-PROBNP SERPL-MCNC: 617 PG/ML
P AXIS: 101 DEGREES
P AXIS: 36 DEGREES
P AXIS: 70 DEGREES
P AXIS: 78 DEGREES
P AXIS: 88 DEGREES
PH UR STRIP.AUTO: 6 [PH]
PH UR STRIP.AUTO: 6.5 [PH]
PH UR STRIP.AUTO: 7 [PH]
PH UR STRIP.AUTO: 7 [PH] (ref 5–9)
PHOSPHATE SERPL-MCNC: 3.2 MG/DL (ref 2.3–4.1)
PHOSPHATE SERPL-MCNC: 3.4 MG/DL (ref 2.3–4.1)
PLATELET # BLD AUTO: 263 THOUSANDS/UL (ref 149–390)
PLATELET # BLD AUTO: 267 THOUSANDS/UL (ref 149–390)
PLATELET # BLD AUTO: 294 THOUSANDS/UL (ref 149–390)
PLATELET # BLD AUTO: 303 THOUSANDS/UL (ref 149–390)
PLATELET # BLD AUTO: 348 THOUSANDS/UL (ref 149–390)
PLATELET # BLD AUTO: 350 THOUSANDS/UL (ref 149–390)
PLATELET # BLD AUTO: 382 THOUSANDS/UL (ref 149–390)
PLATELET # BLD AUTO: 387 THOUSANDS/UL (ref 149–390)
PLATELET # BLD AUTO: 422 THOUSANDS/UL (ref 149–390)
PMV BLD AUTO: 10.4 FL (ref 8.9–12.7)
PMV BLD AUTO: 10.5 FL (ref 8.9–12.7)
PMV BLD AUTO: 10.5 FL (ref 8.9–12.7)
PMV BLD AUTO: 10.7 FL (ref 8.9–12.7)
PMV BLD AUTO: 10.9 FL (ref 8.9–12.7)
PMV BLD AUTO: 11.1 FL (ref 8.9–12.7)
PMV BLD AUTO: 11.4 FL (ref 8.9–12.7)
PMV BLD AUTO: 11.4 FL (ref 8.9–12.7)
PMV BLD AUTO: 12 FL (ref 8.9–12.7)
POTASSIUM SERPL-SCNC: 3.3 MMOL/L (ref 3.5–5.3)
POTASSIUM SERPL-SCNC: 3.4 MMOL/L (ref 3.5–5.3)
POTASSIUM SERPL-SCNC: 3.5 MMOL/L (ref 3.5–5.3)
POTASSIUM SERPL-SCNC: 3.6 MMOL/L (ref 3.5–5.3)
POTASSIUM SERPL-SCNC: 3.7 MMOL/L (ref 3.5–5.3)
POTASSIUM SERPL-SCNC: 3.7 MMOL/L (ref 3.5–5.3)
POTASSIUM SERPL-SCNC: 3.8 MMOL/L (ref 3.5–5.3)
POTASSIUM SERPL-SCNC: 3.8 MMOL/L (ref 3.5–5.3)
POTASSIUM SERPL-SCNC: 3.9 MMOL/L (ref 3.5–5.3)
POTASSIUM SERPL-SCNC: 3.9 MMOL/L (ref 3.5–5.3)
POTASSIUM SERPL-SCNC: 4 MMOL/L (ref 3.5–5.3)
POTASSIUM SERPL-SCNC: 4.2 MMOL/L (ref 3.5–5.3)
POTASSIUM SERPL-SCNC: 4.2 MMOL/L (ref 3.5–5.3)
PR INTERVAL: 152 MS
PR INTERVAL: 170 MS
PR INTERVAL: 184 MS
PR INTERVAL: 188 MS
PR INTERVAL: 188 MS
PR INTERVAL: 190 MS
PR INTERVAL: 200 MS
PROCALCITONIN SERPL-MCNC: 0.06 NG/ML
PROCALCITONIN SERPL-MCNC: 0.1 NG/ML
PROCALCITONIN SERPL-MCNC: 0.12 NG/ML
PROCALCITONIN SERPL-MCNC: 0.22 NG/ML
PROCALCITONIN SERPL-MCNC: 0.26 NG/ML
PROCALCITONIN SERPL-MCNC: 0.29 NG/ML
PROT SERPL-MCNC: 6.3 G/DL (ref 6.4–8.2)
PROT SERPL-MCNC: 6.6 G/DL (ref 6.4–8.2)
PROT SERPL-MCNC: 7.1 G/DL (ref 6.4–8.2)
PROT SERPL-MCNC: 7.4 G/DL (ref 6.4–8.2)
PROT SERPL-MCNC: 7.6 G/DL (ref 6.4–8.2)
PROT UR STRIP-MCNC: ABNORMAL MG/DL
PROTHROMBIN TIME: 10.5 SECONDS (ref 9.4–11.7)
PROTHROMBIN TIME: 11.1 SECONDS (ref 9.8–12)
QRS AXIS: -30 DEGREES
QRS AXIS: -34 DEGREES
QRS AXIS: -40 DEGREES
QRS AXIS: -40 DEGREES
QRS AXIS: -41 DEGREES
QRS AXIS: -41 DEGREES
QRS AXIS: -42 DEGREES
QRS AXIS: 2 DEGREES
QRSD INTERVAL: 62 MS
QRSD INTERVAL: 62 MS
QRSD INTERVAL: 70 MS
QRSD INTERVAL: 74 MS
QRSD INTERVAL: 78 MS
QRSD INTERVAL: 78 MS
QT INTERVAL: 334 MS
QT INTERVAL: 424 MS
QT INTERVAL: 426 MS
QT INTERVAL: 428 MS
QT INTERVAL: 432 MS
QT INTERVAL: 434 MS
QT INTERVAL: 450 MS
QT INTERVAL: 452 MS
QTC INTERVAL: 437 MS
QTC INTERVAL: 444 MS
QTC INTERVAL: 454 MS
QTC INTERVAL: 456 MS
QTC INTERVAL: 458 MS
QTC INTERVAL: 462 MS
QTC INTERVAL: 462 MS
QTC INTERVAL: 473 MS
RBC # BLD AUTO: 3.69 MILLION/UL (ref 3.81–5.12)
RBC # BLD AUTO: 3.85 MILLION/UL (ref 3.81–5.12)
RBC # BLD AUTO: 3.89 MILLION/UL (ref 3.81–5.12)
RBC # BLD AUTO: 4.19 MILLION/UL (ref 3.81–5.12)
RBC # BLD AUTO: 4.31 MILLION/UL (ref 3.81–5.12)
RBC # BLD AUTO: 4.31 MILLION/UL (ref 3.81–5.12)
RBC # BLD AUTO: 4.37 MILLION/UL (ref 3.81–5.12)
RBC # BLD AUTO: 4.7 MILLION/UL (ref 3.81–5.12)
RBC #/AREA URNS AUTO: ABNORMAL /HPF
RSV B RNA SPEC QL NAA+PROBE: NOT DETECTED
S PNEUM AG UR QL: NEGATIVE
S PNEUM AG UR QL: NEGATIVE
SODIUM SERPL-SCNC: 136 MMOL/L (ref 136–145)
SODIUM SERPL-SCNC: 138 MMOL/L (ref 136–145)
SODIUM SERPL-SCNC: 139 MMOL/L (ref 136–145)
SODIUM SERPL-SCNC: 140 MMOL/L (ref 136–145)
SODIUM SERPL-SCNC: 140 MMOL/L (ref 136–145)
SODIUM SERPL-SCNC: 141 MMOL/L (ref 136–145)
SODIUM SERPL-SCNC: 142 MMOL/L (ref 136–145)
SP GR UR STRIP.AUTO: 1.01 (ref 1–1.03)
SP GR UR STRIP.AUTO: 1.02 (ref 1–1.03)
T WAVE AXIS: 21 DEGREES
T WAVE AXIS: 22 DEGREES
T WAVE AXIS: 41 DEGREES
T WAVE AXIS: 59 DEGREES
T WAVE AXIS: 62 DEGREES
T WAVE AXIS: 64 DEGREES
T WAVE AXIS: 68 DEGREES
T WAVE AXIS: 73 DEGREES
TROPONIN I SERPL-MCNC: 0.02 NG/ML
TROPONIN I SERPL-MCNC: 0.02 NG/ML
TROPONIN I SERPL-MCNC: 0.05 NG/ML
TROPONIN I SERPL-MCNC: 0.18 NG/ML
TROPONIN I SERPL-MCNC: 0.2 NG/ML
TROPONIN I SERPL-MCNC: 0.25 NG/ML
TROPONIN I SERPL-MCNC: <0.02 NG/ML
TSH SERPL DL<=0.05 MIU/L-ACNC: 1.37 UIU/ML (ref 0.36–3.74)
TSH SERPL DL<=0.05 MIU/L-ACNC: 1.59 UIU/ML (ref 0.36–3.74)
UROBILINOGEN UR QL STRIP.AUTO: 0.2 E.U./DL
VENTRICULAR RATE: 111 BPM
VENTRICULAR RATE: 62 BPM
VENTRICULAR RATE: 62 BPM
VENTRICULAR RATE: 63 BPM
VENTRICULAR RATE: 64 BPM
VENTRICULAR RATE: 70 BPM
VENTRICULAR RATE: 71 BPM
VENTRICULAR RATE: 72 BPM
WBC # BLD AUTO: 5.19 THOUSAND/UL (ref 4.31–10.16)
WBC # BLD AUTO: 5.4 THOUSAND/UL (ref 4.31–10.16)
WBC # BLD AUTO: 5.4 THOUSAND/UL (ref 4.31–10.16)
WBC # BLD AUTO: 5.42 THOUSAND/UL (ref 4.31–10.16)
WBC # BLD AUTO: 6.77 THOUSAND/UL (ref 4.31–10.16)
WBC # BLD AUTO: 6.86 THOUSAND/UL (ref 4.31–10.16)
WBC # BLD AUTO: 7.22 THOUSAND/UL (ref 4.31–10.16)
WBC # BLD AUTO: 7.25 THOUSAND/UL (ref 4.31–10.16)
WBC #/AREA URNS AUTO: ABNORMAL /HPF

## 2019-01-01 PROCEDURE — 94640 AIRWAY INHALATION TREATMENT: CPT

## 2019-01-01 PROCEDURE — 92526 ORAL FUNCTION THERAPY: CPT

## 2019-01-01 PROCEDURE — 93010 ELECTROCARDIOGRAM REPORT: CPT | Performed by: INTERNAL MEDICINE

## 2019-01-01 PROCEDURE — 87186 SC STD MICRODIL/AGAR DIL: CPT | Performed by: NURSE PRACTITIONER

## 2019-01-01 PROCEDURE — 92611 MOTION FLUOROSCOPY/SWALLOW: CPT

## 2019-01-01 PROCEDURE — 97530 THERAPEUTIC ACTIVITIES: CPT

## 2019-01-01 PROCEDURE — 87086 URINE CULTURE/COLONY COUNT: CPT | Performed by: NURSE PRACTITIONER

## 2019-01-01 PROCEDURE — 84484 ASSAY OF TROPONIN QUANT: CPT | Performed by: STUDENT IN AN ORGANIZED HEALTH CARE EDUCATION/TRAINING PROGRAM

## 2019-01-01 PROCEDURE — 99232 SBSQ HOSP IP/OBS MODERATE 35: CPT | Performed by: INTERNAL MEDICINE

## 2019-01-01 PROCEDURE — 71045 X-RAY EXAM CHEST 1 VIEW: CPT

## 2019-01-01 PROCEDURE — 99239 HOSP IP/OBS DSCHRG MGMT >30: CPT | Performed by: STUDENT IN AN ORGANIZED HEALTH CARE EDUCATION/TRAINING PROGRAM

## 2019-01-01 PROCEDURE — 84484 ASSAY OF TROPONIN QUANT: CPT | Performed by: EMERGENCY MEDICINE

## 2019-01-01 PROCEDURE — 84080 ASSAY ALKALINE PHOSPHATASES: CPT | Performed by: PHYSICIAN ASSISTANT

## 2019-01-01 PROCEDURE — NC001 PR NO CHARGE: Performed by: PHYSICIAN ASSISTANT

## 2019-01-01 PROCEDURE — 80048 BASIC METABOLIC PNL TOTAL CA: CPT | Performed by: INTERNAL MEDICINE

## 2019-01-01 PROCEDURE — 87147 CULTURE TYPE IMMUNOLOGIC: CPT | Performed by: EMERGENCY MEDICINE

## 2019-01-01 PROCEDURE — 76770 US EXAM ABDO BACK WALL COMP: CPT

## 2019-01-01 PROCEDURE — G8979 MOBILITY GOAL STATUS: HCPCS

## 2019-01-01 PROCEDURE — 85730 THROMBOPLASTIN TIME PARTIAL: CPT | Performed by: INTERNAL MEDICINE

## 2019-01-01 PROCEDURE — G8988 SELF CARE GOAL STATUS: HCPCS

## 2019-01-01 PROCEDURE — 97163 PT EVAL HIGH COMPLEX 45 MIN: CPT

## 2019-01-01 PROCEDURE — 85025 COMPLETE CBC W/AUTO DIFF WBC: CPT | Performed by: INTERNAL MEDICINE

## 2019-01-01 PROCEDURE — 99215 OFFICE O/P EST HI 40 MIN: CPT | Performed by: PSYCHIATRY & NEUROLOGY

## 2019-01-01 PROCEDURE — 83735 ASSAY OF MAGNESIUM: CPT | Performed by: INTERNAL MEDICINE

## 2019-01-01 PROCEDURE — 85027 COMPLETE CBC AUTOMATED: CPT | Performed by: NURSE PRACTITIONER

## 2019-01-01 PROCEDURE — 85025 COMPLETE CBC W/AUTO DIFF WBC: CPT | Performed by: NURSE PRACTITIONER

## 2019-01-01 PROCEDURE — 74230 X-RAY XM SWLNG FUNCJ C+: CPT

## 2019-01-01 PROCEDURE — 97110 THERAPEUTIC EXERCISES: CPT

## 2019-01-01 PROCEDURE — G8996 SWALLOW CURRENT STATUS: HCPCS

## 2019-01-01 PROCEDURE — NC001 PR NO CHARGE: Performed by: INTERNAL MEDICINE

## 2019-01-01 PROCEDURE — 80048 BASIC METABOLIC PNL TOTAL CA: CPT | Performed by: FAMILY MEDICINE

## 2019-01-01 PROCEDURE — 93005 ELECTROCARDIOGRAM TRACING: CPT

## 2019-01-01 PROCEDURE — 99284 EMERGENCY DEPT VISIT MOD MDM: CPT

## 2019-01-01 PROCEDURE — G8987 SELF CARE CURRENT STATUS: HCPCS

## 2019-01-01 PROCEDURE — 83605 ASSAY OF LACTIC ACID: CPT | Performed by: INTERNAL MEDICINE

## 2019-01-01 PROCEDURE — G8997 SWALLOW GOAL STATUS: HCPCS

## 2019-01-01 PROCEDURE — 99285 EMERGENCY DEPT VISIT HI MDM: CPT

## 2019-01-01 PROCEDURE — 97167 OT EVAL HIGH COMPLEX 60 MIN: CPT

## 2019-01-01 PROCEDURE — 0DH63UZ INSERTION OF FEEDING DEVICE INTO STOMACH, PERCUTANEOUS APPROACH: ICD-10-PCS | Performed by: INTERNAL MEDICINE

## 2019-01-01 PROCEDURE — 83735 ASSAY OF MAGNESIUM: CPT | Performed by: NURSE PRACTITIONER

## 2019-01-01 PROCEDURE — 94760 N-INVAS EAR/PLS OXIMETRY 1: CPT

## 2019-01-01 PROCEDURE — 99232 SBSQ HOSP IP/OBS MODERATE 35: CPT | Performed by: FAMILY MEDICINE

## 2019-01-01 PROCEDURE — 84145 PROCALCITONIN (PCT): CPT | Performed by: INTERNAL MEDICINE

## 2019-01-01 PROCEDURE — 84443 ASSAY THYROID STIM HORMONE: CPT | Performed by: EMERGENCY MEDICINE

## 2019-01-01 PROCEDURE — G8978 MOBILITY CURRENT STATUS: HCPCS

## 2019-01-01 PROCEDURE — 36415 COLL VENOUS BLD VENIPUNCTURE: CPT | Performed by: EMERGENCY MEDICINE

## 2019-01-01 PROCEDURE — 87081 CULTURE SCREEN ONLY: CPT | Performed by: INTERNAL MEDICINE

## 2019-01-01 PROCEDURE — 83880 ASSAY OF NATRIURETIC PEPTIDE: CPT | Performed by: EMERGENCY MEDICINE

## 2019-01-01 PROCEDURE — 84100 ASSAY OF PHOSPHORUS: CPT | Performed by: NURSE PRACTITIONER

## 2019-01-01 PROCEDURE — 87086 URINE CULTURE/COLONY COUNT: CPT | Performed by: EMERGENCY MEDICINE

## 2019-01-01 PROCEDURE — 81001 URINALYSIS AUTO W/SCOPE: CPT | Performed by: EMERGENCY MEDICINE

## 2019-01-01 PROCEDURE — 99214 OFFICE O/P EST MOD 30 MIN: CPT | Performed by: INTERNAL MEDICINE

## 2019-01-01 PROCEDURE — 87186 SC STD MICRODIL/AGAR DIL: CPT | Performed by: EMERGENCY MEDICINE

## 2019-01-01 PROCEDURE — 80053 COMPREHEN METABOLIC PANEL: CPT | Performed by: NURSE PRACTITIONER

## 2019-01-01 PROCEDURE — 99222 1ST HOSP IP/OBS MODERATE 55: CPT | Performed by: INTERNAL MEDICINE

## 2019-01-01 PROCEDURE — 99223 1ST HOSP IP/OBS HIGH 75: CPT | Performed by: INTERNAL MEDICINE

## 2019-01-01 PROCEDURE — 97535 SELF CARE MNGMENT TRAINING: CPT

## 2019-01-01 PROCEDURE — 81001 URINALYSIS AUTO W/SCOPE: CPT | Performed by: INTERNAL MEDICINE

## 2019-01-01 PROCEDURE — 83605 ASSAY OF LACTIC ACID: CPT | Performed by: EMERGENCY MEDICINE

## 2019-01-01 PROCEDURE — 99223 1ST HOSP IP/OBS HIGH 75: CPT | Performed by: STUDENT IN AN ORGANIZED HEALTH CARE EDUCATION/TRAINING PROGRAM

## 2019-01-01 PROCEDURE — 85610 PROTHROMBIN TIME: CPT | Performed by: EMERGENCY MEDICINE

## 2019-01-01 PROCEDURE — 99214 OFFICE O/P EST MOD 30 MIN: CPT | Performed by: PHYSICIAN ASSISTANT

## 2019-01-01 PROCEDURE — 71046 X-RAY EXAM CHEST 2 VIEWS: CPT

## 2019-01-01 PROCEDURE — 85379 FIBRIN DEGRADATION QUANT: CPT | Performed by: EMERGENCY MEDICINE

## 2019-01-01 PROCEDURE — 80053 COMPREHEN METABOLIC PANEL: CPT | Performed by: INTERNAL MEDICINE

## 2019-01-01 PROCEDURE — 92610 EVALUATE SWALLOWING FUNCTION: CPT

## 2019-01-01 PROCEDURE — 71275 CT ANGIOGRAPHY CHEST: CPT

## 2019-01-01 PROCEDURE — 99232 SBSQ HOSP IP/OBS MODERATE 35: CPT | Performed by: NURSE PRACTITIONER

## 2019-01-01 PROCEDURE — 96360 HYDRATION IV INFUSION INIT: CPT

## 2019-01-01 PROCEDURE — 85025 COMPLETE CBC W/AUTO DIFF WBC: CPT | Performed by: EMERGENCY MEDICINE

## 2019-01-01 PROCEDURE — 87040 BLOOD CULTURE FOR BACTERIA: CPT | Performed by: EMERGENCY MEDICINE

## 2019-01-01 PROCEDURE — 99239 HOSP IP/OBS DSCHRG MGMT >30: CPT | Performed by: FAMILY MEDICINE

## 2019-01-01 PROCEDURE — 43246 EGD PLACE GASTROSTOMY TUBE: CPT | Performed by: INTERNAL MEDICINE

## 2019-01-01 PROCEDURE — 94664 DEMO&/EVAL PT USE INHALER: CPT

## 2019-01-01 PROCEDURE — 99215 OFFICE O/P EST HI 40 MIN: CPT | Performed by: PHYSICIAN ASSISTANT

## 2019-01-01 PROCEDURE — 84145 PROCALCITONIN (PCT): CPT | Performed by: NURSE PRACTITIONER

## 2019-01-01 PROCEDURE — 87631 RESP VIRUS 3-5 TARGETS: CPT | Performed by: NURSE PRACTITIONER

## 2019-01-01 PROCEDURE — 93306 TTE W/DOPPLER COMPLETE: CPT | Performed by: INTERNAL MEDICINE

## 2019-01-01 PROCEDURE — 85027 COMPLETE CBC AUTOMATED: CPT | Performed by: FAMILY MEDICINE

## 2019-01-01 PROCEDURE — 93306 TTE W/DOPPLER COMPLETE: CPT

## 2019-01-01 PROCEDURE — 80048 BASIC METABOLIC PNL TOTAL CA: CPT | Performed by: NURSE PRACTITIONER

## 2019-01-01 PROCEDURE — 84443 ASSAY THYROID STIM HORMONE: CPT | Performed by: NURSE PRACTITIONER

## 2019-01-01 PROCEDURE — 80076 HEPATIC FUNCTION PANEL: CPT | Performed by: NURSE PRACTITIONER

## 2019-01-01 PROCEDURE — 87077 CULTURE AEROBIC IDENTIFY: CPT | Performed by: NURSE PRACTITIONER

## 2019-01-01 PROCEDURE — 87449 NOS EACH ORGANISM AG IA: CPT | Performed by: NURSE PRACTITIONER

## 2019-01-01 PROCEDURE — 85049 AUTOMATED PLATELET COUNT: CPT | Performed by: INTERNAL MEDICINE

## 2019-01-01 PROCEDURE — 87040 BLOOD CULTURE FOR BACTERIA: CPT | Performed by: INTERNAL MEDICINE

## 2019-01-01 PROCEDURE — 84075 ASSAY ALKALINE PHOSPHATASE: CPT | Performed by: PHYSICIAN ASSISTANT

## 2019-01-01 PROCEDURE — 85610 PROTHROMBIN TIME: CPT | Performed by: INTERNAL MEDICINE

## 2019-01-01 PROCEDURE — 82977 ASSAY OF GGT: CPT | Performed by: PHYSICIAN ASSISTANT

## 2019-01-01 PROCEDURE — 83735 ASSAY OF MAGNESIUM: CPT | Performed by: FAMILY MEDICINE

## 2019-01-01 PROCEDURE — 80053 COMPREHEN METABOLIC PANEL: CPT | Performed by: EMERGENCY MEDICINE

## 2019-01-01 PROCEDURE — 84100 ASSAY OF PHOSPHORUS: CPT | Performed by: INTERNAL MEDICINE

## 2019-01-01 PROCEDURE — 84145 PROCALCITONIN (PCT): CPT | Performed by: FAMILY MEDICINE

## 2019-01-01 PROCEDURE — 84484 ASSAY OF TROPONIN QUANT: CPT | Performed by: NURSE PRACTITIONER

## 2019-01-01 PROCEDURE — 87449 NOS EACH ORGANISM AG IA: CPT | Performed by: INTERNAL MEDICINE

## 2019-01-01 PROCEDURE — 85730 THROMBOPLASTIN TIME PARTIAL: CPT | Performed by: EMERGENCY MEDICINE

## 2019-01-01 RX ORDER — DOCUSATE SODIUM 100 MG/1
100 CAPSULE, LIQUID FILLED ORAL 2 TIMES DAILY
Status: DISCONTINUED | OUTPATIENT
Start: 2019-01-01 | End: 2019-01-01 | Stop reason: HOSPADM

## 2019-01-01 RX ORDER — CARBIDOPA AND LEVODOPA 25; 100 MG/1; MG/1
2 TABLET, EXTENDED RELEASE ORAL 4 TIMES DAILY
Qty: 240 TABLET | Refills: 4 | Status: ON HOLD
Start: 2019-01-01 | End: 2019-01-01

## 2019-01-01 RX ORDER — POTASSIUM CHLORIDE 14.9 MG/ML
20 INJECTION INTRAVENOUS ONCE
Status: COMPLETED | OUTPATIENT
Start: 2019-01-01 | End: 2019-01-01

## 2019-01-01 RX ORDER — IPRATROPIUM BROMIDE AND ALBUTEROL SULFATE 2.5; .5 MG/3ML; MG/3ML
3 SOLUTION RESPIRATORY (INHALATION)
Status: DISCONTINUED | OUTPATIENT
Start: 2019-01-01 | End: 2019-01-01

## 2019-01-01 RX ORDER — GUAIFENESIN/DEXTROMETHORPHAN 100-10MG/5
10 SYRUP ORAL EVERY 4 HOURS PRN
Qty: 118 ML | Refills: 0 | Status: ON HOLD
Start: 2019-01-01 | End: 2019-01-01 | Stop reason: SDUPTHER

## 2019-01-01 RX ORDER — LACTOBACILLUS ACIDOPHILUS / LACTOBACILLUS BULGARICUS 100 MILLION CFU STRENGTH
1 GRANULES ORAL
Status: DISCONTINUED | OUTPATIENT
Start: 2019-01-01 | End: 2019-01-01 | Stop reason: HOSPADM

## 2019-01-01 RX ORDER — CEFAZOLIN SODIUM 2 G/50ML
2000 SOLUTION INTRAVENOUS ONCE
Status: COMPLETED | OUTPATIENT
Start: 2019-01-01 | End: 2019-01-01

## 2019-01-01 RX ORDER — LABETALOL 20 MG/4 ML (5 MG/ML) INTRAVENOUS SYRINGE
AS NEEDED
Status: DISCONTINUED | OUTPATIENT
Start: 2019-01-01 | End: 2019-01-01 | Stop reason: SURG

## 2019-01-01 RX ORDER — ONDANSETRON 2 MG/ML
4 INJECTION INTRAMUSCULAR; INTRAVENOUS EVERY 8 HOURS PRN
Status: DISCONTINUED | OUTPATIENT
Start: 2019-01-01 | End: 2019-01-01 | Stop reason: HOSPADM

## 2019-01-01 RX ORDER — LACTOBACILLUS ACIDOPHILUS / LACTOBACILLUS BULGARICUS 100 MILLION CFU STRENGTH
1 GRANULES ORAL
Qty: 270 PACKET | Refills: 0 | Status: SHIPPED | OUTPATIENT
Start: 2019-01-01

## 2019-01-01 RX ORDER — PROPOFOL 10 MG/ML
INJECTION, EMULSION INTRAVENOUS AS NEEDED
Status: DISCONTINUED | OUTPATIENT
Start: 2019-01-01 | End: 2019-01-01 | Stop reason: SURG

## 2019-01-01 RX ORDER — CEFTRIAXONE 1 G/50ML
1000 INJECTION, SOLUTION INTRAVENOUS EVERY 24 HOURS
Status: DISCONTINUED | OUTPATIENT
Start: 2019-01-01 | End: 2019-01-01

## 2019-01-01 RX ORDER — POLYVINYL ALCOHOL 14 MG/ML
1 SOLUTION/ DROPS OPHTHALMIC EVERY 4 HOURS PRN
Qty: 15 ML | Refills: 0
Start: 2019-01-01

## 2019-01-01 RX ORDER — LEVOTHYROXINE AND LIOTHYRONINE 9.5; 2.25 UG/1; UG/1
15 TABLET ORAL DAILY
Status: DISCONTINUED | OUTPATIENT
Start: 2019-01-01 | End: 2019-01-01 | Stop reason: HOSPADM

## 2019-01-01 RX ORDER — SENNOSIDES 8.8 MG/5ML
17.6 LIQUID ORAL
Status: DISCONTINUED | OUTPATIENT
Start: 2019-01-01 | End: 2019-01-01 | Stop reason: HOSPADM

## 2019-01-01 RX ORDER — GUAIFENESIN 600 MG
600 TABLET, EXTENDED RELEASE 12 HR ORAL EVERY 12 HOURS SCHEDULED
Status: DISCONTINUED | OUTPATIENT
Start: 2019-01-01 | End: 2019-01-01

## 2019-01-01 RX ORDER — ASPIRIN 81 MG/1
81 TABLET, CHEWABLE ORAL DAILY
Status: DISCONTINUED | OUTPATIENT
Start: 2019-01-01 | End: 2019-01-01 | Stop reason: HOSPADM

## 2019-01-01 RX ORDER — SODIUM CHLORIDE 9 MG/ML
40 INJECTION, SOLUTION INTRAVENOUS CONTINUOUS
Status: DISCONTINUED | OUTPATIENT
Start: 2019-01-01 | End: 2019-01-01

## 2019-01-01 RX ORDER — POLYVINYL ALCOHOL 14 MG/ML
1 SOLUTION/ DROPS OPHTHALMIC EVERY 4 HOURS PRN
Status: DISCONTINUED | OUTPATIENT
Start: 2019-01-01 | End: 2019-01-01 | Stop reason: HOSPADM

## 2019-01-01 RX ORDER — CARBIDOPA/LEVODOPA 25MG-250MG
0.5 TABLET ORAL ONCE
Status: COMPLETED | OUTPATIENT
Start: 2019-01-01 | End: 2019-01-01

## 2019-01-01 RX ORDER — MINERAL OIL AND PETROLATUM 150; 830 MG/G; MG/G
OINTMENT OPHTHALMIC
Status: DISCONTINUED | OUTPATIENT
Start: 2019-01-01 | End: 2019-01-01

## 2019-01-01 RX ORDER — LANOLIN ALCOHOL/MO/W.PET/CERES
3 CREAM (GRAM) TOPICAL
Qty: 90 TABLET | Refills: 0 | Status: SHIPPED | OUTPATIENT
Start: 2019-01-01

## 2019-01-01 RX ORDER — SCOLOPAMINE TRANSDERMAL SYSTEM 1 MG/1
1 PATCH, EXTENDED RELEASE TRANSDERMAL
Status: DISCONTINUED | OUTPATIENT
Start: 2019-01-01 | End: 2019-01-01 | Stop reason: HOSPADM

## 2019-01-01 RX ORDER — LEVOTHYROXINE AND LIOTHYRONINE 19; 4.5 UG/1; UG/1
15 TABLET ORAL DAILY
Status: DISCONTINUED | OUTPATIENT
Start: 2019-01-01 | End: 2019-01-01 | Stop reason: CLARIF

## 2019-01-01 RX ORDER — ASPIRIN 325 MG
325 TABLET ORAL ONCE
Status: COMPLETED | OUTPATIENT
Start: 2019-01-01 | End: 2019-01-01

## 2019-01-01 RX ORDER — ACETYLCYSTEINE 200 MG/ML
3 SOLUTION ORAL; RESPIRATORY (INHALATION) EVERY 6 HOURS PRN
Qty: 360 ML | Refills: 0 | Status: SHIPPED | OUTPATIENT
Start: 2019-01-01

## 2019-01-01 RX ORDER — POTASSIUM CHLORIDE 29.8 MG/ML
40 INJECTION INTRAVENOUS ONCE
Status: DISCONTINUED | OUTPATIENT
Start: 2019-01-01 | End: 2019-01-01

## 2019-01-01 RX ORDER — DOCUSATE SODIUM 100 MG/1
100 CAPSULE, LIQUID FILLED ORAL 2 TIMES DAILY
Status: DISCONTINUED | OUTPATIENT
Start: 2019-01-01 | End: 2019-01-01

## 2019-01-01 RX ORDER — MULTIVIT WITH IRON,MINERALS
15 LIQUID (ML) ORAL DAILY
Status: DISCONTINUED | OUTPATIENT
Start: 2019-01-01 | End: 2019-01-01

## 2019-01-01 RX ORDER — GUAIFENESIN/DEXTROMETHORPHAN 100-10MG/5
5 SYRUP ORAL EVERY 4 HOURS PRN
Status: DISCONTINUED | OUTPATIENT
Start: 2019-01-01 | End: 2019-01-01 | Stop reason: HOSPADM

## 2019-01-01 RX ORDER — POTASSIUM CHLORIDE 29.8 MG/ML
40 INJECTION INTRAVENOUS ONCE
Status: DISCONTINUED | OUTPATIENT
Start: 2019-01-01 | End: 2019-01-01 | Stop reason: CLARIF

## 2019-01-01 RX ORDER — GUAIFENESIN/DEXTROMETHORPHAN 100-10MG/5
10 SYRUP ORAL EVERY 4 HOURS PRN
Status: DISCONTINUED | OUTPATIENT
Start: 2019-01-01 | End: 2019-01-01

## 2019-01-01 RX ORDER — SODIUM CHLORIDE, SODIUM LACTATE, POTASSIUM CHLORIDE, CALCIUM CHLORIDE 600; 310; 30; 20 MG/100ML; MG/100ML; MG/100ML; MG/100ML
INJECTION, SOLUTION INTRAVENOUS CONTINUOUS PRN
Status: DISCONTINUED | OUTPATIENT
Start: 2019-01-01 | End: 2019-01-01 | Stop reason: SURG

## 2019-01-01 RX ORDER — LEVOTHYROXINE AND LIOTHYRONINE 9.5; 2.25 UG/1; UG/1
15 TABLET ORAL DAILY
Status: DISCONTINUED | OUTPATIENT
Start: 2019-01-01 | End: 2019-01-01

## 2019-01-01 RX ORDER — AZELASTINE 1 MG/ML
1 SPRAY, METERED NASAL 2 TIMES DAILY
Status: DISCONTINUED | OUTPATIENT
Start: 2019-01-01 | End: 2019-01-01 | Stop reason: HOSPADM

## 2019-01-01 RX ORDER — HYDRALAZINE HYDROCHLORIDE 20 MG/ML
5 INJECTION INTRAMUSCULAR; INTRAVENOUS EVERY 6 HOURS PRN
Status: DISCONTINUED | OUTPATIENT
Start: 2019-01-01 | End: 2019-01-01 | Stop reason: HOSPADM

## 2019-01-01 RX ORDER — FENTANYL CITRATE 50 UG/ML
INJECTION, SOLUTION INTRAMUSCULAR; INTRAVENOUS AS NEEDED
Status: DISCONTINUED | OUTPATIENT
Start: 2019-01-01 | End: 2019-01-01 | Stop reason: SURG

## 2019-01-01 RX ORDER — PROPOFOL 10 MG/ML
INJECTION, EMULSION INTRAVENOUS CONTINUOUS PRN
Status: DISCONTINUED | OUTPATIENT
Start: 2019-01-01 | End: 2019-01-01 | Stop reason: SURG

## 2019-01-01 RX ORDER — ACETAMINOPHEN 325 MG/1
650 TABLET ORAL EVERY 6 HOURS PRN
Status: DISCONTINUED | OUTPATIENT
Start: 2019-01-01 | End: 2019-01-01

## 2019-01-01 RX ORDER — DOCUSATE SODIUM 100 MG/1
100 CAPSULE, LIQUID FILLED ORAL 2 TIMES DAILY
Qty: 10 CAPSULE | Refills: 0
Start: 2019-01-01 | End: 2019-01-01 | Stop reason: HOSPADM

## 2019-01-01 RX ORDER — ASCORBIC ACID 500 MG
500 TABLET ORAL DAILY
Status: DISCONTINUED | OUTPATIENT
Start: 2019-01-01 | End: 2019-01-01 | Stop reason: HOSPADM

## 2019-01-01 RX ORDER — CARBIDOPA AND LEVODOPA 25; 100 MG/1; MG/1
2 TABLET, EXTENDED RELEASE ORAL 4 TIMES DAILY
Status: DISCONTINUED | OUTPATIENT
Start: 2019-01-01 | End: 2019-01-01

## 2019-01-01 RX ORDER — AZITHROMYCIN 250 MG/1
250 TABLET, FILM COATED ORAL EVERY 24 HOURS
Status: DISCONTINUED | OUTPATIENT
Start: 2019-01-01 | End: 2019-01-01

## 2019-01-01 RX ORDER — MULTIVIT WITH IRON,MINERALS
15 LIQUID (ML) ORAL DAILY
Qty: 1350 ML | Refills: 0 | Status: SHIPPED | OUTPATIENT
Start: 2019-01-01

## 2019-01-01 RX ORDER — LANOLIN ALCOHOL/MO/W.PET/CERES
3 CREAM (GRAM) TOPICAL
Status: DISCONTINUED | OUTPATIENT
Start: 2019-01-01 | End: 2019-01-01 | Stop reason: HOSPADM

## 2019-01-01 RX ORDER — LEVOTHYROXINE AND LIOTHYRONINE 19; 4.5 UG/1; UG/1
15 TABLET ORAL DAILY
Refills: 0
Start: 2019-01-01

## 2019-01-01 RX ORDER — IPRATROPIUM BROMIDE AND ALBUTEROL SULFATE 2.5; .5 MG/3ML; MG/3ML
3 SOLUTION RESPIRATORY (INHALATION) 3 TIMES DAILY
Status: DISCONTINUED | OUTPATIENT
Start: 2019-01-01 | End: 2019-01-01 | Stop reason: HOSPADM

## 2019-01-01 RX ORDER — ACETYLCYSTEINE 200 MG/ML
3 SOLUTION ORAL; RESPIRATORY (INHALATION)
Status: DISCONTINUED | OUTPATIENT
Start: 2019-01-01 | End: 2019-01-01

## 2019-01-01 RX ORDER — DEXTROSE, SODIUM CHLORIDE, AND POTASSIUM CHLORIDE 5; .45; .15 G/100ML; G/100ML; G/100ML
50 INJECTION INTRAVENOUS CONTINUOUS
Status: DISCONTINUED | OUTPATIENT
Start: 2019-01-01 | End: 2019-01-01

## 2019-01-01 RX ORDER — ALBUTEROL SULFATE 2.5 MG/3ML
2.5 SOLUTION RESPIRATORY (INHALATION) EVERY 4 HOURS PRN
Status: DISCONTINUED | OUTPATIENT
Start: 2019-01-01 | End: 2019-01-01 | Stop reason: HOSPADM

## 2019-01-01 RX ORDER — SACCHAROMYCES BOULARDII 250 MG
250 CAPSULE ORAL 2 TIMES DAILY
Refills: 0
Start: 2019-01-01 | End: 2019-01-01 | Stop reason: HOSPADM

## 2019-01-01 RX ORDER — SODIUM CHLORIDE, SODIUM LACTATE, POTASSIUM CHLORIDE, CALCIUM CHLORIDE 600; 310; 30; 20 MG/100ML; MG/100ML; MG/100ML; MG/100ML
125 INJECTION, SOLUTION INTRAVENOUS CONTINUOUS
Status: DISCONTINUED | OUTPATIENT
Start: 2019-01-01 | End: 2019-01-01

## 2019-01-01 RX ORDER — MULTIVIT WITH IRON,MINERALS
15 LIQUID (ML) ORAL DAILY
Status: DISCONTINUED | OUTPATIENT
Start: 2019-01-01 | End: 2019-01-01 | Stop reason: HOSPADM

## 2019-01-01 RX ORDER — SACCHAROMYCES BOULARDII 250 MG
250 CAPSULE ORAL 2 TIMES DAILY
Status: DISCONTINUED | OUTPATIENT
Start: 2019-01-01 | End: 2019-01-01

## 2019-01-01 RX ORDER — LACTOSE-REDUCED FOOD/FIBER
240 LIQUID (ML) ORAL 4 TIMES DAILY
Refills: 0
Start: 2019-01-01

## 2019-01-01 RX ORDER — LACTOBACILLUS ACIDOPHILUS / LACTOBACILLUS BULGARICUS 100 MILLION CFU STRENGTH
1 GRANULES ORAL
Status: DISCONTINUED | OUTPATIENT
Start: 2019-01-01 | End: 2019-01-01

## 2019-01-01 RX ORDER — ACETYLCYSTEINE 200 MG/ML
3 SOLUTION ORAL; RESPIRATORY (INHALATION) EVERY 6 HOURS PRN
Status: DISCONTINUED | OUTPATIENT
Start: 2019-01-01 | End: 2019-01-01 | Stop reason: HOSPADM

## 2019-01-01 RX ORDER — FAMOTIDINE 40 MG/5ML
20 POWDER, FOR SUSPENSION ORAL DAILY
Qty: 225 ML | Refills: 0 | Status: SHIPPED | OUTPATIENT
Start: 2019-01-01

## 2019-01-01 RX ORDER — AMOXICILLIN 250 MG
1 CAPSULE ORAL 2 TIMES DAILY
Qty: 180 TABLET | Refills: 0 | Status: SHIPPED | OUTPATIENT
Start: 2019-01-01

## 2019-01-01 RX ORDER — ACETAMINOPHEN 325 MG/1
650 TABLET ORAL EVERY 6 HOURS PRN
Refills: 0
Start: 2019-01-01

## 2019-01-01 RX ORDER — LEVOTHYROXINE SODIUM 0.03 MG/1
25 TABLET ORAL
Status: DISCONTINUED | OUTPATIENT
Start: 2019-01-01 | End: 2019-01-01 | Stop reason: HOSPADM

## 2019-01-01 RX ORDER — ACETAMINOPHEN 325 MG/1
650 TABLET ORAL EVERY 6 HOURS PRN
Status: DISCONTINUED | OUTPATIENT
Start: 2019-01-01 | End: 2019-01-01 | Stop reason: HOSPADM

## 2019-01-01 RX ORDER — IPRATROPIUM BROMIDE AND ALBUTEROL SULFATE 2.5; .5 MG/3ML; MG/3ML
3 SOLUTION RESPIRATORY (INHALATION) 3 TIMES DAILY
Qty: 270 ML | Refills: 2 | Status: SHIPPED | OUTPATIENT
Start: 2019-01-01

## 2019-01-01 RX ORDER — SODIUM CHLORIDE, SODIUM GLUCONATE, SODIUM ACETATE, POTASSIUM CHLORIDE, MAGNESIUM CHLORIDE, SODIUM PHOSPHATE, DIBASIC, AND POTASSIUM PHOSPHATE .53; .5; .37; .037; .03; .012; .00082 G/100ML; G/100ML; G/100ML; G/100ML; G/100ML; G/100ML; G/100ML
50 INJECTION, SOLUTION INTRAVENOUS CONTINUOUS
Status: DISCONTINUED | OUTPATIENT
Start: 2019-01-01 | End: 2019-01-01

## 2019-01-01 RX ORDER — IPRATROPIUM BROMIDE AND ALBUTEROL SULFATE 2.5; .5 MG/3ML; MG/3ML
3 SOLUTION RESPIRATORY (INHALATION)
Status: DISCONTINUED | OUTPATIENT
Start: 2019-01-01 | End: 2019-01-01 | Stop reason: HOSPADM

## 2019-01-01 RX ORDER — CARBIDOPA AND LEVODOPA 25; 100 MG/1; MG/1
2 TABLET, EXTENDED RELEASE ORAL ONCE
Status: COMPLETED | OUTPATIENT
Start: 2019-01-01 | End: 2019-01-01

## 2019-01-01 RX ORDER — AMOXICILLIN 250 MG
1 CAPSULE ORAL 2 TIMES DAILY
Status: DISCONTINUED | OUTPATIENT
Start: 2019-01-01 | End: 2019-01-01 | Stop reason: HOSPADM

## 2019-01-01 RX ORDER — DILTIAZEM HYDROCHLORIDE 5 MG/ML
0.35 INJECTION INTRAVENOUS ONCE
Status: COMPLETED | OUTPATIENT
Start: 2019-01-01 | End: 2019-01-01

## 2019-01-01 RX ORDER — SACCHAROMYCES BOULARDII 250 MG
250 CAPSULE ORAL 2 TIMES DAILY
Status: DISCONTINUED | OUTPATIENT
Start: 2019-01-01 | End: 2019-01-01 | Stop reason: HOSPADM

## 2019-01-01 RX ORDER — NEBULIZER ACCESSORIES
KIT MISCELLANEOUS
COMMUNITY

## 2019-01-01 RX ORDER — CARBIDOPA AND LEVODOPA 25; 100 MG/1; MG/1
2 TABLET, EXTENDED RELEASE ORAL 3 TIMES DAILY
Refills: 0
Start: 2019-01-01 | End: 2019-01-01 | Stop reason: SDUPTHER

## 2019-01-01 RX ORDER — ASPIRIN 81 MG/1
81 TABLET, CHEWABLE ORAL DAILY
Refills: 0
Start: 2019-01-01 | End: 2019-01-01

## 2019-01-01 RX ORDER — ACETAMINOPHEN 160 MG/5ML
650 SUSPENSION, ORAL (FINAL DOSE FORM) ORAL EVERY 6 HOURS PRN
Status: DISCONTINUED | OUTPATIENT
Start: 2019-01-01 | End: 2019-01-01 | Stop reason: HOSPADM

## 2019-01-01 RX ORDER — CEFTRIAXONE 1 G/50ML
1000 INJECTION, SOLUTION INTRAVENOUS ONCE
Status: COMPLETED | OUTPATIENT
Start: 2019-01-01 | End: 2019-01-01

## 2019-01-01 RX ORDER — BISACODYL 10 MG
10 SUPPOSITORY, RECTAL RECTAL DAILY PRN
Qty: 12 SUPPOSITORY | Refills: 0
Start: 2019-01-01

## 2019-01-01 RX ORDER — BISACODYL 10 MG
10 SUPPOSITORY, RECTAL RECTAL DAILY PRN
Status: DISCONTINUED | OUTPATIENT
Start: 2019-01-01 | End: 2019-01-01 | Stop reason: HOSPADM

## 2019-01-01 RX ORDER — MULTIVIT WITH MINERALS/LUTEIN
250 TABLET ORAL DAILY
Status: DISCONTINUED | OUTPATIENT
Start: 2019-01-01 | End: 2019-01-01

## 2019-01-01 RX ORDER — IPRATROPIUM BROMIDE AND ALBUTEROL SULFATE 2.5; .5 MG/3ML; MG/3ML
3 SOLUTION RESPIRATORY (INHALATION) ONCE
Status: COMPLETED | OUTPATIENT
Start: 2019-01-01 | End: 2019-01-01

## 2019-01-01 RX ORDER — CARBIDOPA AND LEVODOPA 25; 100 MG/1; MG/1
1 TABLET, EXTENDED RELEASE ORAL ONCE
Status: DISCONTINUED | OUTPATIENT
Start: 2019-01-01 | End: 2019-01-01

## 2019-01-01 RX ORDER — POTASSIUM CHLORIDE 14.9 MG/ML
20 INJECTION INTRAVENOUS ONCE
Status: DISCONTINUED | OUTPATIENT
Start: 2019-01-01 | End: 2019-01-01

## 2019-01-01 RX ORDER — OMEGA-3S/DHA/EPA/FISH OIL/D3 300MG-1000
400 CAPSULE ORAL DAILY
Status: DISCONTINUED | OUTPATIENT
Start: 2019-01-01 | End: 2019-01-01

## 2019-01-01 RX ORDER — ONDANSETRON 2 MG/ML
4 INJECTION INTRAMUSCULAR; INTRAVENOUS EVERY 6 HOURS PRN
Status: DISCONTINUED | OUTPATIENT
Start: 2019-01-01 | End: 2019-01-01 | Stop reason: HOSPADM

## 2019-01-01 RX ORDER — SODIUM CHLORIDE, SODIUM LACTATE, POTASSIUM CHLORIDE, CALCIUM CHLORIDE 600; 310; 30; 20 MG/100ML; MG/100ML; MG/100ML; MG/100ML
75 INJECTION, SOLUTION INTRAVENOUS CONTINUOUS
Status: DISCONTINUED | OUTPATIENT
Start: 2019-01-01 | End: 2019-01-01

## 2019-01-01 RX ORDER — LIDOCAINE HYDROCHLORIDE 20 MG/ML
INJECTION, SOLUTION INFILTRATION; PERINEURAL AS NEEDED
Status: DISCONTINUED | OUTPATIENT
Start: 2019-01-01 | End: 2019-01-01 | Stop reason: SURG

## 2019-01-01 RX ORDER — CHOLECALCIFEROL (VITAMIN D3) 125 MCG
1000 CAPSULE ORAL DAILY
Status: DISCONTINUED | OUTPATIENT
Start: 2019-01-01 | End: 2019-01-01 | Stop reason: HOSPADM

## 2019-01-01 RX ORDER — KETOROLAC TROMETHAMINE 30 MG/ML
15 INJECTION, SOLUTION INTRAMUSCULAR; INTRAVENOUS ONCE
Status: COMPLETED | OUTPATIENT
Start: 2019-01-01 | End: 2019-01-01

## 2019-01-01 RX ORDER — GUAIFENESIN/DEXTROMETHORPHAN 100-10MG/5
5 SYRUP ORAL EVERY 4 HOURS PRN
Status: DISCONTINUED | OUTPATIENT
Start: 2019-01-01 | End: 2019-01-01

## 2019-01-01 RX ORDER — GUAIFENESIN/DEXTROMETHORPHAN 100-10MG/5
5 SYRUP ORAL EVERY 4 HOURS PRN
Qty: 118 ML | Refills: 0
Start: 2019-01-01

## 2019-01-01 RX ORDER — BENZONATATE 100 MG/1
100 CAPSULE ORAL EVERY 8 HOURS
Qty: 12 CAPSULE | Refills: 0 | Status: SHIPPED | OUTPATIENT
Start: 2019-01-01 | End: 2019-01-01

## 2019-01-01 RX ORDER — LANOLIN ALCOHOL/MO/W.PET/CERES
3 CREAM (GRAM) TOPICAL
Status: DISCONTINUED | OUTPATIENT
Start: 2019-01-01 | End: 2019-01-01

## 2019-01-01 RX ORDER — CARBIDOPA AND LEVODOPA 25; 100 MG/1; MG/1
2 TABLET, EXTENDED RELEASE ORAL 3 TIMES DAILY
Status: DISCONTINUED | OUTPATIENT
Start: 2019-01-01 | End: 2019-01-01 | Stop reason: HOSPADM

## 2019-01-01 RX ORDER — OMEGA-3S/DHA/EPA/FISH OIL/D3 300MG-1000
400 CAPSULE ORAL DAILY
Status: DISCONTINUED | OUTPATIENT
Start: 2019-01-01 | End: 2019-01-01 | Stop reason: SDUPTHER

## 2019-01-01 RX ORDER — GUAIFENESIN/DEXTROMETHORPHAN 100-10MG/5
10 SYRUP ORAL EVERY 4 HOURS PRN
Status: DISCONTINUED | OUTPATIENT
Start: 2019-01-01 | End: 2019-01-01 | Stop reason: HOSPADM

## 2019-01-01 RX ORDER — ALBUTEROL SULFATE 2.5 MG/3ML
2.5 SOLUTION RESPIRATORY (INHALATION) ONCE
Status: COMPLETED | OUTPATIENT
Start: 2019-01-01 | End: 2019-01-01

## 2019-01-01 RX ORDER — POLYVINYL ALCOHOL 14 MG/ML
1 SOLUTION/ DROPS OPHTHALMIC AS NEEDED
Status: DISCONTINUED | OUTPATIENT
Start: 2019-01-01 | End: 2019-01-01 | Stop reason: SDUPTHER

## 2019-01-01 RX ORDER — SCOLOPAMINE TRANSDERMAL SYSTEM 1 MG/1
1 PATCH, EXTENDED RELEASE TRANSDERMAL
Qty: 30 PATCH | Refills: 0 | Status: SHIPPED | OUTPATIENT
Start: 2019-01-01

## 2019-01-01 RX ADMIN — CARBIDOPA AND LEVODOPA 0.5 TABLET: 25; 100 TABLET ORAL at 22:02

## 2019-01-01 RX ADMIN — METRONIDAZOLE 500 MG: 500 INJECTION, SOLUTION INTRAVENOUS at 06:05

## 2019-01-01 RX ADMIN — GUAIFENESIN 600 MG: 600 TABLET, EXTENDED RELEASE ORAL at 08:37

## 2019-01-01 RX ADMIN — GUAIFENESIN AND DEXTROMETHORPHAN 2.5 ML: 100; 10 SYRUP ORAL at 09:58

## 2019-01-01 RX ADMIN — AZITHROMYCIN MONOHYDRATE 250 MG: 250 TABLET ORAL at 16:31

## 2019-01-01 RX ADMIN — CARBIDOPA AND LEVODOPA 2 TABLET: 25; 100 TABLET, EXTENDED RELEASE ORAL at 11:37

## 2019-01-01 RX ADMIN — ENOXAPARIN SODIUM 40 MG: 40 INJECTION SUBCUTANEOUS at 08:00

## 2019-01-01 RX ADMIN — ASPIRIN 325 MG: 325 TABLET ORAL at 18:42

## 2019-01-01 RX ADMIN — METRONIDAZOLE 500 MG: 500 INJECTION, SOLUTION INTRAVENOUS at 11:07

## 2019-01-01 RX ADMIN — LACTOBACILLUS ACIDOPHILUS / LACTOBACILLUS BULGARICUS 1 PACKET: 100 MILLION CFU STRENGTH GRANULES at 12:22

## 2019-01-01 RX ADMIN — CARBIDOPA AND LEVODOPA 0.5 TABLET: 25; 100 TABLET ORAL at 08:41

## 2019-01-01 RX ADMIN — LEVOTHYROXINE SODIUM 25 MCG: 25 TABLET ORAL at 08:31

## 2019-01-01 RX ADMIN — CARBIDOPA AND LEVODOPA 1 TABLET: 25; 100 TABLET ORAL at 16:15

## 2019-01-01 RX ADMIN — ACETYLCYSTEINE 600 MG: 200 SOLUTION ORAL; RESPIRATORY (INHALATION) at 19:41

## 2019-01-01 RX ADMIN — CARBIDOPA AND LEVODOPA 0.5 TABLET: 25; 100 TABLET ORAL at 07:59

## 2019-01-01 RX ADMIN — IPRATROPIUM BROMIDE AND ALBUTEROL SULFATE 3 ML: 2.5; .5 SOLUTION RESPIRATORY (INHALATION) at 14:19

## 2019-01-01 RX ADMIN — IPRATROPIUM BROMIDE AND ALBUTEROL SULFATE 3 ML: 2.5; .5 SOLUTION RESPIRATORY (INHALATION) at 20:25

## 2019-01-01 RX ADMIN — ACETYLCYSTEINE 600 MG: 200 SOLUTION ORAL; RESPIRATORY (INHALATION) at 20:50

## 2019-01-01 RX ADMIN — CARBIDOPA AND LEVODOPA 2 TABLET: 25; 100 TABLET, EXTENDED RELEASE ORAL at 12:00

## 2019-01-01 RX ADMIN — IPRATROPIUM BROMIDE AND ALBUTEROL SULFATE 3 ML: 2.5; .5 SOLUTION RESPIRATORY (INHALATION) at 08:34

## 2019-01-01 RX ADMIN — CARBIDOPA AND LEVODOPA 2 TABLET: 25; 100 TABLET, EXTENDED RELEASE ORAL at 15:50

## 2019-01-01 RX ADMIN — CARBIDOPA AND LEVODOPA 1 TABLET: 25; 100 TABLET ORAL at 16:34

## 2019-01-01 RX ADMIN — AZELASTINE HYDROCHLORIDE 1 SPRAY: 137 SPRAY, METERED NASAL at 08:47

## 2019-01-01 RX ADMIN — CARBIDOPA AND LEVODOPA 1 TABLET: 25; 100 TABLET ORAL at 08:38

## 2019-01-01 RX ADMIN — IPRATROPIUM BROMIDE AND ALBUTEROL SULFATE 3 ML: 2.5; .5 SOLUTION RESPIRATORY (INHALATION) at 09:29

## 2019-01-01 RX ADMIN — Medication 250 MG: at 08:32

## 2019-01-01 RX ADMIN — CARBIDOPA AND LEVODOPA 2 TABLET: 25; 100 TABLET, EXTENDED RELEASE ORAL at 22:04

## 2019-01-01 RX ADMIN — ENOXAPARIN SODIUM 40 MG: 40 INJECTION SUBCUTANEOUS at 08:42

## 2019-01-01 RX ADMIN — PROGESTERONE 200 MG: 200 CAPSULE ORAL at 21:17

## 2019-01-01 RX ADMIN — IPRATROPIUM BROMIDE AND ALBUTEROL SULFATE 3 ML: 2.5; .5 SOLUTION RESPIRATORY (INHALATION) at 02:28

## 2019-01-01 RX ADMIN — MULTIVITAMIN 15 ML: LIQUID ORAL at 08:46

## 2019-01-01 RX ADMIN — CARBIDOPA AND LEVODOPA 1 TABLET: 25; 100 TABLET ORAL at 12:38

## 2019-01-01 RX ADMIN — CARBIDOPA AND LEVODOPA 2 TABLET: 25; 100 TABLET, EXTENDED RELEASE ORAL at 17:31

## 2019-01-01 RX ADMIN — IPRATROPIUM BROMIDE AND ALBUTEROL SULFATE 3 ML: 2.5; .5 SOLUTION RESPIRATORY (INHALATION) at 20:49

## 2019-01-01 RX ADMIN — ENOXAPARIN SODIUM 30 MG: 30 INJECTION SUBCUTANEOUS at 12:32

## 2019-01-01 RX ADMIN — METRONIDAZOLE 500 MG: 500 INJECTION, SOLUTION INTRAVENOUS at 11:20

## 2019-01-01 RX ADMIN — Medication 1 TABLET: at 10:22

## 2019-01-01 RX ADMIN — SENNOSIDES AND DOCUSATE SODIUM 1 TABLET: 8.6; 5 TABLET ORAL at 17:41

## 2019-01-01 RX ADMIN — AZELASTINE HYDROCHLORIDE 1 SPRAY: 137 SPRAY, METERED NASAL at 08:42

## 2019-01-01 RX ADMIN — IPRATROPIUM BROMIDE AND ALBUTEROL SULFATE 3 ML: 2.5; .5 SOLUTION RESPIRATORY (INHALATION) at 01:24

## 2019-01-01 RX ADMIN — METRONIDAZOLE 500 MG: 500 INJECTION, SOLUTION INTRAVENOUS at 02:40

## 2019-01-01 RX ADMIN — ACETYLCYSTEINE 600 MG: 200 SOLUTION ORAL; RESPIRATORY (INHALATION) at 07:19

## 2019-01-01 RX ADMIN — IPRATROPIUM BROMIDE AND ALBUTEROL SULFATE 3 ML: 2.5; .5 SOLUTION RESPIRATORY (INHALATION) at 19:22

## 2019-01-01 RX ADMIN — DOCUSATE SODIUM 100 MG: 100 CAPSULE, LIQUID FILLED ORAL at 17:29

## 2019-01-01 RX ADMIN — OXYCODONE HYDROCHLORIDE AND ACETAMINOPHEN 500 MG: 500 TABLET ORAL at 08:32

## 2019-01-01 RX ADMIN — FAMOTIDINE 20 MG: 10 INJECTION, SOLUTION INTRAVENOUS at 08:31

## 2019-01-01 RX ADMIN — SODIUM CHLORIDE, SODIUM LACTATE, POTASSIUM CHLORIDE, AND CALCIUM CHLORIDE 75 ML/HR: .6; .31; .03; .02 INJECTION, SOLUTION INTRAVENOUS at 15:36

## 2019-01-01 RX ADMIN — CARBIDOPA AND LEVODOPA 2 TABLET: 25; 100 TABLET, EXTENDED RELEASE ORAL at 13:02

## 2019-01-01 RX ADMIN — SODIUM CHLORIDE, SODIUM GLUCONATE, SODIUM ACETATE, POTASSIUM CHLORIDE AND MAGNESIUM CHLORIDE 50 ML/HR: 526; 502; 368; 37; 30 INJECTION, SOLUTION INTRAVENOUS at 16:53

## 2019-01-01 RX ADMIN — POLYVINYL ALCOHOL 1 DROP: 14 SOLUTION/ DROPS OPHTHALMIC at 16:35

## 2019-01-01 RX ADMIN — GUAIFENESIN AND DEXTROMETHORPHAN 5 ML: 100; 10 SYRUP ORAL at 20:33

## 2019-01-01 RX ADMIN — METRONIDAZOLE 500 MG: 500 INJECTION, SOLUTION INTRAVENOUS at 15:00

## 2019-01-01 RX ADMIN — MULTIVITAMIN 15 ML: LIQUID ORAL at 14:05

## 2019-01-01 RX ADMIN — CARBIDOPA AND LEVODOPA 1 TABLET: 25; 100 TABLET ORAL at 16:27

## 2019-01-01 RX ADMIN — CYANOCOBALAMIN TAB 500 MCG 1000 MCG: 500 TAB at 08:41

## 2019-01-01 RX ADMIN — SCOPALAMINE 1 PATCH: 1 PATCH, EXTENDED RELEASE TRANSDERMAL at 15:22

## 2019-01-01 RX ADMIN — IPRATROPIUM BROMIDE AND ALBUTEROL SULFATE 3 ML: 2.5; .5 SOLUTION RESPIRATORY (INHALATION) at 19:41

## 2019-01-01 RX ADMIN — METRONIDAZOLE 500 MG: 500 INJECTION, SOLUTION INTRAVENOUS at 02:37

## 2019-01-01 RX ADMIN — DEXTROSE, SODIUM CHLORIDE, AND POTASSIUM CHLORIDE 50 ML/HR: 5; .45; .15 INJECTION INTRAVENOUS at 18:58

## 2019-01-01 RX ADMIN — SODIUM CHLORIDE, SODIUM GLUCONATE, SODIUM ACETATE, POTASSIUM CHLORIDE AND MAGNESIUM CHLORIDE 50 ML/HR: 526; 502; 368; 37; 30 INJECTION, SOLUTION INTRAVENOUS at 17:45

## 2019-01-01 RX ADMIN — CARBIDOPA AND LEVODOPA 1 TABLET: 25; 100 TABLET ORAL at 16:31

## 2019-01-01 RX ADMIN — LACTOBACILLUS ACIDOPHILUS / LACTOBACILLUS BULGARICUS 1 PACKET: 100 MILLION CFU STRENGTH GRANULES at 12:41

## 2019-01-01 RX ADMIN — IPRATROPIUM BROMIDE AND ALBUTEROL SULFATE 3 ML: 2.5; .5 SOLUTION RESPIRATORY (INHALATION) at 20:50

## 2019-01-01 RX ADMIN — IPRATROPIUM BROMIDE AND ALBUTEROL SULFATE 3 ML: 2.5; .5 SOLUTION RESPIRATORY (INHALATION) at 20:23

## 2019-01-01 RX ADMIN — CARBIDOPA AND LEVODOPA 2 TABLET: 25; 100 TABLET, EXTENDED RELEASE ORAL at 16:30

## 2019-01-01 RX ADMIN — CARBIDOPA AND LEVODOPA 0.5 TABLET: 25; 100 TABLET ORAL at 08:05

## 2019-01-01 RX ADMIN — METRONIDAZOLE 500 MG: 500 INJECTION, SOLUTION INTRAVENOUS at 22:06

## 2019-01-01 RX ADMIN — ENOXAPARIN SODIUM 30 MG: 30 INJECTION SUBCUTANEOUS at 08:19

## 2019-01-01 RX ADMIN — Medication 250 MG: at 08:31

## 2019-01-01 RX ADMIN — CYANOCOBALAMIN TAB 500 MCG 1000 MCG: 500 TAB at 08:37

## 2019-01-01 RX ADMIN — CARBIDOPA AND LEVODOPA 2 TABLET: 25; 100 TABLET, EXTENDED RELEASE ORAL at 12:22

## 2019-01-01 RX ADMIN — CARBIDOPA AND LEVODOPA 2 TABLET: 25; 100 TABLET, EXTENDED RELEASE ORAL at 08:45

## 2019-01-01 RX ADMIN — SODIUM CHLORIDE, SODIUM LACTATE, POTASSIUM CHLORIDE, AND CALCIUM CHLORIDE: .6; .31; .03; .02 INJECTION, SOLUTION INTRAVENOUS at 16:10

## 2019-01-01 RX ADMIN — CEFTRIAXONE 1000 MG: 1 INJECTION, SOLUTION INTRAVENOUS at 16:15

## 2019-01-01 RX ADMIN — CARBIDOPA AND LEVODOPA 2 TABLET: 25; 100 TABLET, EXTENDED RELEASE ORAL at 14:47

## 2019-01-01 RX ADMIN — POTASSIUM CHLORIDE 20 MEQ: 200 INJECTION, SOLUTION INTRAVENOUS at 10:59

## 2019-01-01 RX ADMIN — CYANOCOBALAMIN TAB 500 MCG 1000 MCG: 500 TAB at 08:32

## 2019-01-01 RX ADMIN — CARBIDOPA AND LEVODOPA 1 TABLET: 25; 100 TABLET ORAL at 17:32

## 2019-01-01 RX ADMIN — Medication 1 TABLET: at 11:36

## 2019-01-01 RX ADMIN — IPRATROPIUM BROMIDE AND ALBUTEROL SULFATE 3 ML: 2.5; .5 SOLUTION RESPIRATORY (INHALATION) at 13:23

## 2019-01-01 RX ADMIN — CARBIDOPA AND LEVODOPA 1 TABLET: 25; 100 TABLET ORAL at 12:00

## 2019-01-01 RX ADMIN — IPRATROPIUM BROMIDE AND ALBUTEROL SULFATE 3 ML: 2.5; .5 SOLUTION RESPIRATORY (INHALATION) at 14:08

## 2019-01-01 RX ADMIN — SODIUM CHLORIDE 40 ML/HR: 0.9 INJECTION, SOLUTION INTRAVENOUS at 06:04

## 2019-01-01 RX ADMIN — GUAIFENESIN 600 MG: 600 TABLET, EXTENDED RELEASE ORAL at 21:38

## 2019-01-01 RX ADMIN — CARBIDOPA AND LEVODOPA 2 TABLET: 25; 100 TABLET, EXTENDED RELEASE ORAL at 20:33

## 2019-01-01 RX ADMIN — CARBIDOPA AND LEVODOPA 2 TABLET: 25; 100 TABLET, EXTENDED RELEASE ORAL at 13:22

## 2019-01-01 RX ADMIN — ACETYLCYSTEINE 600 MG: 200 SOLUTION ORAL; RESPIRATORY (INHALATION) at 13:24

## 2019-01-01 RX ADMIN — Medication 250 MG: at 08:33

## 2019-01-01 RX ADMIN — METRONIDAZOLE 500 MG: 500 INJECTION, SOLUTION INTRAVENOUS at 05:56

## 2019-01-01 RX ADMIN — METRONIDAZOLE 500 MG: 500 INJECTION, SOLUTION INTRAVENOUS at 05:49

## 2019-01-01 RX ADMIN — ACETYLCYSTEINE 600 MG: 200 SOLUTION ORAL; RESPIRATORY (INHALATION) at 01:35

## 2019-01-01 RX ADMIN — SODIUM CHLORIDE 40 ML/HR: 0.9 INJECTION, SOLUTION INTRAVENOUS at 17:33

## 2019-01-01 RX ADMIN — METRONIDAZOLE 500 MG: 500 INJECTION, SOLUTION INTRAVENOUS at 14:10

## 2019-01-01 RX ADMIN — METRONIDAZOLE 500 MG: 500 INJECTION, SOLUTION INTRAVENOUS at 21:14

## 2019-01-01 RX ADMIN — IPRATROPIUM BROMIDE AND ALBUTEROL SULFATE 3 ML: 2.5; .5 SOLUTION RESPIRATORY (INHALATION) at 07:16

## 2019-01-01 RX ADMIN — METOPROLOL TARTRATE 12.5 MG: 25 TABLET ORAL at 09:07

## 2019-01-01 RX ADMIN — DOCUSATE SODIUM 100 MG: 100 CAPSULE, LIQUID FILLED ORAL at 17:53

## 2019-01-01 RX ADMIN — CARBIDOPA AND LEVODOPA 2 TABLET: 25; 100 TABLET, EXTENDED RELEASE ORAL at 12:28

## 2019-01-01 RX ADMIN — Medication 250 MG: at 17:40

## 2019-01-01 RX ADMIN — IPRATROPIUM BROMIDE AND ALBUTEROL SULFATE 3 ML: 2.5; .5 SOLUTION RESPIRATORY (INHALATION) at 13:53

## 2019-01-01 RX ADMIN — IPRATROPIUM BROMIDE AND ALBUTEROL SULFATE 3 ML: 2.5; .5 SOLUTION RESPIRATORY (INHALATION) at 15:16

## 2019-01-01 RX ADMIN — CARBIDOPA AND LEVODOPA 2 TABLET: 25; 100 TABLET, EXTENDED RELEASE ORAL at 08:23

## 2019-01-01 RX ADMIN — CARBIDOPA AND LEVODOPA 2 TABLET: 25; 100 TABLET, EXTENDED RELEASE ORAL at 08:06

## 2019-01-01 RX ADMIN — PROPOFOL 100 MCG/KG/MIN: 10 INJECTION, EMULSION INTRAVENOUS at 16:12

## 2019-01-01 RX ADMIN — METRONIDAZOLE 500 MG: 500 INJECTION, SOLUTION INTRAVENOUS at 18:01

## 2019-01-01 RX ADMIN — DOCUSATE SODIUM 100 MG: 100 CAPSULE, LIQUID FILLED ORAL at 08:28

## 2019-01-01 RX ADMIN — GUAIFENESIN 600 MG: 600 TABLET, EXTENDED RELEASE ORAL at 08:33

## 2019-01-01 RX ADMIN — CEFTRIAXONE 1000 MG: 1 INJECTION, SOLUTION INTRAVENOUS at 16:01

## 2019-01-01 RX ADMIN — CYANOCOBALAMIN TAB 500 MCG 1000 MCG: 500 TAB at 16:57

## 2019-01-01 RX ADMIN — MULTIVITAMIN 15 ML: LIQUID ORAL at 08:08

## 2019-01-01 RX ADMIN — CEFTRIAXONE 1000 MG: 1 INJECTION, SOLUTION INTRAVENOUS at 17:29

## 2019-01-01 RX ADMIN — FAMOTIDINE 20 MG: 10 INJECTION, SOLUTION INTRAVENOUS at 08:02

## 2019-01-01 RX ADMIN — IPRATROPIUM BROMIDE AND ALBUTEROL SULFATE 3 ML: 2.5; .5 SOLUTION RESPIRATORY (INHALATION) at 07:48

## 2019-01-01 RX ADMIN — CARBIDOPA AND LEVODOPA 0.5 TABLET: 25; 100 TABLET ORAL at 13:23

## 2019-01-01 RX ADMIN — SODIUM CHLORIDE 40 ML/HR: 0.9 INJECTION, SOLUTION INTRAVENOUS at 19:44

## 2019-01-01 RX ADMIN — ACETYLCYSTEINE 600 MG: 200 SOLUTION ORAL; RESPIRATORY (INHALATION) at 20:18

## 2019-01-01 RX ADMIN — LACTOBACILLUS ACIDOPHILUS / LACTOBACILLUS BULGARICUS 1 PACKET: 100 MILLION CFU STRENGTH GRANULES at 08:31

## 2019-01-01 RX ADMIN — MINERAL OIL AND WHITE PETROLATUM: 150; 830 OINTMENT OPHTHALMIC at 11:45

## 2019-01-01 RX ADMIN — CARBIDOPA AND LEVODOPA 2 TABLET: 25; 100 TABLET, EXTENDED RELEASE ORAL at 09:04

## 2019-01-01 RX ADMIN — CARBIDOPA AND LEVODOPA 1 TABLET: 25; 100 TABLET ORAL at 11:37

## 2019-01-01 RX ADMIN — SENNOSIDES AND DOCUSATE SODIUM 1 TABLET: 8.6; 5 TABLET ORAL at 08:31

## 2019-01-01 RX ADMIN — CARBIDOPA AND LEVODOPA 1 TABLET: 25; 100 TABLET ORAL at 08:32

## 2019-01-01 RX ADMIN — IPRATROPIUM BROMIDE AND ALBUTEROL SULFATE 3 ML: 2.5; .5 SOLUTION RESPIRATORY (INHALATION) at 20:18

## 2019-01-01 RX ADMIN — IOHEXOL 85 ML: 350 INJECTION, SOLUTION INTRAVENOUS at 15:11

## 2019-01-01 RX ADMIN — Medication 250 MG: at 17:32

## 2019-01-01 RX ADMIN — CARBIDOPA AND LEVODOPA 2 TABLET: 25; 100 TABLET, EXTENDED RELEASE ORAL at 16:27

## 2019-01-01 RX ADMIN — IPRATROPIUM BROMIDE AND ALBUTEROL SULFATE 3 ML: 2.5; .5 SOLUTION RESPIRATORY (INHALATION) at 13:18

## 2019-01-01 RX ADMIN — METRONIDAZOLE 500 MG: 500 INJECTION, SOLUTION INTRAVENOUS at 03:29

## 2019-01-01 RX ADMIN — CARBIDOPA AND LEVODOPA 2 TABLET: 25; 100 TABLET, EXTENDED RELEASE ORAL at 11:40

## 2019-01-01 RX ADMIN — Medication 250 MG: at 08:28

## 2019-01-01 RX ADMIN — ACETYLCYSTEINE 600 MG: 200 SOLUTION ORAL; RESPIRATORY (INHALATION) at 07:17

## 2019-01-01 RX ADMIN — METRONIDAZOLE 500 MG: 500 INJECTION, SOLUTION INTRAVENOUS at 10:22

## 2019-01-01 RX ADMIN — MULTIVITAMIN 15 ML: LIQUID ORAL at 08:31

## 2019-01-01 RX ADMIN — CARBIDOPA AND LEVODOPA 2 TABLET: 25; 100 TABLET, EXTENDED RELEASE ORAL at 12:44

## 2019-01-01 RX ADMIN — CYANOCOBALAMIN TAB 500 MCG 1000 MCG: 500 TAB at 08:28

## 2019-01-01 RX ADMIN — DILTIAZEM HYDROCHLORIDE 17.5 MG: 5 INJECTION INTRAVENOUS at 18:42

## 2019-01-01 RX ADMIN — ACETYLCYSTEINE 600 MG: 200 SOLUTION ORAL; RESPIRATORY (INHALATION) at 13:18

## 2019-01-01 RX ADMIN — LACTOBACILLUS ACIDOPHILUS / LACTOBACILLUS BULGARICUS 1 PACKET: 100 MILLION CFU STRENGTH GRANULES at 11:41

## 2019-01-01 RX ADMIN — AZELASTINE HYDROCHLORIDE 1 SPRAY: 137 SPRAY, METERED NASAL at 17:31

## 2019-01-01 RX ADMIN — POTASSIUM CHLORIDE 20 MEQ: 14.9 INJECTION, SOLUTION INTRAVENOUS at 09:33

## 2019-01-01 RX ADMIN — MELATONIN 3 MG: at 21:39

## 2019-01-01 RX ADMIN — CARBIDOPA AND LEVODOPA 1 TABLET: 25; 100 TABLET ORAL at 08:31

## 2019-01-01 RX ADMIN — CARBIDOPA AND LEVODOPA 0.5 TABLET: 25; 100 TABLET ORAL at 11:50

## 2019-01-01 RX ADMIN — Medication 250 MG: at 17:58

## 2019-01-01 RX ADMIN — OXYCODONE HYDROCHLORIDE AND ACETAMINOPHEN 500 MG: 500 TABLET ORAL at 08:28

## 2019-01-01 RX ADMIN — DOCUSATE SODIUM 100 MG: 100 CAPSULE, LIQUID FILLED ORAL at 17:02

## 2019-01-01 RX ADMIN — ENOXAPARIN SODIUM 30 MG: 30 INJECTION SUBCUTANEOUS at 08:33

## 2019-01-01 RX ADMIN — GUAIFENESIN AND DEXTROMETHORPHAN 5 ML: 100; 10 SYRUP ORAL at 21:39

## 2019-01-01 RX ADMIN — CARBIDOPA AND LEVODOPA 2 TABLET: 25; 100 TABLET, EXTENDED RELEASE ORAL at 16:58

## 2019-01-01 RX ADMIN — CARBIDOPA AND LEVODOPA 1 TABLET: 25; 100 TABLET ORAL at 08:20

## 2019-01-01 RX ADMIN — GUAIFENESIN 600 MG: 600 TABLET, EXTENDED RELEASE ORAL at 21:54

## 2019-01-01 RX ADMIN — IPRATROPIUM BROMIDE AND ALBUTEROL SULFATE 3 ML: 2.5; .5 SOLUTION RESPIRATORY (INHALATION) at 07:36

## 2019-01-01 RX ADMIN — IPRATROPIUM BROMIDE AND ALBUTEROL SULFATE 3 ML: 2.5; .5 SOLUTION RESPIRATORY (INHALATION) at 20:45

## 2019-01-01 RX ADMIN — CEFTRIAXONE 1000 MG: 1 INJECTION, SOLUTION INTRAVENOUS at 16:23

## 2019-01-01 RX ADMIN — METOPROLOL TARTRATE 12.5 MG: 25 TABLET ORAL at 08:31

## 2019-01-01 RX ADMIN — ASCORBIC ACID TAB 250 MG 250 MG: 250 TAB at 15:13

## 2019-01-01 RX ADMIN — CARBIDOPA AND LEVODOPA 2 TABLET: 25; 100 TABLET, EXTENDED RELEASE ORAL at 12:34

## 2019-01-01 RX ADMIN — HYDRALAZINE HYDROCHLORIDE 5 MG: 20 INJECTION INTRAMUSCULAR; INTRAVENOUS at 04:45

## 2019-01-01 RX ADMIN — LABETALOL 20 MG/4 ML (5 MG/ML) INTRAVENOUS SYRINGE 7.5 MG: at 16:20

## 2019-01-01 RX ADMIN — CEFAZOLIN SODIUM 2000 MG: 2 SOLUTION INTRAVENOUS at 15:36

## 2019-01-01 RX ADMIN — DOCUSATE SODIUM 100 MG: 100 CAPSULE, LIQUID FILLED ORAL at 17:58

## 2019-01-01 RX ADMIN — DOCUSATE SODIUM 100 MG: 100 CAPSULE, LIQUID FILLED ORAL at 08:33

## 2019-01-01 RX ADMIN — IPRATROPIUM BROMIDE AND ALBUTEROL SULFATE 3 ML: 2.5; .5 SOLUTION RESPIRATORY (INHALATION) at 01:35

## 2019-01-01 RX ADMIN — METRONIDAZOLE 500 MG: 500 INJECTION, SOLUTION INTRAVENOUS at 05:14

## 2019-01-01 RX ADMIN — ENOXAPARIN SODIUM 50 MG: 60 INJECTION SUBCUTANEOUS at 08:41

## 2019-01-01 RX ADMIN — ASCORBIC ACID TAB 250 MG 250 MG: 250 TAB at 08:44

## 2019-01-01 RX ADMIN — MELATONIN 3 MG: at 20:33

## 2019-01-01 RX ADMIN — CARBIDOPA AND LEVODOPA 2 TABLET: 25; 100 TABLET ORAL at 14:29

## 2019-01-01 RX ADMIN — METRONIDAZOLE 500 MG: 500 INJECTION, SOLUTION INTRAVENOUS at 10:05

## 2019-01-01 RX ADMIN — METRONIDAZOLE 500 MG: 500 INJECTION, SOLUTION INTRAVENOUS at 14:48

## 2019-01-01 RX ADMIN — IPRATROPIUM BROMIDE AND ALBUTEROL SULFATE 3 ML: 2.5; .5 SOLUTION RESPIRATORY (INHALATION) at 07:09

## 2019-01-01 RX ADMIN — CARBIDOPA AND LEVODOPA 0.5 TABLET: 25; 100 TABLET ORAL at 11:39

## 2019-01-01 RX ADMIN — ENOXAPARIN SODIUM 50 MG: 60 INJECTION SUBCUTANEOUS at 21:30

## 2019-01-01 RX ADMIN — CARBIDOPA AND LEVODOPA 2 TABLET: 25; 100 TABLET ORAL at 17:37

## 2019-01-01 RX ADMIN — CEFTRIAXONE 1000 MG: 1 INJECTION, SOLUTION INTRAVENOUS at 15:45

## 2019-01-01 RX ADMIN — SODIUM CHLORIDE, SODIUM LACTATE, POTASSIUM CHLORIDE, AND CALCIUM CHLORIDE 125 ML/HR: .6; .31; .03; .02 INJECTION, SOLUTION INTRAVENOUS at 10:49

## 2019-01-01 RX ADMIN — GUAIFENESIN AND DEXTROMETHORPHAN 5 ML: 100; 10 SYRUP ORAL at 21:55

## 2019-01-01 RX ADMIN — METRONIDAZOLE 500 MG: 500 INJECTION, SOLUTION INTRAVENOUS at 18:21

## 2019-01-01 RX ADMIN — CARBIDOPA AND LEVODOPA 0.5 TABLET: 25; 100 TABLET ORAL at 08:34

## 2019-01-01 RX ADMIN — GUAIFENESIN 600 MG: 600 TABLET, EXTENDED RELEASE ORAL at 09:09

## 2019-01-01 RX ADMIN — METRONIDAZOLE 500 MG: 500 INJECTION, SOLUTION INTRAVENOUS at 02:08

## 2019-01-01 RX ADMIN — Medication 250 MG: at 17:29

## 2019-01-01 RX ADMIN — CARBIDOPA AND LEVODOPA 2 TABLET: 25; 100 TABLET, EXTENDED RELEASE ORAL at 16:15

## 2019-01-01 RX ADMIN — LIDOCAINE HYDROCHLORIDE 2.5 ML: 20 INJECTION, SOLUTION INFILTRATION; PERINEURAL at 16:13

## 2019-01-01 RX ADMIN — MELATONIN 3 MG: at 21:56

## 2019-01-01 RX ADMIN — CARBIDOPA AND LEVODOPA 1 TABLET: 25; 100 TABLET ORAL at 13:56

## 2019-01-01 RX ADMIN — AZITHROMYCIN MONOHYDRATE 500 MG: 500 INJECTION, POWDER, LYOPHILIZED, FOR SOLUTION INTRAVENOUS at 17:27

## 2019-01-01 RX ADMIN — KETOROLAC TROMETHAMINE 15 MG: 30 INJECTION INTRAMUSCULAR; INTRAVENOUS at 18:57

## 2019-01-01 RX ADMIN — METRONIDAZOLE 500 MG: 500 INJECTION, SOLUTION INTRAVENOUS at 21:38

## 2019-01-01 RX ADMIN — SENNOSIDES A AND B 17.6 MG: 415.36 LIQUID ORAL at 21:38

## 2019-01-01 RX ADMIN — CARBIDOPA AND LEVODOPA 0.5 TABLET: 25; 250 TABLET ORAL at 14:28

## 2019-01-01 RX ADMIN — SODIUM CHLORIDE 40 ML/HR: 0.9 INJECTION, SOLUTION INTRAVENOUS at 20:43

## 2019-01-01 RX ADMIN — DOCUSATE SODIUM 100 MG: 100 CAPSULE, LIQUID FILLED ORAL at 08:38

## 2019-01-01 RX ADMIN — CARBIDOPA AND LEVODOPA 0.5 TABLET: 25; 100 TABLET ORAL at 14:46

## 2019-01-01 RX ADMIN — Medication 1 TABLET: at 09:08

## 2019-01-01 RX ADMIN — CYANOCOBALAMIN TAB 500 MCG 1000 MCG: 500 TAB at 09:06

## 2019-01-01 RX ADMIN — IPRATROPIUM BROMIDE AND ALBUTEROL SULFATE 3 ML: 2.5; .5 SOLUTION RESPIRATORY (INHALATION) at 21:18

## 2019-01-01 RX ADMIN — METOPROLOL TARTRATE 12.5 MG: 25 TABLET ORAL at 16:30

## 2019-01-01 RX ADMIN — IPRATROPIUM BROMIDE AND ALBUTEROL SULFATE 3 ML: 2.5; .5 SOLUTION RESPIRATORY (INHALATION) at 02:07

## 2019-01-01 RX ADMIN — Medication 250 MG: at 17:53

## 2019-01-01 RX ADMIN — CEFTRIAXONE 1000 MG: 1 INJECTION, SOLUTION INTRAVENOUS at 16:08

## 2019-01-01 RX ADMIN — AZELASTINE HYDROCHLORIDE 1 SPRAY: 137 SPRAY, METERED NASAL at 17:02

## 2019-01-01 RX ADMIN — CARBIDOPA AND LEVODOPA 0.5 TABLET: 25; 100 TABLET ORAL at 15:49

## 2019-01-01 RX ADMIN — FENTANYL CITRATE 50 MCG: 50 INJECTION, SOLUTION INTRAMUSCULAR; INTRAVENOUS at 16:13

## 2019-01-01 RX ADMIN — POTASSIUM CHLORIDE 20 MEQ: 200 INJECTION, SOLUTION INTRAVENOUS at 12:41

## 2019-01-01 RX ADMIN — AZITHROMYCIN MONOHYDRATE 250 MG: 250 TABLET ORAL at 17:58

## 2019-01-01 RX ADMIN — CARBIDOPA AND LEVODOPA 1 TABLET: 25; 100 TABLET ORAL at 08:28

## 2019-01-01 RX ADMIN — CEFTRIAXONE 1000 MG: 1 INJECTION, SOLUTION INTRAVENOUS at 17:00

## 2019-01-01 RX ADMIN — DOCUSATE SODIUM 100 MG: 100 CAPSULE, LIQUID FILLED ORAL at 17:32

## 2019-01-01 RX ADMIN — CARBIDOPA AND LEVODOPA 2 TABLET: 25; 100 TABLET, EXTENDED RELEASE ORAL at 08:42

## 2019-01-01 RX ADMIN — CARBIDOPA AND LEVODOPA 0.5 TABLET: 25; 100 TABLET ORAL at 16:57

## 2019-01-01 RX ADMIN — LACTOBACILLUS ACIDOPHILUS / LACTOBACILLUS BULGARICUS 1 PACKET: 100 MILLION CFU STRENGTH GRANULES at 15:51

## 2019-01-01 RX ADMIN — CARBIDOPA AND LEVODOPA 1 TABLET: 25; 100 TABLET ORAL at 12:30

## 2019-01-01 RX ADMIN — ALBUTEROL SULFATE 2.5 MG: 2.5 SOLUTION RESPIRATORY (INHALATION) at 10:44

## 2019-01-01 RX ADMIN — CARBIDOPA AND LEVODOPA 2 TABLET: 25; 100 TABLET, EXTENDED RELEASE ORAL at 08:33

## 2019-01-01 RX ADMIN — Medication 250 MG: at 17:02

## 2019-01-01 RX ADMIN — CARBIDOPA AND LEVODOPA 2 TABLET: 25; 100 TABLET, EXTENDED RELEASE ORAL at 16:26

## 2019-01-01 RX ADMIN — IPRATROPIUM BROMIDE AND ALBUTEROL SULFATE 3 ML: 2.5; .5 SOLUTION RESPIRATORY (INHALATION) at 14:30

## 2019-01-01 RX ADMIN — DEXTRAN 70 AND HYPROMELLOSE 2910 1 DROP: 1; 3 SOLUTION/ DROPS OPHTHALMIC at 10:05

## 2019-01-01 RX ADMIN — METOPROLOL TARTRATE 12.5 MG: 25 TABLET ORAL at 08:26

## 2019-01-01 RX ADMIN — LEVOTHYROXINE SODIUM 25 MCG: 25 TABLET ORAL at 08:19

## 2019-01-01 RX ADMIN — CARBIDOPA AND LEVODOPA 2 TABLET: 25; 100 TABLET ORAL at 11:55

## 2019-01-01 RX ADMIN — ACETYLCYSTEINE 600 MG: 200 SOLUTION ORAL; RESPIRATORY (INHALATION) at 07:23

## 2019-01-01 RX ADMIN — LACTOBACILLUS ACIDOPHILUS / LACTOBACILLUS BULGARICUS 1 PACKET: 100 MILLION CFU STRENGTH GRANULES at 08:46

## 2019-01-01 RX ADMIN — CARBIDOPA AND LEVODOPA 2 TABLET: 25; 100 TABLET ORAL at 08:31

## 2019-01-01 RX ADMIN — DOCUSATE SODIUM 100 MG: 100 CAPSULE, LIQUID FILLED ORAL at 09:03

## 2019-01-01 RX ADMIN — Medication 250 MG: at 18:03

## 2019-01-01 RX ADMIN — LACTOBACILLUS ACIDOPHILUS / LACTOBACILLUS BULGARICUS 1 PACKET: 100 MILLION CFU STRENGTH GRANULES at 08:07

## 2019-01-01 RX ADMIN — SODIUM CHLORIDE, SODIUM GLUCONATE, SODIUM ACETATE, POTASSIUM CHLORIDE AND MAGNESIUM CHLORIDE 50 ML/HR: 526; 502; 368; 37; 30 INJECTION, SOLUTION INTRAVENOUS at 18:30

## 2019-01-01 RX ADMIN — METRONIDAZOLE 500 MG: 500 INJECTION, SOLUTION INTRAVENOUS at 18:41

## 2019-01-01 RX ADMIN — SENNOSIDES A AND B 8.8 MG: 415.36 LIQUID ORAL at 21:42

## 2019-01-01 RX ADMIN — IPRATROPIUM BROMIDE AND ALBUTEROL SULFATE 3 ML: 2.5; .5 SOLUTION RESPIRATORY (INHALATION) at 07:19

## 2019-01-01 RX ADMIN — CARBIDOPA AND LEVODOPA 1 TABLET: 25; 100 TABLET ORAL at 12:34

## 2019-01-01 RX ADMIN — BISACODYL 10 MG: 10 SUPPOSITORY RECTAL at 15:14

## 2019-01-01 RX ADMIN — IPRATROPIUM BROMIDE AND ALBUTEROL SULFATE 3 ML: 2.5; .5 SOLUTION RESPIRATORY (INHALATION) at 13:30

## 2019-01-01 RX ADMIN — METOPROLOL TARTRATE 12.5 MG: 25 TABLET ORAL at 21:42

## 2019-01-01 RX ADMIN — AZELASTINE HYDROCHLORIDE 1 SPRAY: 137 SPRAY, METERED NASAL at 17:47

## 2019-01-01 RX ADMIN — LACTOBACILLUS ACIDOPHILUS / LACTOBACILLUS BULGARICUS 1 PACKET: 100 MILLION CFU STRENGTH GRANULES at 07:59

## 2019-01-01 RX ADMIN — CARBIDOPA AND LEVODOPA 2 TABLET: 25; 100 TABLET, EXTENDED RELEASE ORAL at 17:47

## 2019-01-01 RX ADMIN — CARBIDOPA AND LEVODOPA 0.5 TABLET: 25; 100 TABLET ORAL at 18:38

## 2019-01-01 RX ADMIN — CARBIDOPA AND LEVODOPA 2 TABLET: 25; 100 TABLET, EXTENDED RELEASE ORAL at 07:59

## 2019-01-01 RX ADMIN — Medication 250 MG: at 09:04

## 2019-01-01 RX ADMIN — CARBIDOPA AND LEVODOPA 2 TABLET: 25; 100 TABLET, EXTENDED RELEASE ORAL at 11:51

## 2019-01-01 RX ADMIN — OXYCODONE HYDROCHLORIDE AND ACETAMINOPHEN 500 MG: 500 TABLET ORAL at 08:31

## 2019-01-01 RX ADMIN — METRONIDAZOLE 500 MG: 500 INJECTION, SOLUTION INTRAVENOUS at 17:52

## 2019-01-01 RX ADMIN — CEFAZOLIN SODIUM 2000 MG: 2 SOLUTION INTRAVENOUS at 10:53

## 2019-01-01 RX ADMIN — METRONIDAZOLE 500 MG: 500 INJECTION, SOLUTION INTRAVENOUS at 17:58

## 2019-01-01 RX ADMIN — CEFTRIAXONE 1000 MG: 1 INJECTION, SOLUTION INTRAVENOUS at 15:37

## 2019-01-01 RX ADMIN — CARBIDOPA AND LEVODOPA 2 TABLET: 25; 100 TABLET, EXTENDED RELEASE ORAL at 18:40

## 2019-01-01 RX ADMIN — Medication 250 MG: at 08:42

## 2019-01-01 RX ADMIN — MULTIVITAMIN 15 ML: LIQUID ORAL at 08:00

## 2019-01-01 RX ADMIN — CEFTRIAXONE 1000 MG: 1 INJECTION, SOLUTION INTRAVENOUS at 15:55

## 2019-01-01 RX ADMIN — Medication 250 MG: at 17:03

## 2019-01-01 RX ADMIN — METRONIDAZOLE 500 MG: 500 INJECTION, SOLUTION INTRAVENOUS at 21:56

## 2019-01-01 RX ADMIN — ENOXAPARIN SODIUM 30 MG: 30 INJECTION SUBCUTANEOUS at 08:32

## 2019-01-01 RX ADMIN — IPRATROPIUM BROMIDE AND ALBUTEROL SULFATE 3 ML: 2.5; .5 SOLUTION RESPIRATORY (INHALATION) at 07:47

## 2019-01-01 RX ADMIN — METRONIDAZOLE 500 MG: 500 INJECTION, SOLUTION INTRAVENOUS at 03:01

## 2019-01-01 RX ADMIN — HYDRALAZINE HYDROCHLORIDE 5 MG: 20 INJECTION INTRAMUSCULAR; INTRAVENOUS at 07:41

## 2019-01-01 RX ADMIN — CHOLECALCIFEROL (VITAMIN D3) 10 MCG (400 UNIT) TABLET 400 UNITS: at 16:57

## 2019-01-01 RX ADMIN — AZELASTINE HYDROCHLORIDE 1 SPRAY: 137 SPRAY, METERED NASAL at 08:31

## 2019-01-01 RX ADMIN — Medication 1 TABLET: at 09:58

## 2019-01-01 RX ADMIN — ASPIRIN 81 MG 81 MG: 81 TABLET ORAL at 08:31

## 2019-01-01 RX ADMIN — METRONIDAZOLE 500 MG: 500 INJECTION, SOLUTION INTRAVENOUS at 17:33

## 2019-01-01 RX ADMIN — SENNOSIDES A AND B 17.6 MG: 415.36 LIQUID ORAL at 21:32

## 2019-01-01 RX ADMIN — CARBIDOPA AND LEVODOPA 2 TABLET: 25; 100 TABLET, EXTENDED RELEASE ORAL at 08:27

## 2019-01-01 RX ADMIN — CEFTRIAXONE 1000 MG: 1 INJECTION, SOLUTION INTRAVENOUS at 16:20

## 2019-01-01 RX ADMIN — LACTOBACILLUS ACIDOPHILUS / LACTOBACILLUS BULGARICUS 1 PACKET: 100 MILLION CFU STRENGTH GRANULES at 17:43

## 2019-01-01 RX ADMIN — OXYCODONE HYDROCHLORIDE AND ACETAMINOPHEN 500 MG: 500 TABLET ORAL at 08:30

## 2019-01-01 RX ADMIN — IPRATROPIUM BROMIDE AND ALBUTEROL SULFATE 3 ML: 2.5; .5 SOLUTION RESPIRATORY (INHALATION) at 01:29

## 2019-01-01 RX ADMIN — Medication 15 MG: at 08:00

## 2019-01-01 RX ADMIN — CEFTRIAXONE 1000 MG: 1 INJECTION, SOLUTION INTRAVENOUS at 22:35

## 2019-01-01 RX ADMIN — CARBIDOPA AND LEVODOPA 0.5 TABLET: 25; 100 TABLET ORAL at 12:42

## 2019-01-01 RX ADMIN — LEVOTHYROXINE SODIUM 25 MCG: 25 TABLET ORAL at 05:15

## 2019-01-01 RX ADMIN — CARBIDOPA AND LEVODOPA 2 TABLET: 25; 100 TABLET, EXTENDED RELEASE ORAL at 16:33

## 2019-01-01 RX ADMIN — CARBIDOPA AND LEVODOPA 0.5 TABLET: 25; 100 TABLET ORAL at 17:46

## 2019-01-01 RX ADMIN — CARBIDOPA AND LEVODOPA 2 TABLET: 25; 100 TABLET, EXTENDED RELEASE ORAL at 13:56

## 2019-01-01 RX ADMIN — ACETYLCYSTEINE 600 MG: 200 SOLUTION ORAL; RESPIRATORY (INHALATION) at 02:29

## 2019-01-01 RX ADMIN — CARBIDOPA AND LEVODOPA 2 TABLET: 25; 100 TABLET, EXTENDED RELEASE ORAL at 08:31

## 2019-01-01 RX ADMIN — IPRATROPIUM BROMIDE AND ALBUTEROL SULFATE 3 ML: 2.5; .5 SOLUTION RESPIRATORY (INHALATION) at 14:57

## 2019-01-01 RX ADMIN — METRONIDAZOLE 500 MG: 500 INJECTION, SOLUTION INTRAVENOUS at 02:15

## 2019-01-01 RX ADMIN — SENNOSIDES A AND B 17.6 MG: 415.36 LIQUID ORAL at 22:14

## 2019-01-01 RX ADMIN — IPRATROPIUM BROMIDE AND ALBUTEROL SULFATE 3 ML: 2.5; .5 SOLUTION RESPIRATORY (INHALATION) at 14:21

## 2019-01-01 RX ADMIN — METRONIDAZOLE 500 MG: 500 INJECTION, SOLUTION INTRAVENOUS at 02:20

## 2019-01-01 RX ADMIN — METRONIDAZOLE 500 MG: 500 INJECTION, SOLUTION INTRAVENOUS at 11:58

## 2019-01-01 RX ADMIN — METRONIDAZOLE 500 MG: 500 INJECTION, SOLUTION INTRAVENOUS at 11:29

## 2019-01-01 RX ADMIN — CEFTRIAXONE 1000 MG: 1 INJECTION, SOLUTION INTRAVENOUS at 15:52

## 2019-01-01 RX ADMIN — IPRATROPIUM BROMIDE AND ALBUTEROL SULFATE 3 ML: 2.5; .5 SOLUTION RESPIRATORY (INHALATION) at 07:24

## 2019-01-01 RX ADMIN — ENOXAPARIN SODIUM 50 MG: 60 INJECTION SUBCUTANEOUS at 08:37

## 2019-01-01 RX ADMIN — CYANOCOBALAMIN TAB 500 MCG 1000 MCG: 500 TAB at 08:31

## 2019-01-01 RX ADMIN — IPRATROPIUM BROMIDE AND ALBUTEROL SULFATE 3 ML: 2.5; .5 SOLUTION RESPIRATORY (INHALATION) at 08:45

## 2019-01-01 RX ADMIN — CARBIDOPA AND LEVODOPA 2 TABLET: 25; 100 TABLET ORAL at 14:37

## 2019-01-01 RX ADMIN — METRONIDAZOLE 500 MG: 500 INJECTION, SOLUTION INTRAVENOUS at 18:34

## 2019-01-01 RX ADMIN — DOCUSATE SODIUM 100 MG: 100 CAPSULE, LIQUID FILLED ORAL at 08:42

## 2019-01-01 RX ADMIN — SODIUM CHLORIDE 1000 ML: 0.9 INJECTION, SOLUTION INTRAVENOUS at 13:46

## 2019-01-01 RX ADMIN — PROPOFOL 50 MG: 10 INJECTION, EMULSION INTRAVENOUS at 16:13

## 2019-01-01 RX ADMIN — FAMOTIDINE 20 MG: 10 INJECTION, SOLUTION INTRAVENOUS at 18:58

## 2019-01-01 RX ADMIN — METRONIDAZOLE 500 MG: 500 INJECTION, SOLUTION INTRAVENOUS at 17:43

## 2019-01-01 RX ADMIN — CARBIDOPA AND LEVODOPA 2 TABLET: 25; 100 TABLET, EXTENDED RELEASE ORAL at 08:30

## 2019-01-01 RX ADMIN — IPRATROPIUM BROMIDE AND ALBUTEROL SULFATE 3 ML: 2.5; .5 SOLUTION RESPIRATORY (INHALATION) at 08:27

## 2019-01-01 RX ADMIN — CARBIDOPA AND LEVODOPA 2 TABLET: 25; 100 TABLET, EXTENDED RELEASE ORAL at 12:19

## 2019-01-01 RX ADMIN — METRONIDAZOLE 500 MG: 500 INJECTION, SOLUTION INTRAVENOUS at 10:24

## 2019-01-01 RX ADMIN — CARBIDOPA AND LEVODOPA 1 TABLET: 25; 100 TABLET ORAL at 09:08

## 2019-01-01 RX ADMIN — Medication 250 MG: at 08:38

## 2019-01-01 RX ADMIN — OXYCODONE HYDROCHLORIDE AND ACETAMINOPHEN 500 MG: 500 TABLET ORAL at 09:07

## 2019-01-01 RX ADMIN — CARBIDOPA AND LEVODOPA 2 TABLET: 25; 100 TABLET, EXTENDED RELEASE ORAL at 09:10

## 2019-01-01 RX ADMIN — ACETYLCYSTEINE 600 MG: 200 SOLUTION ORAL; RESPIRATORY (INHALATION) at 14:21

## 2019-01-01 RX ADMIN — DOCUSATE SODIUM 100 MG: 100 CAPSULE, LIQUID FILLED ORAL at 17:03

## 2019-01-01 RX ADMIN — ENOXAPARIN SODIUM 50 MG: 60 INJECTION SUBCUTANEOUS at 09:14

## 2019-01-01 RX ADMIN — LACTOBACILLUS ACIDOPHILUS / LACTOBACILLUS BULGARICUS 1 PACKET: 100 MILLION CFU STRENGTH GRANULES at 17:45

## 2019-01-01 RX ADMIN — OXYCODONE HYDROCHLORIDE AND ACETAMINOPHEN 500 MG: 500 TABLET ORAL at 08:38

## 2019-01-01 RX ADMIN — DOCUSATE SODIUM 100 MG: 100 CAPSULE, LIQUID FILLED ORAL at 08:31

## 2019-01-01 RX ADMIN — CARBIDOPA AND LEVODOPA 1 TABLET: 25; 100 TABLET ORAL at 13:02

## 2019-01-01 RX ADMIN — CARBIDOPA AND LEVODOPA 1 TABLET: 25; 100 TABLET ORAL at 16:25

## 2019-01-01 RX ADMIN — ASPIRIN 81 MG 81 MG: 81 TABLET ORAL at 12:30

## 2019-01-01 RX ADMIN — IPRATROPIUM BROMIDE AND ALBUTEROL SULFATE 3 ML: 2.5; .5 SOLUTION RESPIRATORY (INHALATION) at 20:19

## 2019-01-01 RX ADMIN — CARBIDOPA AND LEVODOPA 0.5 TABLET: 25; 100 TABLET ORAL at 20:35

## 2019-01-01 RX ADMIN — IPRATROPIUM BROMIDE AND ALBUTEROL SULFATE 3 ML: 2.5; .5 SOLUTION RESPIRATORY (INHALATION) at 15:43

## 2019-01-01 RX ADMIN — POTASSIUM CHLORIDE 20 MEQ: 200 INJECTION, SOLUTION INTRAVENOUS at 10:27

## 2019-02-14 NOTE — DISCHARGE INSTRUCTIONS
Acute Cough   WHAT YOU NEED TO KNOW:   An acute cough can last up to 3 weeks  Common causes of an acute cough include a cold, allergies, or a lung infection  DISCHARGE INSTRUCTIONS:   Return to the emergency department if:   · You have trouble breathing or feel short of breath  · You cough up blood, or you see blood in your mucus  · You faint or feel weak or dizzy  · You have chest pain when you cough or take a deep breath  · You have new wheezing  Contact your healthcare provider if:   · You have a fever  · Your cough lasts longer than 4 weeks  · Your symptoms do not improve with treatment  · You have questions or concerns about your condition or care  Medicines:   · Medicines  may be needed to stop the cough, decrease swelling in your airways, or help open your airways  Medicine may also be given to help you cough up mucus  Ask your healthcare provider what over-the-counter medicines you can take  If you have an infection caused by bacteria, you may need antibiotics  · Take your medicine as directed  Contact your healthcare provider if you think your medicine is not helping or if you have side effects  Tell him or her if you are allergic to any medicine  Keep a list of the medicines, vitamins, and herbs you take  Include the amounts, and when and why you take them  Bring the list or the pill bottles to follow-up visits  Carry your medicine list with you in case of an emergency  Manage your symptoms:   · Do not smoke and stay away from others who smoke  Nicotine and other chemicals in cigarettes and cigars can cause lung damage and make your cough worse  Ask your healthcare provider for information if you currently smoke and need help to quit  E-cigarettes or smokeless tobacco still contain nicotine  Talk to your healthcare provider before you use these products  · Drink extra liquids as directed  Liquids will help thin and loosen mucus so you can cough it up   Liquids will also help prevent dehydration  Examples of good liquids to drink include water, fruit juice, and broth  Do not drink liquids that contain caffeine  Caffeine can increase your risk for dehydration  Ask your healthcare provider how much liquid to drink each day  · Rest as directed  Do not do activities that make your cough worse, such as exercise  · Use a humidifier or vaporizer  Use a cool mist humidifier or a vaporizer to increase air moisture in your home  This may make it easier for you to breathe and help decrease your cough  · Eat 2 to 5 mL of honey 2 times each day  Honey can help thin mucus and decrease your cough  · Use cough drops or lozenges  These can help decrease throat irritation and your cough  Follow up with your healthcare provider as directed:  Write down your questions so you remember to ask them during your visits  © 2017 [employer] Information is for End User's use only and may not be sold, redistributed or otherwise used for commercial purposes  All illustrations and images included in CareNotes® are the copyrighted property of A [employer]  or Adam Nieves  The above information is an  only  It is not intended as medical advice for individual conditions or treatments  Talk to your doctor, nurse or pharmacist before following any medical regimen to see if it is safe and effective for you

## 2019-02-14 NOTE — ED PROVIDER NOTES
History  Chief Complaint   Patient presents with    Cough      states patient starts coughing for about 45 minutes every am for past 2 weeks  Coughing up thick yellowish sputum     79 y/o female with cough for the past few days particularly in the morning, bringing up clear sputum,denies dsypnea, wheezing, fevers,chills or any other symptoms  No chest pain, denies dysuria, urgency or frequency  History of chronic UTIs  no abdominal pain, flank pain, diarrhea or constipation no symptoms  History provided by:  Patient and spouse   used: No        Prior to Admission Medications   Prescriptions Last Dose Informant Patient Reported? Taking?    Ascorbic Acid (VITAMIN C PO) 2019 at Unknown time  Yes Yes   Sig: Take 1 tablet by mouth daily   Cholecalciferol (VITAMIN D PO) 2019 at Unknown time  Yes Yes   Sig: Take 1 tablet by mouth daily   Coenzyme Q10 (COQ10 PO) 2019 at Unknown time  Yes Yes   Sig: Take by mouth   Cyanocobalamin (VITAMIN B 12 PO) 2019 at Unknown time  Yes Yes   Sig: Take 1 tablet by mouth daily   azelastine (ASTELIN) 0 1 % nasal spray 2019 at Unknown time  No Yes   Si spray into each nostril 2 (two) times a day Use in each nostril as directed   carbidopa-levodopa (SINEMET CR)  mg TBCR per ER tablet 2019 at 12:30  No Yes   Sig: Take 1tab tid and 2tabs qhs   carbidopa-levodopa (SINEMET)  mg per tablet 2019 at 12:30  No Yes   Sig: Take 2 tablets by mouth 3 (three) times a day 1003,3485,9349   progesterone (PROMETRIUM) 200 MG capsule 2019 at Unknown time  Yes Yes   Sig: Take 200 mg by mouth daily at bedtime     thyroid desiccated (ARMOUR) 30 mg tablet 2019 at Unknown time  Yes Yes   Sig: Take 30 mg by mouth daily      Facility-Administered Medications: None       Past Medical History:   Diagnosis Date    Arthritis     Hypothyroidism     Parkinson's disease ([facility] [address] )     Thyroid disease        Past Surgical History: Procedure Laterality Date    MO CYSTOURETHROSCOPY,URETER CATHETER Left 11/23/2018    Procedure: CYSTOSCOPY RETROGRADE PYELOGRAM WITH INSERTION STENT Zora Daron OF STRICTURE;  Surgeon: Jadon Worley MD;  Location: [facility];  Service: Urology       Family History   Problem Relation Age of Onset    Hypertension Mother     Lung cancer Father      I have reviewed and agree with the history as documented  Social History     Tobacco Use    Smoking status: Never Smoker    Smokeless tobacco: Never Used   Substance Use Topics    Alcohol use: No    Drug use: No        Review of Systems   All other systems reviewed and are negative  Physical Exam  Physical Exam   Constitutional: She is oriented to person, place, and time  She appears well-developed and well-nourished  HENT:   Head: Normocephalic and atraumatic  Eyes: Pupils are equal, round, and reactive to light  EOM are normal    Neck: Normal range of motion  Neck supple  Cardiovascular: Normal rate and regular rhythm  Pulmonary/Chest: Breath sounds normal  No stridor  No respiratory distress  She has no wheezes  She has no rales  She exhibits no tenderness  Abdominal: Soft  Bowel sounds are normal  She exhibits no distension and no mass  There is no tenderness  There is no rebound and no guarding  No hernia  Musculoskeletal: Normal range of motion  Neurological: She is alert and oriented to person, place, and time  Skin: Skin is warm and dry  Psychiatric: She has a normal mood and affect  Nursing note and vitals reviewed        Vital Signs  ED Triage Vitals [02/14/19 1423]   Temperature Pulse Respirations Blood Pressure SpO2   98 1 °F (36 7 °C) 72 18 140/64 95 %      Temp Source Heart Rate Source Patient Position - Orthostatic VS BP Location FiO2 (%)   Tympanic Monitor Sitting Left arm --      Pain Score       No Pain           Vitals:    02/14/19 1423   BP: 140/64   Pulse: 72   Patient Position - Orthostatic VS: Sitting Visual Acuity      ED Medications  Medications - No data to display    Diagnostic Studies  Results Reviewed     Procedure Component Value Units Date/Time    Urine Microscopic [[id number]]  (Abnormal) Collected:  02/14/19 1718    Lab Status:  Final result Specimen:  Urine, Clean Catch Updated:  02/14/19 1756     RBC, UA 1-2 /hpf      WBC, UA 10-20 /hpf      Epithelial Cells Moderate /hpf      Bacteria, UA Moderate /hpf     UA w Reflex to Microscopic [[id number]]  (Abnormal) Collected:  02/14/19 1718    Lab Status:  Final result Specimen:  Urine, Clean Catch Updated:  02/14/19 1736     Color, UA Yellow     Clarity, UA Clear     Specific Gravity, UA 1 025     pH, UA 7 0     Leukocytes, UA Small     Nitrite, UA Negative     Protein, UA Trace mg/dl      Glucose, UA Negative mg/dl      Ketones, UA Trace mg/dl      Urobilinogen, UA 0 2 E U /dl      Bilirubin, UA Negative     Blood, UA Negative    Urine culture [[id number]] Collected:  02/14/19 1718    Lab Status: In process Specimen:  Urine, Clean Catch Updated:  02/14/19 1724                 XR chest portable    (Results Pending)              Procedures  Procedures       Phone Contacts  ED Phone Contact    ED Course                               MDM  Number of Diagnoses or Management Options  Cough:   Viral URI with cough:   Diagnosis management comments: Patient evaluated with labs, imaging  I reviewed the results and discussed them with the patient  Patient discharged with appropriate instructions medications and follow-up  Patient verbalized understanding had no further questions at the time of discharge  Patient had stable vital signs and well-appearing at the time of discharge  no acute findings on CXR found by me, no fevers so decided no antibiotics at this time         Amount and/or Complexity of Data Reviewed  Clinical lab tests: ordered and reviewed  Tests in the radiology section of CPT®: ordered and reviewed  Tests in the medicine section of CPT®: ordered and reviewed    Patient Progress  Patient progress: stable      Disposition  Final diagnoses:   Viral URI with cough   Cough     Time reflects when diagnosis was documented in both MDM as applicable and the Disposition within this note     Time User Action Codes Description Comment    2/14/2019  6:05 PM Anna Bowser Add [J06 9,  B97 89] Viral URI with cough     2/14/2019  6:05 PM MaravalarieCici Add [R05] Cough       ED Disposition     ED Disposition Condition Date/Time Comment    Discharge Stable u Feb 14, 2019  6:05 PM [address] discharge to home/self care              Follow-up Information     Follow up With Specialties Details Why Contact Info Additional [facility] Internal Medicine  If symptoms worsen [address] [facility] [address] Emergency Department Emergency Medicine   [place] [address]  [phone] [facility] ED, [facility], [address]          Discharge Medication List as of 2/14/2019  6:06 PM      START taking these medications    Details   benzonatate (TESSALON PERLES) 100 mg capsule Take 1 capsule (100 mg total) by mouth every 8 (eight) hours for 4 days, Starting Thu 2/14/2019, Until Mon 2/18/2019, Print         CONTINUE these medications which have NOT CHANGED    Details   Ascorbic Acid (VITAMIN C PO) Take 1 tablet by mouth daily, Historical Med      azelastine (ASTELIN) 0 1 % nasal spray 1 spray into each nostril 2 (two) times a day Use in each nostril as directed, Starting Sat 11/24/2018, Normal      carbidopa-levodopa (SINEMET CR)  mg TBCR per ER tablet Take 1tab tid and 2tabs qhs, Normal      carbidopa-levodopa (SINEMET)  mg per tablet Take 2 tablets by mouth 3 (three) times a day [id number], Starting Tue 11/27/2018, Normal      Cholecalciferol (VITAMIN D PO) Take 1 tablet by mouth daily, Historical Med      Coenzyme Q10 (COQ10 PO) Take by mouth, Historical Med      Cyanocobalamin (VITAMIN B 12 PO) Take 1 tablet by mouth daily, Historical Med      progesterone (PROMETRIUM) 200 MG capsule Take 200 mg by mouth daily at bedtime  , Historical Med      thyroid desiccated (ARMOUR) 30 mg tablet Take 30 mg by mouth daily, Until Discontinued, Historical Med           No discharge procedures on file      ED Provider  Electronically Signed by           Kita Bence, DO  02/14/19 6077

## 2019-02-14 NOTE — ED NOTES
Prescription called into [facility] pharmacy, [place], [place] per pt request      Reyna Ramos RN  02/14/19 5921

## 2019-02-18 NOTE — TELEPHONE ENCOUNTER
Pt's  called in, states that she usually walks around the house with a walker, but 2 days ago she became weak, and her caregivers (2) and him had to carry her into bed  Then she had to use the bathroom so they had to carry her to the commode  The next morning she was able to walk  Asking what happened that she all of a sudden lost the use of her legs  He states that she was recently in the hospital for an URI  States that she was told that she does not have a UTI or kidney infection

## 2019-02-19 NOTE — TELEPHONE ENCOUNTER
Pt called re: below  Pt's  Carlos Amaya states that pt went to [facility] d/t cough and bringing up clear sputum  Pt was advised by pcp to go to ER  Pls see ed notes  Carlos Amaya states that pt is walking better now  Unsure why pt could not walk few days ago, "something is wrong"  He was advised by pcp to contact us  No new meds               [phone]

## 2019-02-20 NOTE — TELEPHONE ENCOUNTER
It appears she has an URI recently  Temporary worsening of PD symptoms sometimes occurs when someone with PD develops an underlying infection, even if it is viral  The fact that she is back to baseline is reassuring  If he feels that she is still having residual symptoms, her appointment with Olman Walker can be moved up so she can take a look at her

## 2019-02-22 NOTE — TELEPHONE ENCOUNTER
Called and spoke to Mauricio Pascual (pt caregiver) and advised her of the below  She states her walking seems to be getting better   But then requested I call Rajani Gardner at #[phone], Called and Left a message on [employer] machine for a call back

## 2019-04-17 PROBLEM — K22.89 ESOPHAGEAL DILATATION: Status: ACTIVE | Noted: 2019-01-01

## 2019-04-17 PROBLEM — K44.9 HIATAL HERNIA: Status: ACTIVE | Noted: 2019-01-01

## 2019-04-17 PROBLEM — J15.9 COMMUNITY ACQUIRED BACTERIAL PNEUMONIA: Status: ACTIVE | Noted: 2019-01-01

## 2019-04-18 PROBLEM — R13.19 OTHER DYSPHAGIA: Status: ACTIVE | Noted: 2019-01-01

## 2019-04-21 PROBLEM — I21.A1 TYPE 2 MYOCARDIAL INFARCTION (HCC): Status: ACTIVE | Noted: 2019-01-01

## 2019-04-24 PROBLEM — J15.9 COMMUNITY ACQUIRED BACTERIAL PNEUMONIA: Status: RESOLVED | Noted: 2019-01-01 | Resolved: 2019-01-01

## 2019-04-24 PROBLEM — I21.A1 TYPE 2 MYOCARDIAL INFARCTION (HCC): Status: RESOLVED | Noted: 2019-01-01 | Resolved: 2019-01-01

## 2019-07-29 NOTE — PROGRESS NOTES
Patient ID: Niki Wheeler is a 80 y o  female  Assessment/Plan:    Parkinson's disease ([facility] 75 )  Parkinson disease complicated by unpredictable afternoon off times, postural instability and dysphagia  Off times occur daily, typically in the afternoon but times vary  Time spent discussing options such as adding Inrija to bridge off doses versus switching to Rytary  She wishes to try Inbrija  Will order to see if covered and she will take only when off prior to her next dose  Time spent answering questions regarding CoQ10, supplements and diet  Protein and levodopa discussed  Importance of hydration and diet to control constipation  Diet and liquids have been modified given dysphagia  I have spent 40 minutes with Patient and family today in which greater than 50% of this time was spent in counseling/coordination of care regarding Prognosis, Risks and benefits of tx options, Intructions for management and Patient and family education  Diagnoses and all orders for this visit:    Parkinson's disease ([facility] )  -     Levodopa (INBRIJA) 42 MG CAPS; Orally inhale contents fo 2 capsules prn off period (Do not exceed 2 doses daily)           Subjective:       Rebecca Davidson is an 80year old woman with Parkinson's disease who presents for follow up  To review, she was diagnosed with Parkinson's disease around 2006 at [facility]  She saw several neurologist prior to and after this diagnosis  She followed with her PCP who started Sinemet ER  Selegiline trial stopped due to no effect and GI side effects  Comtan never tried  She was hospitalized 4/17/19 with pneumonia with concern for aspiration pneumonia  Video barium swallow evaluation done by speech therapy 4/18/19 showed mild to moderate oral and suspected moderate pharyngeal dysphagia with silent aspiration  GI was consulted but family did not want PEG tube placed   She was last seen by Puja [facility]en[facility] 6/2019 and medications increased due to wearing off  She returns today on Sinemet 25/100mg 1/2 tab along with Sinemet CR 25/100mg 2tabs q 3 hours, 4 times daily (1000mg levodopa daily)  At times she feels "sick" described as feeling weak and lips quivering in the am or before or after taking a dose  When it occurs in the am, it occurs before she takes her dose  This improves 30-60 minutes after each dose  She lives with her  and has has a live in aide  She is now on thickened liquids  She eats soft, chopped food  She will cough and choke on phelgm at times  No dysphagia with food on this diet  She has a lot of phlegm in the morning  She is assisted with dressing and showering but does do help  Sleep is variable  She has mild daytime sedation  She typically has a nap late morning  There are no issues with lightheadedness  She has constipation for which she is on a stool softner and eats prunes  It is harder for her to drink more due to dysphagia  She goes every day or other day  She has urinary incontinence at night  She is good during the day  e denies any hallucinations or changes in cognition  Speech is soft  She has [facility] home therapy, both PT (3x a week) and speech (1xweek)  She ambulated with a rolling walker but not as well as she has previously  She has freezing  Counting out loud helps break it  She has fallen a few times  The following portions of the patient's history were reviewed and updated as appropriate: allergies, current medications, past family history, past medical history, past social history, past surgical history and problem list          Objective:    Blood pressure 128/80, pulse 66, height 5' 1" (1 549 m), weight 48 3 kg (106 lb 8 oz)  Physical Exam   Constitutional: She appears well-developed  Eyes: Pupils are equal, round, and reactive to light  Neurological: She has normal strength  Psychiatric: Her speech is normal    Vitals reviewed        Neurological Exam  Mental Status   Oriented to person, place, time and situation  Recent and remote memory are intact  Speech is normal  Language is fluent with no aphasia  Attention and concentration are normal     Cranial Nerves  CN III, IV, VI: Extraocular movements intact bilaterally  Pupils equal round and reactive to light bilaterally  CN V: Facial sensation is normal   CN VII: Full and symmetric facial movement  CN VIII: Hearing is normal   CN IX, X: Palate elevates symmetrically  CN XI: Shoulder shrug strength is normal   CN XII: Tongue midline without atrophy or fasciculations  Motor   Normal muscle tone  Strength is 5/5 throughout all four extremities  Sensory  Light touch is normal in upper and lower extremities  Reflexes  Glabellar tap present  Coordination  Right: Finger-to-nose normal  Rapid alternating movement abnormality:  Left: Finger-to-nose normal  Rapid alternating movement abnormality:  See MDS UPDRS III  Gait  Casual gait: Deferred   Able to rise from chair without using arms        MDS UPDRS III                                  Time since last dose: 2h 40 min    Speech  2    Facial Expression  2    Rigidity - Neck  0    Rigidity - Upper Extremity (Right)  0    Rigidity - Upper Extremity (Left)   0    Rigidity - Lower Extremity (Right)  0    Rigidity - Lower Extremity (Left)   0    Finger Taps (Right)   2    Finger Taps (Left)   2    Hand Movement (Right)  2    Hand Movement (Left)   2    Pronation/Supination (Right)  1    Pronation/Supination (Left)   2    Toe Tapping (Right) 1    Toe Tapping (Left) 2    Leg Agility (Right)  1    Leg Agility (Left)   1    Arising from Chair   3    Gait   3    Freezing of Gait 0    Postural Stability       Posture 2    Global spontaneity of movement 2    Postural Tremor (Right) 0    Postural Tremor (Left) 0    Kinetic Tremor (Right)  0    Kinetic Tremor (Left)  0    Rest tremor amplitude RUE 0    Rest tremor amplitude LUE 0    Rest tremor amplitude RLE 0    Reset tremor amplitude LLE 0 Lip/Jaw Tremor  0    Consistency of tremor 0    Motor Exam Total:              ROS:    Review of Systems   Constitutional: Negative  Negative for appetite change and fever  HENT: Negative  Negative for hearing loss, tinnitus, trouble swallowing and voice change  Eyes: Negative  Negative for photophobia and pain  Respiratory: Negative  Negative for shortness of breath  Cardiovascular: Negative  Negative for palpitations  Gastrointestinal: Positive for constipation  Negative for nausea and vomiting  Endocrine: Negative  Negative for cold intolerance and heat intolerance  Genitourinary: Positive for frequency  Negative for urgency  Musculoskeletal: Negative  Negative for myalgias and neck pain  Skin: Negative  Negative for rash  Neurological: Negative  Negative for dizziness, tremors, seizures, syncope, facial asymmetry, speech difficulty, weakness, light-headedness, numbness and headaches  Hematological: Negative  Does not bruise/bleed easily  Psychiatric/Behavioral: Negative  Negative for confusion, hallucinations and sleep disturbance  Review of system was personally reviewed

## 2019-07-29 NOTE — PATIENT INSTRUCTIONS
We can try to see if Meryle Meo will be covered to see if this can help with bridging unpredictable off periods  No other changes

## 2019-07-30 NOTE — ASSESSMENT & PLAN NOTE
Parkinson disease complicated by unpredictable afternoon off times, postural instability and dysphagia  Off times occur daily, typically in the afternoon but times vary  Time spent discussing options such as adding Inrija to bridge off doses versus switching to Rytary  She wishes to try Inbrija  Will order to see if covered and she will take only when off prior to her next dose  Time spent answering questions regarding CoQ10, supplements and diet  Protein and levodopa discussed  Importance of hydration and diet to control constipation  Diet and liquids have been modified given dysphagia  I have spent 40 minutes with Patient and family today in which greater than 50% of this time was spent in counseling/coordination of care regarding Prognosis, Risks and benefits of tx options, Intructions for management and Patient and family education

## 2019-08-06 NOTE — TELEPHONE ENCOUNTER
Juan Ramon from [facility] called re: rx inbrija 42 caps  States that they received inbrija rx but unable to ran the claim bec they do not have pharmacy coverage info  States that this med cannot be filled at [facility], it has to go to specialty pharmacy  States that they tried to contact pt multiple times but not successful  [facility] pharmacy and advised of the below  Confirmed that inbrija cannot be filled at local pharmacy  Rx needs to go to specialty pharmacy  rx [facility] [id number]  pcn a4  [facility]  ID [id number]  [phone]      [facility], spoke w/ Trev Leon and advised of the the above pharmacy info  [facility] verbalized understanding           [phone]

## 2019-08-14 NOTE — TELEPHONE ENCOUNTER
Received fax from [address] [place] [address] prescription [facility] stating taht script was triaged to [facility] on 8/6/19

## 2019-10-01 NOTE — SPEECH THERAPY NOTE
Speech Language/Pathology    Speech/Language Pathology Progress Note    Patient Name: Angel PEREIRA Date: 10/1/2019     As this SLP was finishing bedside swallow eval pt's  had several questions and concerns re the pt's current function/status   brought to SLPs attention that previous physicians over the course of his wifes dx with PD have discussed PEG tube   wanted additional information re it's function and benefits  SLP discussed that a PEG tube can be placed as an OP and requires sedation  There is a risk for infection and complications, but would allow a person to receive nutrition via PEG w/o the risk for the pt to aspirate on such foods  However, the pt would still be at risk for bottum-up aspiration from stomach and coughing/choking/aspiration on her own saliva as we cannot stop saliva production   asked if one can take food by mouth in addition to TF  SLP discussed that it is on a personal basis and insurance companies  It is often the case where the TF may not be covered if the pt is able to take a certain percentage by mouth safely   reported he is not concerned about money/insurance but unsure if it was allowed in general  SLP stated that it would be recommended she receives the safest foods/drinks only for her swallow and a modified diet would be warranted and possibly come more restrictive as her swallow function declines  He asked about nutrition and SLP informed them that an RD is often consulted in addition to the MD placing the food to personalize TF regimen  VNA may also be present to assist with feedings/insturction and care  Also discussed that a repeat VBS with speech can also be done as an OP if her symptoms worsen (which they are currently managed well when adequately medicated)  Highly recommended OP f/u with GI to discuss PEG tube in depth as SLP is not the one who specializes in that area           Overall the pt's  was repetitive with questioning and had decreased understanding of SLPs information despite terminology and re-explanation provided  SLP provided him with her contact information and that f/u phone calls can be made to discuss in detail again if needed  Discussed with MD and RN as well  Of note, SLP attempted to include pt in conversation and  outlashed at SLP  Pt did state she DOES NOT WANT PEG TUBE      Plan/Recommendations:  -Continue home diet of mechanical with NTL  -F/u with GI for PEG tube discussion per husbands wishes  -Neurology f/u for sinemet and symptom control  -OP VBS with speech to reassess swallow if symptoms worsen during doses of sinemet before wearing off window        Bette WALKER  [id number] [facility]  Speech-Language Pathologist  Available via [employer]   [place] #[id number]  [place] #[id number]

## 2019-10-01 NOTE — ED PROVIDER NOTES
History  Chief Complaint   Patient presents with    Shortness of Breath     pt presents to the ed with SOB and chest congestion for greater than 1 month  Care giver states the pt began to produce more mucous with cough starting to day  pt has recent dx of asperation pneumonia      81 y/o female presents with cough, shortness of breath for the past few days but today presents with increased phlegm  No fevers,chills, nausea,vomiting, chest pain or any other symptoms  History of parkinson's disease, and has been having trouble swallowing and tolerating her secretions  Her diet has been modified, and she does have a history of aspiration pneumonia  No COPD, asthma  Almost completed oral antibiotics 2 days left of amoxicillin out of 10 days  History provided by:  Patient   used: No        Prior to Admission Medications   Prescriptions Last Dose Informant Patient Reported? Taking?    Ascorbic Acid (VITAMIN C PO)   Yes No   Sig: Take 1 tablet by mouth daily   Cholecalciferol (VITAMIN D PO)   Yes No   Sig: Take 1 tablet by mouth daily   Coenzyme Q10 (COQ10 PO)   Yes No   Sig: Take by mouth   Levodopa (INBRIJA) 42 MG CAPS   No No   Sig: Orally inhale contents fo 2 capsules prn off period (Do not exceed 2 doses daily)   aspirin 81 mg chewable tablet   No No   Sig: Chew 1 tablet (81 mg total) daily   Patient not taking: Reported on 2019   azelastine (ASTELIN) 0 1 % nasal spray   No No   Si spray into each nostril 2 (two) times a day Use in each nostril as directed   Patient not taking: Reported on 2019   bisacodyl (DULCOLAX) 10 mg suppository   No No   Sig: Insert 1 suppository (10 mg total) into the rectum daily as needed for constipation   carbidopa-levodopa (SINEMET CR)  mg TBCR per ER tablet   No No   Sig: Take 2 tablets by mouth 4 (four) times a day   carbidopa-levodopa (SINEMET)  mg per tablet   No No   Sig: Take 0 5 tablets by mouth 4 (four) times a day   cyanocobalamin 1000 MCG tablet   No No   Sig: Take 1 tablet (1,000 mcg total) by mouth daily   dextromethorphan-guaiFENesin (ROBITUSSIN DM)  mg/5 mL syrup   No No   Sig: Take 10 mL by mouth every 4 (four) hours as needed for cough   Patient not taking: Reported on 6/4/2019   docusate sodium (COLACE) 100 mg capsule   No No   Sig: Take 1 capsule (100 mg total) by mouth 2 (two) times a day   Patient not taking: Reported on 7/29/2019   metoprolol tartrate (LOPRESSOR) 25 mg tablet   No No   Sig: Take 0 5 tablets (12 5 mg total) by mouth every 12 (twelve) hours   Patient not taking: Reported on 6/4/2019   polyvinyl alcohol (LIQUIFILM TEARS) 1 4 % ophthalmic solution   No No   Sig: Administer 1 drop to both eyes every 4 (four) hours as needed for dry eyes   Patient taking differently: Administer 1 drop to both eyes as needed for dry eyes    progesterone (PROMETRIUM) 200 MG capsule   Yes No   Sig: Take 200 mg by mouth daily at bedtime     saccharomyces boulardii (FLORASTOR) 250 mg capsule   No No   Sig: Take 1 capsule (250 mg total) by mouth 2 (two) times a day   Patient not taking: Reported on 6/4/2019   thyroid desiccated (ARMOUR) 30 mg tablet   Yes No   Sig: Take 15 mg by mouth daily       Facility-Administered Medications: None       Past Medical History:   Diagnosis Date    Arthritis     Aspiration into airway     Hypothyroidism     Parkinson's disease ([address] )     Thyroid disease        Past Surgical History:   Procedure Laterality Date    [place] CYSTOURETHROSCOPY,URETER CATHETER Left 11/23/2018    Procedure: CYSTOSCOPY RETROGRADE PYELOGRAM WITH INSERTION STENT [address];  Surgeon: Anuradha Hargrove MD;  Location: [facility];  Service: Urology       Family History   Problem Relation Age of Onset    Hypertension Mother     Lung cancer Father      I have reviewed and agree with the history as documented      Social History     Tobacco Use    Smoking status: Never Smoker    Smokeless tobacco: Never Used   Substance Use Topics    Alcohol use: Never     Frequency: Never    Drug use: No        Review of Systems   All other systems reviewed and are negative  Physical Exam  Physical Exam   Constitutional: She is oriented to person, place, and time  She appears well-developed and well-nourished  HENT:   Head: Normocephalic and atraumatic  Eyes: Pupils are equal, round, and reactive to light  EOM are normal    Neck: Normal range of motion  Neck supple  Cardiovascular: Normal rate and regular rhythm  Pulmonary/Chest: Effort normal and breath sounds normal  She has no decreased breath sounds  She has no wheezes  She has no rhonchi  She has no rales  Abdominal: Soft  Bowel sounds are normal    Musculoskeletal: Normal range of motion  Neurological: She is alert and oriented to person, place, and time  Skin: Skin is warm and dry  Psychiatric: She has a normal mood and affect  Nursing note and vitals reviewed        Vital Signs  ED Triage Vitals   Temperature Pulse Respirations Blood Pressure SpO2   10/01/19 1208 10/01/19 1208 10/01/19 1208 10/01/19 1208 10/01/19 1208   98 1 °F (36 7 °C) 64 18 161/72 98 %      Temp Source Heart Rate Source Patient Position - Orthostatic VS BP Location FiO2 (%)   10/01/19 1208 10/01/19 1208 10/01/19 1345 10/01/19 1345 --   Tympanic Monitor Lying Right arm       Pain Score       --                  Vitals:    10/01/19 1415 10/01/19 1430 10/01/19 1445 10/01/19 1500   BP: 162/73 (!) 175/70 162/72 (!) 177/76   Pulse: 62 62 62 60   Patient Position - Orthostatic VS:             Visual Acuity      ED Medications  Medications - No data to display    Diagnostic Studies  Results Reviewed     Procedure Component Value Units Date/Time    TSH [[id number]]  (Normal) Collected:  10/01/19 1243    Lab Status:  Final result Specimen:  Blood from Arm, Left Updated:  10/01/19 1321     TSH 3RD GENERATON 1 592 uIU/mL     Narrative:       Patients undergoing fluorescein dye angiography may retain small amounts of fluorescein in the body for 48-72 hours post procedure  Samples containing fluorescein can produce falsely depressed TSH values  If the patient had this procedure,a specimen should be resubmitted post fluorescein clearance  Urine Microscopic [[id number]]  (Abnormal) Collected:  10/01/19 1243    Lab Status:  Final result Specimen:  Urine, Clean Catch Updated:  10/01/19 1310     RBC, UA None Seen /hpf      WBC, UA 1-2 /hpf      Epithelial Cells Occasional /hpf      Bacteria, UA Innumerable /hpf      Ca Oxalate Adalgisa, UA Occasional /hpf     Lactic acid, plasma [[id number]]  (Normal) Collected:  10/01/19 1229    Lab Status:  Final result Specimen:  Blood from Arm, Left Updated:  10/01/19 1305     LACTIC ACID 0 8 mmol/L     Narrative:       Result may be elevated if tourniquet was used during collection      Troponin I [[id number]]  (Normal) Collected:  10/01/19 1229    Lab Status:  Final result Specimen:  Blood from Arm, Left Updated:  10/01/19 1303     Troponin I 0 02 ng/mL     Comprehensive metabolic panel [[id number]]  (Abnormal) Collected:  10/01/19 1229    Lab Status:  Final result Specimen:  Blood from Arm, Left Updated:  10/01/19 1300     Sodium 141 mmol/L      Potassium 4 2 mmol/L      Chloride 104 mmol/L      CO2 31 mmol/L      ANION GAP 6 mmol/L      BUN 18 mg/dL      Creatinine 0 88 mg/dL      Glucose 102 mg/dL      Calcium 9 4 mg/dL      AST 32 U/L      ALT 8 U/L      Alkaline Phosphatase 116 U/L      Total Protein 7 1 g/dL      Albumin 3 0 g/dL      Total Bilirubin 0 50 mg/dL      eGFR 60 ml/min/1 73sq m     Narrative:       [facility] guidelines for Chronic Kidney Disease (CKD):     Stage 1 with normal or high GFR (GFR > 90 mL/min/1 73 square meters)    Stage 2 Mild CKD (GFR = 60-89 mL/min/1 73 square meters)    Stage 3A Moderate CKD (GFR = 45-59 mL/min/1 73 square meters)    Stage 3B Moderate CKD (GFR = 30-44 mL/min/1 73 square meters)    Stage 4 Severe CKD (GFR = 15-29 mL/min/1 73 square meters)    Stage 5 End Stage CKD (GFR <15 mL/min/1 73 square meters)  Note: GFR calculation is accurate only with a steady state creatinine    UA w Reflex to Microscopic [[id number]]  (Abnormal) Collected:  10/01/19 1243    Lab Status:  Final result Specimen:  Urine, Clean Catch Updated:  10/01/19 1257     Color, UA Yellow     Clarity, UA Cloudy     Specific Gravity, UA 1 015     pH, UA 7 0     Leukocytes, UA Negative     Nitrite, UA Negative     Protein, UA 30 (1+) mg/dl      Glucose, UA Negative mg/dl      Ketones, UA Negative mg/dl      Urobilinogen, UA 0 2 E U /dl      Bilirubin, UA Negative     Blood, UA Negative    Urine culture [[id number]] Collected:  10/01/19 1243    Lab Status: In process Specimen:  Urine, Clean Catch Updated:  10/01/19 1253    CBC and differential [[id number]] Collected:  10/01/19 1229    Lab Status:  Final result Specimen:  Blood from Arm, Left Updated:  10/01/19 1242     WBC 6 77 Thousand/uL      RBC 4 37 Million/uL      Hemoglobin 13 2 g/dL      Hematocrit 41 2 %      MCV 94 fL      MCH 30 2 pg      MCHC 32 0 g/dL      RDW 13 8 %      MPV 10 9 fL      Platelets 723 Thousands/uL      nRBC 0 /100 WBCs      Neutrophils Relative 61 %      Immat GRANS % 0 %      Lymphocytes Relative 31 %      Monocytes Relative 7 %      Eosinophils Relative 1 %      Basophils Relative 0 %      Neutrophils Absolute 4 11 Thousands/µL      Immature Grans Absolute 0 02 Thousand/uL      Lymphocytes Absolute 2 09 Thousands/µL      Monocytes Absolute 0 45 Thousand/µL      Eosinophils Absolute 0 08 Thousand/µL      Basophils Absolute 0 02 Thousands/µL     Blood culture #1 [[id number]] Collected:  10/01/19 1220    Lab Status: In process Specimen:  Blood from Arm, Left Updated:  10/01/19 1239    Blood culture #2 [[id number]] Collected:  10/01/19 1229    Lab Status:   In process Specimen:  Blood from Arm, Right Updated:  10/01/19 1239                 XR chest 2 views   Final Result by Grisel Martinez MD (10/01 1401)      Findings suggestive of trace amounts of pleural fluid  Clear lungs            Workstation performed: [id number]                    Procedures  ECG 12 Lead Documentation Only  Performed by: Ran Delgado DO  Authorized by: Ran Delgado DO     ECG reviewed by me, the ED Provider: yes    Patient location:  ED  Previous ECG:     Previous ECG:  Unavailable    Comparison to cardiac monitor: Yes    Interpretation:     Interpretation: non-specific    Rate:     ECG rate assessment: normal    Rhythm:     Rhythm: sinus rhythm    Ectopy:     Ectopy: none    QRS:     QRS axis:  Normal  Conduction:     Conduction: normal    ST segments:     ST segments:  Non-specific  T waves:     T waves: non-specific             ED Course                               MDM  Number of Diagnoses or Management Options  Cough:   Dyspnea:   Diagnosis management comments: Patient evaluated with labs EKG imaging  I reviewed the results and discussed them with the patient and family  Swallow therapist evaluated the patient at bedside  Gave necessary instructions  Patient discharged with appropriate instructions medications and follow-up  Patient and  verbalized understanding had no further questions at the time of discharge  Patient had stable vital signs and well-appearing at the time of discharge         Amount and/or Complexity of Data Reviewed  Clinical lab tests: ordered and reviewed  Tests in the radiology section of CPT®: ordered and reviewed  Tests in the medicine section of CPT®: ordered and reviewed    Patient Progress  Patient progress: stable      Disposition  Final diagnoses:   Dyspnea   Cough     Time reflects when diagnosis was documented in both MDM as applicable and the Disposition within this note     Time User Action Codes Description Comment    10/1/2019  3:12 PM Anna Bowser Add [R06 00] Dyspnea     10/1/2019  3:13 PM Anjum Bowser Add [R05] Cough       ED Disposition     ED Disposition Condition Date/Time Comment    Discharge Stable Tue Oct 1, 2019  3:12 PM Jennifer Anderson discharge to home/self care              Follow-up Information     Follow up With Specialties Details Why Contact Info Additional [facility] Internal Medicine Schedule an appointment as soon as possible for a visit   [address] [facility] [address]  [address] [facility] Emergency Department Emergency Medicine  If symptoms worsen [address] [facility] ED, [place], [place], [address]          Discharge Medication List as of 10/1/2019  3:13 PM      CONTINUE these medications which have NOT CHANGED    Details   Ascorbic Acid (VITAMIN C PO) Take 1 tablet by mouth daily, Historical Med      aspirin 81 mg chewable tablet Chew 1 tablet (81 mg total) daily, Starting Thu 4/25/2019, No Print      azelastine (ASTELIN) 0 1 % nasal spray 1 spray into each nostril 2 (two) times a day Use in each nostril as directed, Starting Sat 11/24/2018, Normal      bisacodyl (DULCOLAX) 10 mg suppository Insert 1 suppository (10 mg total) into the rectum daily as needed for constipation, Starting Wed 4/24/2019, No Print      carbidopa-levodopa (SINEMET CR)  mg TBCR per ER tablet Take 2 tablets by mouth 4 (four) times a day, Starting Tue 6/4/2019, No Print      carbidopa-levodopa (SINEMET)  mg per tablet Take 0 5 tablets by mouth 4 (four) times a day, Starting Tue 6/4/2019, No Print      Cholecalciferol (VITAMIN D PO) Take 1 tablet by mouth daily, Historical Med      Coenzyme Q10 (COQ10 PO) Take by mouth, Historical Med      cyanocobalamin 1000 MCG tablet Take 1 tablet (1,000 mcg total) by mouth daily, Starting Thu 4/25/2019, No Print      dextromethorphan-guaiFENesin (ROBITUSSIN DM)  mg/5 mL syrup Take 10 mL by mouth every 4 (four) hours as needed for cough, Starting Wed 4/24/2019, No Print      docusate sodium (COLACE) 100 mg capsule Take 1 capsule (100 mg total) by mouth 2 (two) times a day, Starting Wed 4/24/2019, No Print      Levodopa (INBRIJA) 42 MG CAPS Orally inhale contents fo 2 capsules prn off period (Do not exceed 2 doses daily), No Print      metoprolol tartrate (LOPRESSOR) 25 mg tablet Take 0 5 tablets (12 5 mg total) by mouth every 12 (twelve) hours, Starting Wed 4/24/2019, No Print      polyvinyl alcohol (LIQUIFILM TEARS) 1 4 % ophthalmic solution Administer 1 drop to both eyes every 4 (four) hours as needed for dry eyes, Starting Wed 4/24/2019, No Print      progesterone (PROMETRIUM) 200 MG capsule Take 200 mg by mouth daily at bedtime  , Historical Med      saccharomyces boulardii (FLORASTOR) 250 mg capsule Take 1 capsule (250 mg total) by mouth 2 (two) times a day, Starting Wed 4/24/2019, No Print      thyroid desiccated (ARMOUR) 30 mg tablet Take 15 mg by mouth daily , Historical Med           No discharge procedures on file      ED Provider  Electronically Signed by           Sondra Rodríguez DO  10/01/19 5516

## 2019-10-01 NOTE — SPEECH THERAPY NOTE
Speech Language/Pathology  Today's Date: 10/1/2019     Problem List  Patient Active Problem List   Diagnosis    Paroxysmal A-[address] [facility] [address]    Parkinson's disease ([facility] [address] [facility]    Hypothyroidism    Lethargy    Hyponatremia    Essential hypertension    Falls frequently    Influenza B    Acute metabolic encephalopathy    Weight loss    Gait abnormality    Arthritis    Thyroid disease    Acute pyelonephritis    Elevated alkaline phosphatase level    Hydronephrosis    Hiatal hernia    Esophageal dilatation    Other dysphagia       Past Medical History  Past Medical History:   Diagnosis Date    Arthritis     Aspiration into airway     Hypothyroidism     Parkinson's disease [facility]     Thyroid disease        Past Surgical History  Past Surgical History:   Procedure Laterality Date    [place] CYSTOURETHROSCOPY,URETER CATHETER Left 2018    Procedure: CYSTOSCOPY RETROGRADE PYELOGRAM WITH INSERTION STENT [address] DILATION OF STRICTURE;  Surgeon: Behzad Bejarano MD;  [facility]: [facility];  Service: Urology       Summary    Pt presents with known mild-mod oropharyngeal dysphagia with  rotary-like chew and delayed and effortful posterior transfer of the bolus with delayed swallow initiation  Pt was experiencing increased s/s of dysphagia this AM prior to sinemet with inability to take morning meds and breakfast  SLP discussed at great length that pt's swallow is and will always be inconsistent in regards to function/ability and pt will always be at risk for aspiration given her body will always produce saliva (which is a thin liquid) See tx note for additional details re husbands questions/concerns and education provided       Recommendations:   Diet: mechanically altered/level 2 diet and nectar thick liquids   Meds: whole with puree   Frequent Oral care: 4x/day  Aspiration precautions and compensatory swallowing strategies: upright posture, only feed when fully alert, slow rate of feeding, small bites/sips and alternating bites and sips  Other Recommendations/ considerations:   -F/u with GI MD to discuss husbands new desire to explore PEG Tube (already has apt)  -VBS with speech as OP as indicated if swallow function continues to decline with increase overt s/s of dysphagia/aspiration        Current Medical Status  Pt is a 80 y o  female who presented to [facility] with coughing and SOB that has been increasing x1 month   and caregiver were both bedside  Pt is on NTL and finely minced solids  She has had both a FEES and MBS done in the past and is known to silently aspirate thin liquids and have overflow penetration/aspiration of pharyngeal retention with cough response   has been educated re PD progression and PEG TF option in past but has not accepted such  He is now very concerned and had a large amount of questions re swallowing, PO intake, nutrition and PEG TF  Past medical history:   Please see H&P for details    Special Studies:  CXR 10/1/19-Findings suggestive of trace amounts of pleural fluid    Clear lungs    Social/Education/Vocational Hx:  Pt lives with family    Swallow Information   Current Risks for Dysphagia & Aspiration: parkinsonn's disease for 12 years  Current Symptoms/Concerns: coughing with po, wet vocal quality and change in respiratory status  Current Diet: NPO   Baseline Diet: mechanically altered/level 2 diet and nectar thick liquids    Baseline Assessment   Behavior/Cognition: waxing and waning arousal level  Speech/Language Status: able to participate in basic conversation and able to follow commands  Patient Positioning: upright in bed     Swallow Mechanism Exam   Facial: masked facies  Labial: bilateral decreased ROM  Lingual: decreased ROM and bilateral decreased strength  Velum: unable to visualize  Mandible:  decreased ROM  Dentition: adequate  Vocal quality:weak and dysphonic   Volitional Cough: weak   Respiratory: RA    Consistencies Assessed and Performance   Consistencies Administered: nectar thick, honey thick, puree and mechanical soft solids    Oral Stage: Pt self fed w/o difficulty  Oral control and posterior transfer of all consistencies appeared decreased and effortful with delayed transfer  Mastication of mech soft solids was adequate/strong  Pharyngeal Stage: MODERATELY delayed swallow initiation with effortful swallow  Decreased hyolaryngeal rise felt via palpation  Multiple swallows noted across consistencies which pt was trained to do  Delayed coughing noted at rest x1 after PO trials have stopped  Discussed at length that it could be related to PO but swallow does appear better vs reported experiences this AM prior to meds  Educated on presence of saliva as well         Esophageal Concerns: none reported      Results Reviewed with: patient, RN, MD and family   Dysphagia Goals: pt will tolerate mechanical solids with NTL without s/s of aspiration x1-5 days  Discharge recommendation: outpt    Speech Therapy Prognosis   Prognosis: fair    Prognosis Considerations: age, medical status, prior medical history, cognitive status, progressive disease process and therapeutic potential      Cody WALKER  [address] Pathologist  Available via [facility] [address] #[id number]  [place] #[id number]

## 2019-10-04 NOTE — TELEPHONE ENCOUNTER
Patient with history of Parkinson's disease, dysphagia, aspiration pneumonia   Jessee Herring is calling explaining that his wife has Parkinson's disease and was just in the hospital for dyspnea  She has good days and bad days and was feeling better when she was discharged  Last night she woke 3 time with coughing from choking on her phlegm  She is losing weight because she cannot swallow, not even her own phlegm  Jessee Herring, the doctors and his daughter have discussed a PEG tube for his wife many times  It is scheduled for 10/16  Jessee Herring wants to move it up ASAP  He sees his wife declining and is desperate to have the tube placed to save her life  I was gong to suggest he go to the ER, but I was not sure that was the way to go  He is begging us to help his wife and their family  Is there any way it can be done today or tomorrow? Hobert Number Dr Edil Meadows

## 2019-10-04 NOTE — TELEPHONE ENCOUNTER
Patients GI provider:  Dr Shyann Burns    Number to return call: ( [phone]  braulio     Reason for call: Pt's  called to see if he can get a call back to discuss his wife getting a feeding tube   She currently has pneumonia and a bad cough and can't keep food down    Scheduled procedure/appointment date if applicable: Apt/procedure 10-16-19 office appt

## 2019-10-04 NOTE — TELEPHONE ENCOUNTER
Patient with history of Parkinson's disease, dysphagia, aspiration pneumonia   Soledad Ross is calling explaining that his wife has Parkinson's disease and was just in the hospital for dyspnea  She has good days and bad days and was feeling better when she was discharged  Last night she woke 3 time with coughing from choking on her phlegm  She is losing weight because she cannot swallow, not even her own phlegm  Soledad Ross, the doctors and his daughter have discussed a PEG tube for his wife many times  It is scheduled for 10/16  Soledad Ross wants to move it up ASAP  He sees his wife declining and is desperate to have the tube placed to save her life  I was gong to suggest he go to the ER, but I was not sure that was the way to go  He is begging us to help his wife and their family  Is there any way the PEG tube insertion can be done today or tomorrow?     Lara Mayer

## 2019-10-04 NOTE — TELEPHONE ENCOUNTER
Macy Rodrigez assisted to schedule Zachary Mcleod for an earlier appointment with Dr Curvin Duane at [facility] on Monday at 1:40 PM  Appt for 10/16 cancelled   happy the appointment was moved up   said if Zachary Mcleod gets any worse over the weekend, he will take her back to the ER

## 2019-10-07 NOTE — PROGRESS NOTES
[facility]  Temo Mckoy 80 y o  female MRN: [id number]            Assessment & Plan:    Pleasant 20-year-old female with a history of Parkinson's disease, or pharyngeal dysphagia, possible underlying esophageal dysfunction with dysphagia, aspiration risk, failed swallow evaluation  1  Esophageal and oropharyngeal dysphagia:  Patient has not had a complete esophageal evaluation, unclear if he has underlying distal esophageal strictures, stenosis, dysmotility such as achalasia  -had extensive discussion with the patient and family with regards to safety of PEG placement, upper endoscopy can and necessity for further evaluation of her esophagus  -recommend that we plan to proceed with an upper endoscopy, and if a appropriate at that time proceed with a PEG placement otherwise consider further evaluation based on findings at the time of her endoscopy  -discussed with her the risks of procedure including bleeding infection surgery  -she will likely need to be admitted overnight for observation, as well as for nursing education for PEG tube placement                _____________________________________________________________        CC:  Dysphagia    HPI:  Temo Mckoy is a 80 y  o female who is here for dysphagia  As you know this is an 20-year-old female with a history of Parkinson's disease, she has had oropharyngeal dysphagia, never had esophageal evaluation  Patient has had frequent nocturnal regurgitation, coughing, concern for aspiration  She has had progressive weight loss, hypoalbuminemia  Failed speech and swallow therapy, is on thickened liquids but continues to have oropharyngeal dysphagia  The patient is here for further follow-up discussion for PEG tube placement  ROS:  The remainder of the ROS was negative except for the pertinent positives mentioned in HPI  Allergies: Patient has no known allergies      Medications:   Current Outpatient Medications:    Ascorbic Acid (VITAMIN C PO), Take 1 tablet by mouth daily, Disp: , Rfl:     bisacodyl (DULCOLAX) 10 mg suppository, Insert 1 suppository (10 mg total) into the rectum daily as needed for constipation, Disp: 12 suppository, Rfl: 0    carbidopa-levodopa (SINEMET CR)  mg TBCR per ER tablet, Take 2 tablets by mouth 4 (four) times a day, Disp: 240 tablet, Rfl: 4    carbidopa-levodopa (SINEMET)  mg per tablet, Take 0 5 tablets by mouth 4 (four) times a day, Disp: 120 tablet, Rfl: 3    Cholecalciferol (VITAMIN D PO), Take 1 tablet by mouth daily, Disp: , Rfl:     Coenzyme Q10 (COQ10 PO), Take by mouth, Disp: , Rfl:     cyanocobalamin 1000 MCG tablet, Take 1 tablet (1,000 mcg total) by mouth daily, Disp: , Rfl: 0    guaiFENesin (ROBITUSSIN) 100 MG/5ML oral liquid, Take 5-10 mL (100-200 mg total) by mouth every 4 (four) hours as needed for cough for up to 5 days, Disp: 60 mL, Rfl: 0    Levodopa (INBRIJA) 42 MG CAPS, Orally inhale contents fo 2 capsules prn off period (Do not exceed 2 doses daily), Disp: 60 capsule, Rfl: 11    polyvinyl alcohol (LIQUIFILM TEARS) 1 4 % ophthalmic solution, Administer 1 drop to both eyes every 4 (four) hours as needed for dry eyes (Patient taking differently: Administer 1 drop to both eyes as needed for dry eyes ), Disp: 15 mL, Rfl: 0    progesterone (PROMETRIUM) 200 MG capsule, Take 200 mg by mouth daily at bedtime  , Disp: , Rfl:     Respiratory Therapy Supplies (NEBULIZER/TUBING/MOUTHPIECE) KIT, by Does not apply route, Disp: , Rfl:     saccharomyces boulardii (FLORASTOR) 250 mg capsule, Take 1 capsule (250 mg total) by mouth 2 (two) times a day, Disp: , Rfl: 0    thyroid desiccated (ARMOUR) 30 mg tablet, Take 15 mg by mouth daily , Disp: , Rfl:     aspirin 81 mg chewable tablet, Chew 1 tablet (81 mg total) daily (Patient not taking: Reported on 6/4/2019), Disp: , Rfl: 0    azelastine (ASTELIN) 0 1 % nasal spray, 1 spray into each nostril 2 (two) times a day Use in each nostril as directed (Patient not taking: Reported on 7/29/2019), Disp: 30 mL, Rfl: 0    dextromethorphan-guaiFENesin (ROBITUSSIN DM)  mg/5 mL syrup, Take 10 mL by mouth every 4 (four) hours as needed for cough (Patient not taking: Reported on 6/4/2019), Disp: 118 mL, Rfl: 0    docusate sodium (COLACE) 100 mg capsule, Take 1 capsule (100 mg total) by mouth 2 (two) times a day (Patient not taking: Reported on 7/29/2019), Disp: 10 capsule, Rfl: 0    metoprolol tartrate (LOPRESSOR) 25 mg tablet, Take 0 5 tablets (12 5 mg total) by mouth every 12 (twelve) hours (Patient not taking: Reported on 6/4/2019), Disp: , Rfl: 0    Past Medical History:   Diagnosis Date    Arthritis     Aspiration into airway     Aspiration pneumonia (HCC)     Hypothyroidism     Parkinson's disease ([address] )     Thyroid disease        Past Surgical History:   Procedure Laterality Date    AL CYSTOURETHROSCOPY,URETER CATHETER Left 11/23/2018    Procedure: CYSTOSCOPY RETROGRADE PYELOGRAM WITH INSERTION STENT [address];  Surgeon: Keri Landry MD;  Location: [facility];  Service: Urology       Family History   Problem Relation Age of Onset    Hypertension Mother     Lung cancer Father         reports that she has never smoked  She has never used smokeless tobacco  She reports that she does not drink alcohol or use drugs        Physical Exam:    /76 (BP Location: Left arm, Patient Position: Sitting, Cuff Size: Standard)   Pulse 72   Temp 97 6 °F (36 4 °C)   Ht 5' 1" (1 549 m)   Wt 45 4 kg (100 lb 2 oz)   BMI 18 92 kg/m²     Gen: wn/wd, NAD, elderly female in a wheelchair  HEENT: anicteric, MMM, no cervical LAD  CVS: RRR, no m/r/g  CHEST: CTA b/l  ABD: +BS, soft, NT,ND, no hepatosplenomegaly  EXT: no c/c/e  NEURO: aaox3  SKIN: NO rashes

## 2019-10-07 NOTE — LETTER
October 7, 2019     Emma Dominguez  [address] [place] [address]    Patient: Magdi Valentino   YOB: 1933   Date of Visit: 10/7/2019       Dear Dr Shey Gardner:    Thank you for referring Quinton Rodriguez to me for evaluation  Below are my notes for this consultation  If you have questions, please do not hesitate to call me  I look forward to following your patient along with you  Sincerely,        Saleem Dorman MD        CC: No Recipients  Saleem Dorman MD  10/7/2019  3:07 PM  Sign at close encounter  [facility]) [facility]  Magdi Valentino 80 y o  female MRN: [id number]            Assessment & Plan:    Pleasant 78-year-old female with a history of Parkinson's disease, or pharyngeal dysphagia, possible underlying esophageal dysfunction with dysphagia, aspiration risk, failed swallow evaluation  1  Esophageal and oropharyngeal dysphagia:  Patient has not had a complete esophageal evaluation, unclear if he has underlying distal esophageal strictures, stenosis, dysmotility such as achalasia  -had extensive discussion with the patient and family with regards to safety of PEG placement, upper endoscopy can and necessity for further evaluation of her esophagus  -recommend that we plan to proceed with an upper endoscopy, and if a appropriate at that time proceed with a PEG placement otherwise consider further evaluation based on findings at the time of her endoscopy  -discussed with her the risks of procedure including bleeding infection surgery  -she will likely need to be admitted overnight for observation, as well as for nursing education for PEG tube placement                _____________________________________________________________        CC:  Dysphagia    HPI:  Magdi Valentino is a 80 y  o female who is here for dysphagia    As you know this is an 78-year-old female with a history of Parkinson's disease, she has had oropharyngeal dysphagia, never had esophageal evaluation  Patient has had frequent nocturnal regurgitation, coughing, concern for aspiration  She has had progressive weight loss, hypoalbuminemia  Failed speech and swallow therapy, is on thickened liquids but continues to have oropharyngeal dysphagia  The patient is here for further follow-up discussion for PEG tube placement  ROS:  The remainder of the ROS was negative except for the pertinent positives mentioned in HPI  Allergies: Patient has no known allergies      Medications:   Current Outpatient Medications:     Ascorbic Acid (VITAMIN C PO), Take 1 tablet by mouth daily, Disp: , Rfl:     bisacodyl (DULCOLAX) 10 mg suppository, Insert 1 suppository (10 mg total) into the rectum daily as needed for constipation, Disp: 12 suppository, Rfl: 0    carbidopa-levodopa (SINEMET CR)  mg TBCR per ER tablet, Take 2 tablets by mouth 4 (four) times a day, Disp: 240 tablet, Rfl: 4    carbidopa-levodopa (SINEMET)  mg per tablet, Take 0 5 tablets by mouth 4 (four) times a day, Disp: 120 tablet, Rfl: 3    Cholecalciferol (VITAMIN D PO), Take 1 tablet by mouth daily, Disp: , Rfl:     Coenzyme Q10 (COQ10 PO), Take by mouth, Disp: , Rfl:     cyanocobalamin 1000 MCG tablet, Take 1 tablet (1,000 mcg total) by mouth daily, Disp: , Rfl: 0    guaiFENesin (ROBITUSSIN) 100 MG/5ML oral liquid, Take 5-10 mL (100-200 mg total) by mouth every 4 (four) hours as needed for cough for up to 5 days, Disp: 60 mL, Rfl: 0    Levodopa (INBRIJA) 42 MG CAPS, Orally inhale contents fo 2 capsules prn off period (Do not exceed 2 doses daily), Disp: 60 capsule, Rfl: 11    polyvinyl alcohol (LIQUIFILM TEARS) 1 4 % ophthalmic solution, Administer 1 drop to both eyes every 4 (four) hours as needed for dry eyes (Patient taking differently: Administer 1 drop to both eyes as needed for dry eyes ), Disp: 15 mL, Rfl: 0    progesterone (PROMETRIUM) 200 MG capsule, Take 200 mg by mouth daily at bedtime  , Disp: , Rfl:    Respiratory Therapy Supplies (NEBULIZER/TUBING/MOUTHPIECE) KIT, by Does not apply route, Disp: , Rfl:     saccharomyces boulardii (FLORASTOR) 250 mg capsule, Take 1 capsule (250 mg total) by mouth 2 (two) times a day, Disp: , Rfl: 0    thyroid desiccated (ARMOUR) 30 mg tablet, Take 15 mg by mouth daily , Disp: , Rfl:     aspirin 81 mg chewable tablet, Chew 1 tablet (81 mg total) daily (Patient not taking: Reported on 6/4/2019), Disp: , Rfl: 0    azelastine (ASTELIN) 0 1 % nasal spray, 1 spray into each nostril 2 (two) times a day Use in each nostril as directed (Patient not taking: Reported on 7/29/2019), Disp: 30 mL, Rfl: 0    dextromethorphan-guaiFENesin (ROBITUSSIN DM)  mg/5 mL syrup, Take 10 mL by mouth every 4 (four) hours as needed for cough (Patient not taking: Reported on 6/4/2019), Disp: 118 mL, Rfl: 0    docusate sodium (COLACE) 100 mg capsule, Take 1 capsule (100 mg total) by mouth 2 (two) times a day (Patient not taking: Reported on 7/29/2019), Disp: 10 capsule, Rfl: 0    metoprolol tartrate (LOPRESSOR) 25 mg tablet, Take 0 5 tablets (12 5 mg total) by mouth every 12 (twelve) hours (Patient not taking: Reported on 6/4/2019), Disp: , Rfl: 0    Past Medical History:   Diagnosis Date    Arthritis     Aspiration into airway     Aspiration pneumonia (HCC)     Hypothyroidism     [facility]'s [facility] [address] [address]     Thyroid disease        Past Surgical History:   Procedure Laterality Date    NH CYSTOURETHROSCOPY,URETER CATHETER Left 11/23/2018    Procedure: CYSTOSCOPY RETROGRADE PYELOGRAM WITH INSERTION STENT [address];  Surgeon: Dwight Gold MD;  Location: [facility];  Service: Urology       Family History   Problem Relation Age of Onset    Hypertension Mother     Lung cancer Father         reports that she has never smoked  She has never used smokeless tobacco  She reports that she does not drink alcohol or use drugs        Physical Exam:    /76 (BP Location: Left arm, Patient Position: Sitting, Cuff Size: Standard)   Pulse 72   Temp 97 6 °F (36 4 °C)   Ht 5' 1" (1 549 m)   Wt 45 4 kg (100 lb 2 oz)   BMI 18 92 kg/m²      Gen: wn/wd, NAD, elderly female in a wheelchair  HEENT: anicteric, MMM, no cervical LAD  CVS: RRR, no m/r/g  CHEST: CTA b/l  ABD: +BS, soft, NT,ND, no hepatosplenomegaly  EXT: no c/c/e  NEURO: aaox3  SKIN: NO rashes

## 2019-10-09 NOTE — PRE-PROCEDURE INSTRUCTIONS
Pre-Surgery Instructions:   Medication Instructions    ACETYLCYSTEINE IN Instructed patient per Anesthesia Guidelines   Ascorbic Acid (VITAMIN C PO) Patient was instructed by Physician and understands   azelastine (ASTELIN) 0 1 % nasal spray Patient was instructed by Physician and understands   bisacodyl (DULCOLAX) 10 mg suppository Patient was instructed by Physician and understands   carbidopa-levodopa (SINEMET CR)  mg TBCR per ER tablet Instructed patient per Anesthesia Guidelines   carbidopa-levodopa (SINEMET)  mg per tablet Instructed patient per Anesthesia Guidelines   Cholecalciferol (VITAMIN D PO) Patient was instructed by Physician and understands   Coenzyme Q10 (COQ10 PO) Patient was instructed by Physician and understands   cyanocobalamin 1000 MCG tablet Patient was instructed by Physician and understands   dextromethorphan-guaiFENesin (ROBITUSSIN DM)  mg/5 mL syrup Patient was instructed by Physician and understands   docusate sodium (COLACE) 100 mg capsule Patient was instructed by Physician and understands   Levodopa (INBRIJA) 42 MG CAPS Instructed patient per Anesthesia Guidelines   polyvinyl alcohol (LIQUIFILM TEARS) 1 4 % ophthalmic solution Patient was instructed by Physician and understands   Respiratory Therapy Supplies (NEBULIZER/TUBING/MOUTHPIECE) KIT Instructed patient per Anesthesia Guidelines   saccharomyces boulardii (FLORASTOR) 250 mg capsule Patient was instructed by Physician and understands   thyroid desiccated (ARMOUR) 30 mg tablet Instructed patient per Anesthesia Guidelines

## 2019-10-10 NOTE — TELEPHONE ENCOUNTER
Call from pt's   States that pt is going to hospital tomorrow for her PEG tube placement  Wondering what to do about her Sinemet CR since it cannot be crushed and she will be getting her medications through her PEG tube now       Taking     carbidopa-levodopa (SINEMET)  mg- 0 5 tablets by mouth 4 (four) times a day     carbidopa-levodopa (SINEMET CR)  mg  2 tablets by mouth 4 (four) times a day      Please advise      Call back number [phone] to leave detailed message (home phone) or can call cell phone, but DO NOT leave a message [phone]

## 2019-10-11 PROBLEM — J69.0 ASPIRATION PNEUMONIA (HCC): Status: ACTIVE | Noted: 2019-01-01

## 2019-10-11 PROBLEM — R03.0 ELEVATED BLOOD PRESSURE READING: Status: ACTIVE | Noted: 2019-01-01

## 2019-10-11 NOTE — CONSULTS
Consultation - Pulmonary Medicine   Tiera Finley 80 y o  female MRN: [id number]  Unit/Bed#: [address] [place] [address] A Encounter: [id number]      Assessment:  Aspiration pneumonia  Patient has Parkinson's disease with known oral pharyngeal dysphagia  Parkinson's disease  Aside from aspiration patient may also have difficulty clearing or pharyngeal secretions due to her Parkinson's disease  History of paroxysmal atrial fibrillation    Plan:   I concur of IV ceftriaxone and Flagyl for aspiration pneumonitis  Neb treatments with DuoNeb and Mucomyst q i d  Oxygen 2 liters/minute and monitor O2 saturations  Elected PEG tube placement scheduled today has been postponed because of patient's oxygen desaturation and pneumonia  Patient is on dysphagia 1 pureed diet with honey thick liquids  Speech therapy consult for swallow function also ordered  History of Present Illness   Physician Requesting Consult: Jessica Savage MD  Reason for Consult / Principal Problem: aspiration pneumonia  Hx and PE limited by:  Patient has Parkinson's disease' lethargic    HPI: Tiera Finley is a 80y o  year old female who initially presented to have outpatient endoscopy with PEG placement today however patient in the preop area is noted have rhonchorous breath sounds and some mild hypoxemia  She was placed on 2 L of oxygen to maintain O2 saturation 93% or better     She has Parkinson's disease and known oral pharyngeal dysphagia  Her caretaker says patient has difficulty swallowing foods and pills  Patient is not able to really ambulate because of her Parkinson's disease and overall condition  Caretaker states that day just started given patient nebulized treatment with seal cystine alone at home because of some chest congestion  No fever chills  Patient does get intermittent cough  Patient was seen in ER on October 1st because of shortness of breath and chest congestion as she had for greater than 1 month    Patient had been treated as outpatient with amoxicillin her nighttime for concern of aspiration tracheobronchitis  She was afebrile at that time and room air O2 saturation was 98%    No report of any fever for the past couple days  Patient does have some intermittent shortness of breath as per caretaker  She is not on any oxygen at home  Chest AP and lateral done on October 1st showed some very small bilateral pleural effusions but no distinct infiltrates  Chest x-ray done this portable today shows some small right pleural effusion  There is some small alveolar right lower lobe infiltrate  Blood work shows white blood cell count of 5 42  Hemoglobin is 14 1 and platelet count is 568  There 67% neutrophils  Serum sodium is 140 and creatinine is 0 81,    Patient has been started on IV ceftriaxone and Flagyl for aspiration pneumonia  Also nebulizer treatments with DuoNeb with 3 mL of 20% acetylcysteine q 6 hours     She is on 2 L of oxygen and O2 saturation is 96%  Patient has history of aspiration pneumonia, hypothyroidism, Parkinson's disease  She is a nonsmoker      Review of Systems   Constitutional: Negative for diaphoresis and fever  HENT: Negative for congestion and rhinorrhea  Eyes: Negative for redness  Respiratory: Positive for cough  Negative for wheezing  Cardiovascular: Negative for chest pain and leg swelling  Gastrointestinal: Negative for abdominal distention and abdominal pain  Endocrine: Negative for polydipsia  Genitourinary: Negative for hematuria  Musculoskeletal: Negative for joint swelling and myalgias  Neurological: Negative for light-headedness  Psychiatric/Behavioral: Negative for agitation         Historical Information   Past Medical History:   Diagnosis Date    Arthritis     Aspiration into airway     Aspiration pneumonia (HCC)     Hypertension     Hypothyroidism     [facility]'s disease ([facility] [address] )     Thyroid disease      Past Surgical History:   Procedure Laterality Date    LA CYSTOURETHROSCOPY,URETER CATHETER Left 11/23/2018    Procedure: CYSTOSCOPY RETROGRADE PYELOGRAM WITH INSERTION STENT Wanda Tate OF STRICTURE;  Surgeon: Juan Mckeon MD;  Location: [facility];  Service: Urology     Social History   Social History     Substance and Sexual Activity   Alcohol Use Never    Frequency: Never     Social History     Substance and Sexual Activity   Drug Use No     Social History     Tobacco Use   Smoking Status Never Smoker   Smokeless Tobacco Never Used         Family History:   Family History   Problem Relation Age of Onset    Hypertension Mother     Lung cancer Father        Meds/Allergies     Current Facility-Administered Medications:     acetaminophen (TYLENOL) tablet 650 mg, 650 mg, Oral, Q6H PRN, Laurel lAarcon MD    acetylcysteine (MUCOMYST) 200 mg/mL inhalation solution 600 mg, 3 mL, Nebulization, Q6H, Laurel Alarcon MD    ascorbic acid (VITAMIN C) tablet 250 mg, 250 mg, Oral, Daily, Laurel Alarcon MD, 250 mg at 10/11/19 1513    azelastine (ASTELIN) 0 1 % nasal spray 1 spray, 1 spray, Each Nare, BID, Laurel Alarcon MD, 1 spray at 10/11/19 1702    bisacodyl (DULCOLAX) rectal suppository 10 mg, 10 mg, Rectal, Daily PRN, Laurel Alarcon MD    carbidopa-levodopa (SINEMET CR)  mg per ER tablet 2 tablet, 2 tablet, Oral, 4x Daily, Laurel Alarcon MD, 2 tablet at 10/11/19 1658    carbidopa-levodopa (SINEMET)  mg per tablet 0 5 tablet, 0 5 tablet, Oral, 4x Daily, Laurle Alarcon MD, 0 5 tablet at 10/11/19 1657    cefTRIAXone (ROCEPHIN) IVPB (premix) 1,000 mg, 1,000 mg, Intravenous, Q24H, Laurel Alarcon MD, Last Rate: 100 mL/hr at 10/11/19 1545, 1,000 mg at 10/11/19 1545    cholecalciferol (VITAMIN D3) tablet 400 Units, 400 Units, Oral, Daily, Laurel Alarcon MD, 400 Units at 10/11/19 1657    cyanocobalamin (VITAMIN B-12) tablet 1,000 mcg, 1,000 mcg, Oral, Daily, Laurel Alarocn MD, 1,000 mcg at 10/11/19 5276   dextran 70-hypromellose (GENTEAL TEARS) 0 1-0 3 % ophthalmic solution 1 drop, 1 drop, Both Eyes, Q3H PRN, Nohemy Payton MD    dextromethorphan-guaiFENesin (ROBITUSSIN DM)  mg/5 mL oral syrup 10 mL, 10 mL, Oral, Q4H PRN, Nohemy Payton MD    docusate sodium (COLACE) capsule 100 mg, 100 mg, Oral, BID, Nohemy Payton MD, 100 mg at 10/11/19 1702    enoxaparin (LOVENOX) subcutaneous injection 40 mg, 40 mg, Subcutaneous, Daily, Nohemy Payton MD    hydrALAZINE (APRESOLINE) injection 5 mg, 5 mg, Intravenous, Q6H PRN, Nohemy Payton MD    ipratropium-albuterol (DUO-NEB) 0 5-2 5 mg/3 mL inhalation solution 3 mL, 3 mL, Nebulization, Q6H, Monika Haro MD, 3 mL at 10/11/19 1516    metroNIDAZOLE (FLAGYL) IVPB (premix) 500 mg, 500 mg, Intravenous, Q8H, Monika Haro MD, Last Rate: 200 mL/hr at 10/11/19 1500, 500 mg at 10/11/19 1500    multi-electrolyte (PLASMALYTE-A/ISOLYTE-S PH 7 4) IV solution, 50 mL/hr, Intravenous, Continuous, Monika Haro MD, Last Rate: 50 mL/hr at 10/11/19 1653, 50 mL/hr at 10/11/19 1653    ondansetron (ZOFRAN) injection 4 mg, 4 mg, Intravenous, Q6H PRN, Nohemy Payton MD    saccharomyces boulardii (FLORASTOR) capsule 250 mg, 250 mg, Oral, BID, Nohemy Payton MD, 250 mg at 10/11/19 1702    thyroid desiccated (ARMOUR) tablet 15 mg, 15 mg, Oral, Daily, Nohemy Payton MD    Prior to Admission medications    Medication Sig Start Date End Date Taking?  Authorizing Provider   Ascorbic Acid (VITAMIN C PO) Take 1 tablet by mouth daily   Yes Historical Provider, MD   azelastine (ASTELIN) 0 1 % nasal spray 1 spray into each nostril 2 (two) times a day Use in each nostril as directed 11/24/18  Yes Nohemy Payton MD   bisacodyl (DULCOLAX) 10 mg suppository Insert 1 suppository (10 mg total) into the rectum daily as needed for constipation 4/24/19  Yes Monica Eason DO   carbidopa-levodopa (SINEMET CR)  mg TBCR per ER tablet Take 2 tablets by mouth 4 (four) times a day 6/4/19  Yes Madeleine Fan PA-C   carbidopa-levodopa (SINEMET)  mg per tablet Take 0 5 tablets by mouth 4 (four) times a day 6/4/19  Yes Madeleine Fan PA-C   Cholecalciferol (VITAMIN D PO) Take 1 tablet by mouth daily   Yes Historical Provider, MD   Coenzyme Q10 (COQ10 PO) Take by mouth   Yes Historical Provider, MD   cyanocobalamin 1000 MCG tablet Take 1 tablet (1,000 mcg total) by mouth daily 4/25/19  Yes Haylee Plan, DO   dextromethorphan-guaiFENesin (ROBITUSSIN DM)  mg/5 mL syrup Take 10 mL by mouth every 4 (four) hours as needed for cough 4/24/19  Yes Monica Eason DO   docusate sodium (COLACE) 100 mg capsule Take 1 capsule (100 mg total) by mouth 2 (two) times a day 4/24/19  Yes Monica Eason, DO   polyvinyl alcohol (LIQUIFILM TEARS) 1 4 % ophthalmic solution Administer 1 drop to both eyes every 4 (four) hours as needed for dry eyes  Patient taking differently: Administer 1 drop to both eyes as needed for dry eyes  4/24/19  Yes Haylee Mason, DO   Respiratory Therapy Supplies (NEBULIZER/TUBING/MOUTHPIECE) KIT by Does not apply route   Yes Historical Provider, MD   saccharomyces boulardii (FLORASTOR) 250 mg capsule Take 1 capsule (250 mg total) by mouth 2 (two) times a day 4/24/19  Yes Monica Eason DO   thyroid desiccated (ARMOUR) 30 mg tablet Take 15 mg by mouth daily    Yes Historical Provider, MD   ACETYLCYSTEINE IN Inhale  10/11/19 Yes Historical Provider, MD   Levodopa (INBRIJA) 42 MG CAPS Orally inhale contents fo 2 capsules prn off period (Do not exceed 2 doses daily)  Patient not taking: Reported on 10/11/2019 7/30/19 10/11/19  Dominic Jones MD   metoprolol tartrate (LOPRESSOR) 25 mg tablet Take 0 5 tablets (12 5 mg total) by mouth every 12 (twelve) hours  Patient not taking: Reported on 6/4/2019 4/24/19 10/11/19  Monica Eason DO   progesterone (PROMETRIUM) 200 MG capsule Take 200 mg by mouth daily at bedtime    10/11/19  Historical Provider, MD       No Known Allergies    Objective   Vitals: Blood pressure 116/53, pulse 67, temperature 97 7 °F (36 5 °C), resp  rate 18, height 5' 1" (1 549 m), SpO2 97 %  ,Body mass index is 18 92 kg/m²  Intake/Output Summary (Last 24 hours) at 10/11/2019 1812  Last data filed at 10/11/2019 1800  Gross per 24 hour   Intake 900 ml   Output 100 ml   Net 800 ml     Invasive Devices     Peripheral Intravenous Line            Peripheral IV 10/11/19 Right Wrist less than 1 day          Drain            External Urinary Catheter less than 1 day                Physical Exam   Constitutional: She is oriented to person, place, and time  She appears well-developed and well-nourished  No distress  On 2 L of oxygen O2 saturation 96%  Patient is thin  Present non distressed  She is lethargic but arousable  She is cooperative  Patient is thin   HENT:   Head: Normocephalic  Nose: Nose normal    Mouth/Throat: Oropharynx is clear and moist  No oropharyngeal exudate  Eyes: Pupils are equal, round, and reactive to light  Conjunctivae are normal    Neck: Neck supple  No JVD present  No tracheal deviation present  Cardiovascular: Normal rate, regular rhythm and normal heart sounds  Pulmonary/Chest: Effort normal    There are some rhonchi in both lower lobes  No wheezes   Abdominal: Soft  She exhibits no distension  There is no tenderness  There is no guarding  Musculoskeletal: She exhibits no edema  Lymphadenopathy:     She has no cervical adenopathy  Neurological: She is alert and oriented to person, place, and time  Skin: Skin is warm and dry  No rash noted  Psychiatric: She has a normal mood and affect   Her behavior is normal  Thought content normal        Lab Results: ABG: No results found for: PHART, DSJ2JKK, PO2ART, GDO8WPG, M3RFZPGQ, BEART, SOURCE, BNP: No results found for: BNP, CBC:   Lab Results   Component Value Date    WBC 5 42 10/11/2019    HGB 14 1 10/11/2019    HCT 44 4 10/11/2019    MCV 95 10/11/2019     10/11/2019    ADJUSTEDWBC 6 60 02/28/2016    MCH 30 0 10/11/2019    MCHC 31 8 10/11/2019    RDW 13 9 10/11/2019    MPV 10 5 10/11/2019    NRBC 0 10/11/2019   , CMP:   Lab Results   Component Value Date     06/23/2015    K 4 0 10/11/2019    K 3 9 06/23/2015     10/11/2019     06/23/2015    CO2 31 10/11/2019    CO2 25 06/23/2015    ANIONGAP 6 06/23/2015    BUN 14 10/11/2019    BUN 22 06/23/2015    CREATININE 0 81 10/11/2019    CREATININE 0 75 06/23/2015    GLUCOSE 99 06/23/2015    CALCIUM 9 5 10/11/2019    CALCIUM 9 2 06/23/2015    AST 34 10/11/2019    ALT <6 (L) 10/11/2019    ALKPHOS 101 10/11/2019    EGFR 66 10/11/2019   , PT/INR:   Lab Results   Component Value Date    INR 1 04 10/11/2019   , Troponin: No results found for: TROPONIN    Imaging Studies: I have personally reviewed pertinent reports  and I have personally reviewed pertinent films in PACS    EKG, Pathology, and Other Studies: I have personally reviewed pertinent reports  VTE Prophylaxis: Enoxaparin (Lovenox)    Code Status: Level 1 - Full Code    Counseling/Coordination of Care: Total floor / unit time spent today 30 minutes  Greater than 50% of total time was spent with the patient and / or family counseling and / or coordination of care  A description of the counseling / coordination of care: I reviewed patient's chart, chest radiograph  I discussed case with hospitalist, Dr Kourtney Andrade, and we discussed patient's condition hypoxemia    Also I spoke with patient's caretaker who was at the bedside

## 2019-10-11 NOTE — H&P
History and Physical - [facility] Internal Medicine    Patient Information: Awais Reynoso 80 y o  female MRN: [id number]  Unit/Bed#: [facility] Encounter: [id number]  Admitting Physician: Joanna Pyle MD  PCP: Bernadine Gagnon  Date of Admission:  10/11/19        Hospital Problem List:     Principal Problem:    Aspiration pneumonia ([place] [address] )  Active Problems:    Parkinson's disease ([place] [address] )    Other dysphagia    Elevated blood pressure reading    Gait abnormality    Paroxysmal A-fib ([place] [address] )    Hypothyroidism    Weight loss      Assessment/Plan:    * Aspiration pneumonia ([place] [address] )  Assessment & Plan  Patient with history of parkinsonism with oropharyngeal dysphagia and possible esophageal dysmotility  Patient was scheduled for PEG tube placement today but noted to have increasing cough shortness of breath and hypoxia, procedure was postponed  Chest x-ray with possibility of small bilateral infiltrate and effusion  Admit to Medicine  Check blood culture, routine labs including CBC, CMP, procalcitonin  Urine Legionella pneumococcal antigen, sputum culture if possible  Speech and swallow evaluation, aspiration precaution  Will start on IV ceftriaxone and metronidazole  Bronchodilators, acetylcysteine and chest PT  Pulmonary evaluation      Elevated blood pressure reading  Assessment & Plan  Patient was noted to have elevated blood on presentation  Asymptomatic  Family reports that usually blood pressure on slow  Will observe at present  IV hydralazine as needed for blood pressure more than 180/110    Other dysphagia  Assessment & Plan  Secondary to parkinsonism  History of mild-to-moderate oropharyngeal dysphagia with silent aspiration  Patient and family is now agreeable for PEG tube placement  Continue aspiration precaution  Speech and swallow evaluation  Started puree with honey thick liquid diet for now  Reschedule PEG tube once respiratory status improves    Parkinson's disease ([place] [address] )  Assessment & Plan  Resume home dose of Sinemet 4 times a day  PT/OT    Gait abnormality  Assessment & Plan  PT/OT    Weight loss  Assessment & Plan  Secondary to poor p o  Intake and dysphagia   Nutrition evaluation  Awaiting PEG    Hypothyroidism  Assessment & Plan  TSH  Continue thyroid supplement    Paroxysmal A-fib [facility])  Assessment & Plan  Not a candidate for anticoagulation  Patient was previously on aspirin 81 mg daily  Will verify with family              VTE Prophylaxis: Enoxaparin (Lovenox)  / sequential compression device   Code Status: Level 1 - Full Code    Anticipated Length of Stay:  Patient will be admitted on an Inpatient basis with an anticipated length of stay of  > 2 midnights  Justification for Hospital Stay:  Aspiration pneumonia requiring monitoring of respiratory status and IV antibiotics    Total Time for Visit, including Counseling / Coordination of Care: 45 minutes  Greater than 50% of this total time spent on direct patient counseling and coordination of care  Chief Complaint:     Referred from endoscopy with concerns of aspiration pneumonia    History of Present Illness:    Sixto Urena is a 80 y o  female with history of Parkinson's disease with oropharyngeal dysphagia and possible esophageal dysphagia, hypothyroidism, malnutrition, history of recurrent aspiration pneumonia, dysrhythmia/atrial fibrillation who originally presented for outpatient procedure for elective PEG tube placement due to oropharyngeal and esophageal dysphagia with recurrent aspirations  Patient was noted to be having increasing respiratory secretion and mild hypoxia requiring supplemental oxygen  Procedure was canceled and patient was subsequently admitted with concerns of aspiration pneumonia  Patient and family reported that patient was in usual state of health until today when she did notice increasing cough from her baseline daily morning cough with clear expectoration  She was also noted to be short of breath    The denied any fever chills but reported ongoing poor p o  Intake and weight loss  Patient currently sitting up in bed appears drowsy but arousable  Saturating in mid-90s on 2 L of supplemental oxygen  As per nursing staff intermittent cough noted  Patient denies any chest pain or shortness of breath at present  Review of Systems:    Review of Systems   Constitutional: Positive for activity change and fatigue  Negative for appetite change, chills, diaphoresis, fever and unexpected weight change  HENT: Positive for trouble swallowing  Negative for congestion, rhinorrhea, tinnitus and voice change  Eyes: Negative for visual disturbance  Respiratory: Positive for cough  Negative for chest tightness, shortness of breath, wheezing and stridor  Cardiovascular: Negative for chest pain, palpitations and leg swelling  Gastrointestinal: Negative for abdominal distention, abdominal pain, constipation, diarrhea, nausea and vomiting  Genitourinary: Negative for difficulty urinating, dysuria and flank pain  Musculoskeletal: Positive for arthralgias (Chronic) and gait problem  Negative for back pain and neck stiffness  Skin: Negative for pallor and rash  Neurological: Negative for dizziness, seizures, facial asymmetry, light-headedness, numbness and headaches  Hematological: Negative for adenopathy  Psychiatric/Behavioral: Negative for agitation and confusion         Past Medical and Surgical History:     Past Medical History:   Diagnosis Date    Arthritis     Aspiration into airway     Aspiration pneumonia (HCC)     Hypertension     Hypothyroidism     Parkinson's disease ([facility] [address] )     Thyroid disease        Past Surgical History:   Procedure Laterality Date    [place] CYSTOURETHROSCOPY,URETER CATHETER Left 11/23/2018    Procedure: CYSTOSCOPY RETROGRADE PYELOGRAM WITH INSERTION STENT Juanice Pace DILATION OF STRICTURE;  Surgeon: Keri Landry MD;  Location: [address] [place] [address];  Service: Urology Meds/Allergies:    PTA meds:   Prior to Admission Medications   Prescriptions Last Dose Informant Patient Reported? Taking?    ACETYLCYSTEINE IN 10/10/2019 at 1100  Yes Yes   Sig: Inhale   Ascorbic Acid (VITAMIN C PO) 10/10/2019 at Unknown time Spouse/Significant Other Yes Yes   Sig: Take 1 tablet by mouth daily   Cholecalciferol (VITAMIN D PO) 10/10/2019 at Unknown time Spouse/Significant Other Yes Yes   Sig: Take 1 tablet by mouth daily   Coenzyme Q10 (COQ10 PO) 10/10/2019 at Unknown time Spouse/Significant Other Yes Yes   Sig: Take by mouth   Levodopa (INBRIJA) 42 MG CAPS Not Taking at Unknown time Spouse/Significant Other No No   Sig: Orally inhale contents fo 2 capsules prn off period (Do not exceed 2 doses daily)   Patient not taking: Reported on 10/11/2019   Respiratory Therapy Supplies (NEBULIZER/TUBING/MOUTHPIECE) KIT 10/10/2019 at Unknown time Spouse/Significant Other Yes Yes   Sig: by Does not apply route   azelastine (ASTELIN) 0 1 % nasal spray 10/10/2019 at 2030 Spouse/Significant Other No Yes   Si spray into each nostril 2 (two) times a day Use in each nostril as directed   bisacodyl (DULCOLAX) 10 mg suppository 10/10/2019 at 2000 Spouse/Significant Other No Yes   Sig: Insert 1 suppository (10 mg total) into the rectum daily as needed for constipation   carbidopa-levodopa (SINEMET CR)  mg TBCR per ER tablet 10/11/2019 at 0840 Spouse/Significant Other No Yes   Sig: Take 2 tablets by mouth 4 (four) times a day   carbidopa-levodopa (SINEMET)  mg per tablet 10/11/2019 at 0840 Spouse/Significant Other No Yes   Sig: Take 0 5 tablets by mouth 4 (four) times a day   cyanocobalamin 1000 MCG tablet 10/10/2019 at Unknown time Spouse/Significant Other No Yes   Sig: Take 1 tablet (1,000 mcg total) by mouth daily   dextromethorphan-guaiFENesin (ROBITUSSIN DM)  mg/5 mL syrup 10/10/2019 at Unknown time Spouse/Significant Other No Yes   Sig: Take 10 mL by mouth every 4 (four) hours as needed for cough   docusate sodium (COLACE) 100 mg capsule 10/10/2019 at Unknown time Spouse/Significant Other No Yes   Sig: Take 1 capsule (100 mg total) by mouth 2 (two) times a day   metoprolol tartrate (LOPRESSOR) 25 mg tablet  Spouse/Significant Other No No   Sig: Take 0 5 tablets (12 5 mg total) by mouth every 12 (twelve) hours   Patient not taking: Reported on 6/4/2019   polyvinyl alcohol (LIQUIFILM TEARS) 1 4 % ophthalmic solution Past Week at Unknown time Spouse/Significant Other No Yes   Sig: Administer 1 drop to both eyes every 4 (four) hours as needed for dry eyes   Patient taking differently: Administer 1 drop to both eyes as needed for dry eyes    progesterone (PROMETRIUM) 200 MG capsule Not Taking at Unknown time Spouse/Significant Other Yes No   Sig: Take 200 mg by mouth daily at bedtime     saccharomyces boulardii (FLORASTOR) 250 mg capsule 10/10/2019 at Unknown time Spouse/Significant Other No Yes   Sig: Take 1 capsule (250 mg total) by mouth 2 (two) times a day   thyroid desiccated (ARMOUR) 30 mg tablet 10/11/2019 at 0800 Spouse/Significant Other Yes Yes   Sig: Take 15 mg by mouth daily       Facility-Administered Medications: None       Allergies: No Known Allergies  History:     Marital Status: /Civil Union     Substance Use History:   Social History     Substance and Sexual Activity   Alcohol Use Never    Frequency: Never     Social History     Tobacco Use   Smoking Status Never Smoker   Smokeless Tobacco Never Used     Social History     Substance and Sexual Activity   Drug Use No       Family History:    Family History   Problem Relation Age of Onset    Hypertension Mother     Lung cancer Father        Physical Exam:     Vitals:   Blood Pressure: (!) 190/80 (10/11/19 1349)  Pulse: 65 (10/11/19 1251)  Temperature: 97 7 °F (36 5 °C) (10/11/19 1251)  Temp Source: Tympanic (10/11/19 1038)  Respirations: 18 (10/11/19 1251)  SpO2: 96 % (10/11/19 1251) on 2L supplemental oxygen    Physical Exam Constitutional: She appears well-developed  No distress  Frail, cachectic   HENT:   Head: Normocephalic and atraumatic  Nasal cannula in place   Eyes: Pupils are equal, round, and reactive to light  Conjunctivae are normal    Neck: Normal range of motion  Cardiovascular: Normal rate, regular rhythm and normal heart sounds  Pulmonary/Chest: Effort normal  No respiratory distress  She has decreased breath sounds  She has no wheezes  She has rhonchi (Lower lobes)  She has no rales  Abdominal: Soft  Bowel sounds are normal  She exhibits no distension  There is no tenderness  There is no rebound and no guarding  Musculoskeletal: She exhibits no edema  Neurological: She is alert  No cranial nerve deficit  Skin: Skin is warm and dry  No rash noted  Lab Results: I have personally reviewed pertinent reports  Results from last 7 days   Lab Units 10/11/19  1416 10/11/19  1405   WBC Thousand/uL  --  5 42   HEMOGLOBIN g/dL  --  14 1   HEMATOCRIT %  --  44 4   PLATELETS Thousands/uL 387 422*   NEUTROS PCT %  --  67   LYMPHS PCT %  --  27   MONOS PCT %  --  4   EOS PCT %  --  2           Invalid input(s): LABALBU        Imaging: I have personally reviewed pertinent reports  Xr Chest Portable    Result Date: 10/11/2019  Narrative: CHEST INDICATION:   J95 4: Chemical pneumonitis due to anesthesia  Aspiration pneumonia  COMPARISON:  Chest radiographs October 1, 2019 EXAM PERFORMED/VIEWS:  XR CHEST PORTABLE Images: 1 FINDINGS: The heart is mildly enlarged  Atherosclerotic changes in the aorta  Lung volumes diminished  Small bilateral pleural effusions, right side greater than left  Bilateral lower lobe infiltrate representing atelectasis or pneumonia  Upper lobes clear  Osseous structures appear within normal limits for patient age  Degenerative changes bilateral shoulders  Findings suggest chronic rotator cuff injury on the right       Impression: Small bilateral pleural effusions, right side greater than left, with bilateral lower lobe infiltrates, right side greater than left  Given the history, aspiration pneumonia not excluded  Follow-up recommended  Workstation performed: [id number]     Xr Chest 2 Views    Result Date: 10/1/2019  Narrative: CHEST INDICATION:   Cough and shortness of breath  COMPARISON:  2/14/2019 EXAM PERFORMED/VIEWS:  XR CHEST PA & LATERAL FINDINGS: Heart shadow appears unremarkable  Atherosclerotic vascular calcifications are noted  There are no infiltrates  There is no pneumothorax  The lateral view demonstrates slight blunting of the posterior costophrenic angle suggestive of trace amounts of pleural fluid  There is degenerative arthritis of the right shoulder more than the left  Impression: Findings suggestive of trace amounts of pleural fluid  Clear lungs Workstation performed: [id number]     Us Kidney And Bladder    Result Date: 9/25/2019  Narrative: RENAL ULTRASOUND INDICATION:   N30 20: Other chronic cystitis without hematuria  COMPARISON: 02/21/2019 TECHNIQUE:   Ultrasound of the retroperitoneum was performed with a curvilinear transducer utilizing volumetric sweeps and still imaging techniques  FINDINGS: KIDNEYS: The right kidney is smaller than the left  Right kidney:  8 0 x 3 1 cm  The renal cortex measures 1 4 cm  Left kidney:  9 6 x 5 1 cm  The renal cortex measures 1 6 cm  Right kidney Normal echogenicity and contour  No suspicious masses detected  No hydronephrosis  No shadowing calculi  No perinephric fluid collections  Left kidney Normal echogenicity and contour  No suspicious masses detected  Moderate hydronephrosis without hydroureter  No shadowing calculi  No perinephric fluid collections  URETERS: Nonvisualized  BLADDER: Normally distended  No focal thickening or mass lesions  Bilateral ureteral jets detected  At least 2 large uterine fibroids are present  The endometrium is thickened measuring 1 1 cm   Incidental note is made of the presence of cholelithiasis  Impression: 1  Persistent left hydronephrosis  Possibly on the basis of UPJ obstruction 2  Fibroid uterus  3   Thickened endometrium  Recommend pelvic ultrasound  Neoplasia of the endometrium not excluded  The study was marked in EPIC for significant notification  Workstation performed: [id number]       No orders to display       EKG, Pathology, and Other Studies Reviewed on Admission:   · None    Allscripts/EPIC Records Reviewed: Yes     ** Please Note: "This note has been constructed using a voice recognition system  Therefore there may be syntax, spelling, and/or grammatical errors   Please call if you have any questions  "**

## 2019-10-11 NOTE — PROGRESS NOTES
55-year-old female with a history of Parkinson's disease, recurrent admissions for aspiration pneumonia, we had seen her on Monday, patient has significant oropharyngeal dysphagia and was scheduled for outpatient endoscopy with PEG placement today  Upon arrival patient was found to have rhonchorous breath sounds, requiring oxygen to maintain saturation at 93%  Patient was not stable for endoscopic procedure and elective PEG placement at this time  Discussed with the patient and her family, will try to have her admitted to the hospital for evaluation and treatment for pneumonia and once stabilized from respiratory standpoint will plan to proceed with upper endoscopy and PEG placement

## 2019-10-11 NOTE — PERIOPERATIVE NURSING NOTE
Patient transported to [facility] with family at bedside  Procedure not performed due to respiratory status   Reported off to [facility], [address] [facility]

## 2019-10-11 NOTE — H&P
History and Physical -  [facility]  Jose Armendariz 80 y o  female MRN: [id number]    HPI: Jose Armendariz is a 80y o  year old female who presents with dysphagia, parkinsons disease  Review of Systems    Historical Information   Past Medical History:   Diagnosis Date    Arthritis     Aspiration into airway     [facility] pneumonia [facility] [address] )     Hypertension     Hypothyroidism     Parkinson's disease ([facility] [address] )     Thyroid disease      Past Surgical History:   Procedure Laterality Date    DE CYSTOURETHROSCOPY,URETER CATHETER Left 11/23/2018    Procedure: CYSTOSCOPY RETROGRADE PYELOGRAM WITH INSERTION STENT Liz Lujan OF STRICTURE;  Surgeon: Behzad Bejarano MD;  Location: [facility];  Service: Urology     Social History   Social History     Substance and Sexual Activity   Alcohol Use Never    Frequency: Never     Social History     Substance and Sexual Activity   Drug Use No     Social History     Tobacco Use   Smoking Status Never Smoker   Smokeless Tobacco Never Used     Family History   Problem Relation Age of Onset    Hypertension Mother     Lung cancer Father        Meds/Allergies       (Not in a hospital admission)    No Known Allergies    Objective     There were no vitals taken for this visit  PHYSICAL EXAM    Gen: NAD  CV: RRR  CHEST: Clear  ABD: soft, NT/ND  EXT: no edema  Neuro: AAO      ASSESSMENT/PLAN:  This is a 80y o  year old female here for dysphagia, parkinsons disease         PLAN:   Procedure: egd w possible PEG

## 2019-10-12 PROBLEM — E87.6 HYPOKALEMIA: Status: ACTIVE | Noted: 2019-01-01

## 2019-10-12 NOTE — ASSESSMENT & PLAN NOTE
· Patient previously on on 2 tabs Sinemet CR and half tablet Sinemet 4 times a day  · Contacted patient's neurologist office, recommended to transition to Sinemet 2 tablets regular release 4 times a day via PEG tube    Discontinued Sinemet CR  · Follow up with Neurology as regarding approval for long-acting capsule form  · Follow up with Neurology after discharge  · PT/OT rec STR

## 2019-10-12 NOTE — ASSESSMENT & PLAN NOTE
Patient was noted to have elevated blood on presentation  Asymptomatic  Family reports that usually blood pressure on low, does not want addition of antihypertensive medication  Will observe at present

## 2019-10-12 NOTE — OCCUPATIONAL THERAPY NOTE
OT EVALUATION     10/12/19 1005   Note Type   Note type Eval/Treat   Restrictions/Precautions   Other Precautions Chair Alarm; Bed Alarm;Aspiration; Fall Risk;Pain   Pain Assessment   Pain Assessment Hitchcock-Baker FACES   Hitchcock-Baker FACES Pain Rating 6   Pain Type Chronic pain   Pain Location Shoulder   Pain Orientation Right   Home Living   Type of Home House   Home Layout One level; Able to live on main level with bedroom/bathroom; Performs ADLs on one level  (2 sets of stairs to enter with stairglides)   Bathroom Shower/Tub Walk-in shower   Bathroom Toilet Standard  (with side rails)   [employer] Grab bars in shower; Shower chair;Commode   Home Equipment Walker;Stair glide; Wheelchair-manual;Grab bars  (adjustable bed, rollator, bed rail)   Additional Comments pt history provided by spouse and caregiver   Prior Function   Level of [place] Needs assistance with IADLs; Needs assistance with ADLs and functional mobility   Lives With Spouse; Other (Comment)  (24 hour caregiver)   Receives Help From Family;Home health;Neighbor   ADL Assistance Needs assistance   IADLs Needs assistance   Falls in the last 6 months 0   Comments caregiver indicates an increasing need for assist with care recently   Subjective   Subjective "that's enough therapy for today "   ADL   Where Assessed Edge of bed   Eating Assistance 3  Moderate Assistance   Grooming Assistance 2  Maximal Assistance   UB Bathing Assistance 2  Maximal Assistance   LB Bathing Assistance 1  Total Assistance   [address] 2  Maximal Assistance   LB Dressing Assistance 1  Total Assistance   Toileting Assistance  1  Total Assistance   Bed Mobility   Rolling R 2  Maximal assistance   Rolling L 2  Maximal assistance   Supine to Sit 2  Maximal assistance   Additional items Assist x 2   Sit to Supine 2  Maximal assistance   Additional items Assist x 2   Transfers   Sit to Stand 2  Maximal assistance   Additional items Assist x 2   Stand to Sit 2  Maximal assistance   Additional items Assist x 2   Stand pivot 2  Maximal assistance   Additional items Assist x 2   Toilet transfer 2  Maximal assistance   Additional items Assist x 2;Commode   Functional Mobility   Functional Mobility   (continue to assess)   Balance   Static Sitting Fair -   Dynamic Sitting Poor +   Static Standing Poor   Dynamic Standing Poor -   Activity Tolerance   Activity Tolerance Patient limited by fatigue   RUE Assessment   RUE Assessment   (ROM: PROM grossly WFL, painful sh  MMT: 3/5)   LUE Assessment   LUE Assessment   (ROM: grossly WFL MMT: 3+/5)   Cognition   Overall Cognitive Status Impaired   Arousal/Participation Arousable   Attention Attends with cues to redirect   Orientation Level Oriented to person   Following Commands Follows one step commands with increased time or repetition   Comments pt with minimal verbalizations, eyes closed, responding to verbal and tactile cues   Assessment   Limitation Decreased ADL status; Decreased UE ROM; Decreased UE strength;Decreased cognition;Decreased endurance;Decreased self-care trans;Decreased high-level ADLs   Prognosis Good   Assessment Patient evaluated by Occupational Therapy  Patient admitted with Aspiration pneumonia ([facility] [address] )  The patients occupational profile, medical and therapy history includes a expanded review of medical and/or therapy records and additional review of physical, cognitive, or psychosocial history related to current functional performance  Comorbidities affecting functional mobility and ADLS include: afib, arthritis, chronic pain, hypertension, Parkinsons and dysphagia  Prior to admission, patient was requiring assist for functional mobility with rolling walker/rollator/wheel chair, requiring assist for ADLS, requiring assist for IADLS, living with spouse and caregiver in a single level home with stairglide adapted steps to enter and having 24 hour care     The evaluation identifies the following performance deficits: weakness, decreased ROM, impaired balance, decreased endurance, decreased coordination, increased fall risk, new onset of impairment of functional mobility, decreased ADLS, decreased IADLS, pain, decreased activity tolerance, decreased safety awareness and decreased strength, that result in activity limitations and/or participation restrictions  This evaluation requires clinical decision making of high complexity, because the patient presents with comorbidites that affect occupational performance and required significant modification of tasks or assistance with consideration of multiple treatment options  The Barthel Index was used as a functional outcome tool presenting with a score of 20, indicating marked limitations of functional mobility and ADLS  Patient will benefit from skilled Occupational Therapy services to address above deficits and facilitate a safe return to prior level of function  Goals   Patient Goals no goals stated by pt; spouse expressing desire to take pt home   STG Time Frame   (1-7 days)   Short Term Goal #1 Goals established to promote patient goal of "go home" as stated by pt's spouse at bedside:  Patient will increase standing tolerance to 3 minutes during ADL task to decrease assistance level and decrease fall risk; Patient will increase bed mobility to mod assist of 2 in preparation for ADLS and transfers;  Patient will increase functional mobility to bedside chair with rolling walker with mod assist of 2 to increase performance with ADLS and to use a toilet; Patient will tolerate 5 minutes of UE ROM/strengthening to increase general activity tolerance and performance in ADLS/IADLS; Patient will improve functional activity tolerance to 8 minutes of sustained functional tasks to increase participation in basic self-care and decrease assistance level;    LTG Time Frame   (8-14 days)   Long Term Goal #1 Patient will increase standing tolerance to 5 minutes during ADL task to decrease assistance level and decrease fall risk; Patient will increase bed mobility to min assist of 2 in preparation for ADLS and transfers; Patient will increase functional mobility to bedside chair with rolling walker with mod assist to increase performance with ADLS and to use a toilet; Patient will tolerate 8 minutes of UE ROM/strengthening to increase general activity tolerance and performance in ADLS/IADLS; Patient will improve functional activity tolerance to 12 minutes of sustained functional tasks to increase participation in basic self-care and decrease assistance level; Functional Transfer Goals   Pt Will Perform All Functional Transfers   (STG: MAX assist x 1  LTG: MOD assist x 1)   Pt Will Transfer To Toilet   (STG: MAX assist x 1 LTG: MOD assist x 1)   ADL Goals   Pt Will Perform Grooming   (STG: MOD assist LTG: MIN assist)   Pt Will Perform UE Dressing   (STG: MOD assist LTG: MIN assist)   Pt Will Perform LE Dressing   (STG: MAX assist LTG: MOD assist)   Plan   Treatment Interventions ADL retraining;Functional transfer training;UE strengthening/ROM; Endurance training;Patient/family training;Equipment evaluation/education; Compensatory technique education; Energy conservation; Activityengagement   Goal Expiration Date 10/26/19   OT Frequency 3-5x/wk   Additional Treatment Session   Start Time 7326   End Time 1005   Treatment Assessment Pt seen at bedside  Pt assisted with transfer to commode for toileting with MAX assist x2 and dependent perineal care  Pt requires assist of 2 people to perform safe transfers  Pt requires assist with hygiene  Pt with limited endurance and  poor balance  Pt will continue to benefit from skilled intervention  Will follow  Recommend D/C to STR    Thank you for this referral    Recommendation   OT Discharge Recommendation Short Term Rehab   Barthel Index   Feeding 0   Bathing 0   Grooming Score 0   Dressing Score 0   Bladder Score 5   Bowels Score 5   Toilet Use Score 5 Transfers (Bed/Chair) Score 5   Mobility (Level Surface) Score 0   Stairs Score 0   Barthel Index Score 20   Licensure   [place] License Number  [id number] [address] Lic# [id number]

## 2019-10-12 NOTE — SOCIAL WORK
Pt is current LOS 1  Not a readmission or a MB at this time  Met with pt, pt's  Chiquita Nova, and private caregiver  Pt was to have peg tube placed, but was found to probably have aspiration pneumonia  Peg tube cancelled  Per , may get it on Monday   is POA  No LW  Home is one floor, has two acorn chair glides to get in to the home  Has RW, w/c, rollator, and shower chair  Is currently open to [facility] but explained they would have to discharge from their services if nursing is needed for tube feeding  Pt was open to [facility] in the past, and would choose to use them again  Will make initial referral   Will also start referral to [facility] to process tube feeding most likely for this week's discharge to home   concerned about having discharge needs in place  Pt had been to [facility] in the past   Uses [email or web address] pharmacy in [place]  Explained role of cm, will need to follow  Plan to have nursing start teaching of peg and process prior to pt's d/c  CM reviewed d/c planning process including the following: identifying help at home, patient preference for d/c planning needs, and availability of the treatment team to discuss questions or concerns patient and/or family may have regarding understanding of medications and recognizing signs and symptoms once discharged  CM also encouraged patient to follow up with all recommended appointments after discharge  Patient advised of importance for patient and family to participate in managing patient's medical well being

## 2019-10-12 NOTE — ASSESSMENT & PLAN NOTE
· Patient with history of parkinsonism with oropharyngeal dysphagia and possible esophageal dysmotility  · Patient was scheduled for PEG tube placement on the day of admission but noted to have increasing cough shortness of breath and hypoxia, procedure was postponed  · Chest x-ray with possibility of small bilateral infiltrate and effusion  · Remains afebrile  Normal white count  Oxygen requirement is improving   · Blood cultures negative so far  · Urine Legionella pneumococcal antigen negative  · Follow-up Speech and swallow evaluation-recommended VBS for safe PO diet for pleasure  · Strict aspiration precautions  · Repeat chest x-ray showed improvement  · Procalcitonin normalized with treatment  · Received 4 days of Ceftriaxone/flagyl  · Bronchodilators, acetylcysteine and chest PT  · Pulmonary following, input appreciated    Recommended scopolamine patch

## 2019-10-12 NOTE — PHYSICAL THERAPY NOTE
PT EVALUATION     10/12/19 1030   Pain Assessment   Pain Assessment Hitchcock-Baker FACES   Hitchcock-Baker FACES Pain Rating 6  (R shoulder area with ROM, movement)   Home Living   Type of [facility] One level  (stairs to enter and 5 to living area both with stair glide)   Home Equipment Walker;Stair glide  (with roller and rollator, using rollator as WC in home now)   Additional Comments patient poor historian, information obtained from family and 24 hour caregiver  patient recently requiring assist of 1 to 2 for stand pivot transfers to [address] and toilet, not able to ambulate in home over past several weeks due to weakness   Prior Function   Level of [place] Needs assistance with ADLs and functional mobility   Lives With Spouse; Other (Comment)  (24 hour caregiver)   Receives Help From Family;Home health   ADL Assistance Needs assistance   IADLs Needs assistance   Comments patient dependent with ADLs and IADLs prior to admission and recent decline in functional mobility with little to no ambulation and completing stand pivot transfers only   Restrictions/Precautions   Other Precautions Chair Alarm; Bed Alarm;Cognitive; Fall Risk   General   Additional Pertinent History chart reviewed,patient admitted with aspiration pneumonia  Was to have PEG tube placed but held due to ?aspiration pneumonia   Patient presents as [place] Parents and deconditioned    Family/Caregiver Present Yes   Cognition   Overall Cognitive Status Impaired   Arousal/Participation Cooperative   Orientation Level Oriented to person   Following Commands Follows one step commands with increased time or repetition   Comments patient with eyes closed at times and limited verbalization   RLE Assessment   RLE Assessment   (ROM WFL, strength 3+/3)   LLE Assessment   LLE Assessment   (ROM SFL, strength 3+/3)   Coordination   Movements are Fluid and Coordinated 0   Bed Mobility   Supine to Sit 2  Maximal assistance   Additional items Verbal cues;LE management Sit to Supine 2  Maximal assistance   Additional items Verbal cues;LE management   Transfers   Sit to Stand 2  Maximal assistance   Additional items Assist x 2;Verbal cues   Stand to Sit 2  Maximal assistance   Additional items Assist x 2;Verbal cues   Ambulation/Elevation   Gait Assistance 2  Maximal assist   Additional items Assist x 2;Verbal cues; Tactile cues   Assistive Device Rolling walker   Distance 3-5 steps from bed to commode with max assist for weight shifting to be able to advance LEs, forward flexed posturing and head downward   Balance   Static Sitting Fair -   Dynamic Sitting Poor +   Static Standing Poor   Dynamic Standing Poor -   Ambulatory Poor -   Activity Tolerance   Activity Tolerance Patient limited by fatigue  (limited by weakness)   Nurse Made Aware yes   Assessment   Prognosis Good   Problem List Decreased strength;Decreased range of motion;Decreased endurance; Impaired balance;Decreased mobility; Decreased coordination;Decreased cognition; Impaired judgement;Decreased safety awareness   Assessment Patient seen for Physical Therapy evaluation  Patient admitted with aspiration pneumonia  Comorbidities affecting patient's physical performance include: gait dysfunction, thyroid disease, Parkinson's disease who presents with weakness, falls, arthritis, afib  Personal factors affecting patient at time of initial evaluation include: ambulating with assistive device, inability to ambulate household distances, inability to navigate community distances, inability to navigate level surfaces without external assistance, inability to perform dynamic tasks in community, positive fall history, inability to perform caregiver support/tasks, inability to perform physical activity, inability to perform ADLS and inability to perform IADLS    Prior to admission, patient was independent with functional mobility with walker, independent with ADLS, requiring assist for IADLS, living in a multi-level home, ambulating household distance, having 24 hour care  and home with family assist   Please find objective findings from Physical Therapy assessment regarding body systems outlined above with impairments and limitations including weakness, decreased ROM, impaired balance, decreased endurance, impaired coordination, gait deviations, decreased activity tolerance, decreased functional mobility tolerance, decreased safety awareness and fall risk  The Barthel Index was used as a functional outcome tool presenting with a score of 20 today indicating marked limitations of functional mobility and ADLS  Patient's clinical presentation is currently unstable/unpredictable as seen in patient's presentation of vital sign response, changing level of pain, increased fall risk, new onset of impairment of functional mobility, decreased endurance and new onset of weakness  Pt would benefit from continued Physical Therapy treatment to address deficits as defined above and maximize level of functional mobility  As demonstrated by objective findings, the assigned level of complexity for this evaluation is high  Goals   Patient Goals none stated, patient confused   STG Expiration Date 10/19/19   Short Term Goal #1 transfers and gait with roller walker with mod assist of 2   Short Term Goal #2 gait endurance to 20 feet, strength BLEs 3+/4-    LTG Expiration Date 10/26/19   Long Term Goal #1 transfers and gait with roller walker with min assist   Long Term Goal #2 gait endurance to 30 feet, strength BLEs 4-/5   Plan   Treatment/Interventions ADL retraining;Functional transfer training;LE strengthening/ROM; Therapeutic exercise; Endurance training;Cognitive reorientation;Patient/family training;Equipment eval/education; Bed mobility;Gait training; Compensatory technique education   PT Frequency 5x/wk   Recommendation   Recommendation Short-term skilled PT   Barthel Index   Feeding 0   Bathing 0   Grooming Score 0   Dressing Score 0   Bladder Score 5   Bowels Score 5   Toilet Use Score 5   Transfers (Bed/Chair) Score 5   Mobility (Level Surface) Score 0   Stairs Score 0   Barthel Index Score 20   Licensure   [place] License Number  Lila Heart PT [id number]       PT TREATMENT  Time In:1015  Time Out:1030  Total Time: 15min      S:  Patient confused and without pain noted in BLEs   O:  -sit to and from stand with mod to max assist of 2   -gait with hand hold assist of 2, max assist, endurance 5 feet from commode to chair for OOB (nursing aware)  -gait patterning with toe walking and max assist for weight shifting to advance LEs  A:  Patient will benefit from continued PT with progression as tolerated     P:  STR    Sabrina Rich, PT

## 2019-10-12 NOTE — ASSESSMENT & PLAN NOTE
Not a candidate for anticoagulation  Patient was previously on aspirin 81 mg daily  Resume at discharge, via PEG

## 2019-10-12 NOTE — PROGRESS NOTES
[facility] Internal Medicine Progress Note  Patient: Meri Rubin 80 y o  female   MRN: [id number]  PCP: Susan Jernigan  Unit/Bed#: [address] [address] A Encounter: [id number]  Date Of Visit: 10/12/19    Problem List:    Principal Problem:    Aspiration pneumonia ([address] )  Active Problems:    Parkinson's disease ([facility] [address] )    Other dysphagia    Elevated blood pressure reading    Gait abnormality    Paroxysmal A-fib ([facility] [address] )    Hypothyroidism    Weight loss    Hypokalemia      Assessment & Plan:    * Aspiration pneumonia ([facility] [address] )  Assessment & Plan  Patient with history of parkinsonism with oropharyngeal dysphagia and possible esophageal dysmotility  Patient was scheduled for PEG tube placement on the day of admission but noted to have increasing cough shortness of breath and hypoxia, procedure was postponed  Chest x-ray with possibility of small bilateral infiltrate and effusion  Remains afebrile  Normal white count    Oxygen requirement is improving  Blood cultures pending  Urine Legionella pneumococcal antigen negative, sputum culture if possible  Follow-up Speech and swallow evaluation, aspiration precaution  Continue IV ceftriaxone and metronidazole for now  Bronchodilators, acetylcysteine and chest PT  Pulmonary evaluation appreciated      Elevated blood pressure reading  Assessment & Plan  Patient was noted to have elevated blood on presentation  Asymptomatic  Family reports that usually blood pressure on low  Will observe at present  IV hydralazine as needed for blood pressure more than 180/110    Other dysphagia  Assessment & Plan  Secondary to parkinsonism  History of mild-to-moderate oropharyngeal dysphagia with silent aspiration  Patient and family is now agreeable for PEG tube placement  Continue aspiration precaution  Speech and swallow evaluation  Started puree with honey thick liquid diet for now  Will minimize pills as patient seems to have difficulty in swallowing  Reschedule PEG tube once respiratory status improves  GI follow-up    Parkinson's disease ([facility] [address] )  Assessment & Plan  Resume home dose of Sinemet 4 times a day  PT/OT    Gait abnormality  Assessment & Plan  PT/OT    Hypokalemia  Assessment & Plan  Replete IV    Weight loss  Assessment & Plan  Secondary to poor p o  Intake and dysphagia   Nutrition evaluation  Awaiting PEG    Hypothyroidism  Assessment & Plan  TSH within normal limit  Continue thyroid supplement    Paroxysmal A-fib [facility])  Assessment & Plan  Not a candidate for anticoagulation  Patient was previously on aspirin 81 mg daily  Will verify with family            VTE Pharmacologic Prophylaxis:   Pharmacologic: Enoxaparin (Lovenox)  Mechanical VTE Prophylaxis in Place: Yes    Patient Centered Rounds: I have performed bedside rounds with nursing staff today  Discussions with Specialists or Other Care Team Provider:  Yes    Education and Discussions with Family / Patient:  Patient & Family    Time Spent for Care: 45 minutes  More than 50% of total time spent on counseling and coordination of care as described above  Current Length of Stay: 1 day(s)    Current Patient Status: Inpatient   Certification Statement: The patient will continue to require additional inpatient hospital stay due to Aspiration pneumonia, persistent secretion    Discharge Plan:  Home with 24 hour care versus rehab    Code Status: Level 1 - Full Code      Subjective:   Reported increasing secretion overnight and cough  Remains afebrile  Denies chest pain or shortness of breath  Difficulty swallowing pills    Objective:     Vitals:   Temp (24hrs), Av 7 °F (36 5 °C), Min:97 6 °F (36 4 °C), Max:97 9 °F (36 6 °C)    Temp:  [97 6 °F (36 4 °C)-97 9 °F (36 6 °C)] 97 6 °F (36 4 °C)  HR:  [65-78] 76  Resp:  [16-20] 19  BP: (116-213)/(53-88) 184/73  SpO2:  [93 %-100 %] 95 % on supplemental oxygen  Body mass index is 21 16 kg/m²  Input and Output Summary (last 24 hours):        Intake/Output Summary (Last 24 hours) at 10/12/2019 MANDO Vasquez 67 filed at 10/11/2019 1800  Gross per 24 hour   Intake 900 ml   Output 100 ml   Net 800 ml       Physical Exam:     Physical Exam   Constitutional: She is oriented to person, place, and time  She appears well-developed  She appears cachectic  No distress  Chronically ill, frail   HENT:   Head: Normocephalic and atraumatic  Nose: Nose normal    Eyes: Pupils are equal, round, and reactive to light  Conjunctivae are normal    Neck: Normal range of motion  Neck supple  Cardiovascular: Normal rate, regular rhythm and normal heart sounds  Pulmonary/Chest: Effort normal  No respiratory distress  She has no wheezes  She has rhonchi (At bases)  She has no rales  Diminished   Abdominal: Soft  Bowel sounds are normal  She exhibits no distension  There is no tenderness  There is no rebound and no guarding  Musculoskeletal: She exhibits no edema  Neurological: She is alert and oriented to person, place, and time  No cranial nerve deficit  At baseline   Skin: Skin is warm and dry  No rash noted  Psychiatric: She has a normal mood and affect  Additional Data:     Labs:    Results from last 7 days   Lab Units 10/12/19  0548   WBC Thousand/uL 5 40   HEMOGLOBIN g/dL 12 6   HEMATOCRIT % 39 3   PLATELETS Thousands/uL 348   NEUTROS PCT % 71   LYMPHS PCT % 21   MONOS PCT % 7   EOS PCT % 1     Results from last 7 days   Lab Units 10/12/19  0548 10/11/19  1405   POTASSIUM mmol/L 3 4* 4 0   CHLORIDE mmol/L 105 104   CO2 mmol/L 28 31   BUN mg/dL 12 14   CREATININE mg/dL 0 70 0 81   CALCIUM mg/dL 8 6 9 5   ALK PHOS U/L  --  101   ALT U/L  --  <6*   AST U/L  --  34     Results from last 7 days   Lab Units 10/11/19  1405   INR  1 04       * I Have Reviewed All Lab Data Listed Above  * Additional Pertinent Lab Tests Reviewed:  All Labs Within Last 24 Hours Reviewed      Imaging:  Imaging Reports Reviewed Today Include:  Chest x-ray    Recent Cultures (last 7 days):     Results from last 7 days   Lab Units 10/11/19  1814   LEGIONELLA URINARY ANTIGEN  Negative       Last 24 Hours Medication List:     Current Facility-Administered Medications:  acetaminophen 650 mg Oral Q6H PRN Beckie Santoyo MD    acetylcysteine 3 mL Nebulization Q6H Beckie Santoyo MD    azelastine 1 spray Each Nare BID Beckie Santoyo MD    bisacodyl 10 mg Rectal Daily PRN Beckie Santoyo MD    carbidopa-levodopa 2 tablet Oral 4x Daily Mayco Fothergill, CRNP    carbidopa-levodopa 0 5 tablet Oral 4x Daily Mayco Fothergill, CRNP    cefTRIAXone 1,000 mg Intravenous Q24H Beckie Santoyo MD Last Rate: 1,000 mg (10/11/19 1545)   dextran 70-hypromellose 1 drop Both Eyes Q3H PRN Beckie Santoyo MD    dextromethorphan-guaiFENesin 5 mL Oral Q4H PRN Beckie Santoyo MD    enoxaparin 40 mg Subcutaneous Daily Beckie Santoyo MD    hydrALAZINE 5 mg Intravenous Q6H PRN Beckie Santoyo MD    ipratropium-albuterol 3 mL Nebulization Q6H Beckie Santoyo MD    lactobacillus acidophilus-bulgaricus 1 packet Oral TID With Meals Beckie Santoyo MD    metroNIDAZOLE 500 mg Intravenous Q8H Beckie Santoyo MD Last Rate: 500 mg (10/12/19 0556)   multi-electrolyte 50 mL/hr Intravenous Continuous Beckie Santoyo MD Last Rate: 50 mL/hr (10/11/19 1653)   multivitamin with iron-minerals 15 mL Oral Daily Beckie Santoyo MD    ondansetron 4 mg Intravenous Q6H PRN Beckie Santoyo MD    thyroid desiccated 15 mg Oral Daily Beckie Santoyo MD           Today, Patient Was Seen By: Beckie Santoyo MD    ** Please Note: "This note has been constructed using a voice recognition system  Therefore there may be syntax, spelling, and/or grammatical errors   Please call if you have any questions  "**

## 2019-10-12 NOTE — ASSESSMENT & PLAN NOTE
· Secondary to parkinsonism  · History of mild-to-moderate oropharyngeal dysphagia with silent aspiration  · Status post PEG tube placement on 10/15  · Transitionned patient's meds to PEG tube only  · Nutrition is evaluation for tube feed recs  · Continue aspiration precaution  · Speech and swallow evaluation appreciated, patient ok to have pleasure feeds of HTL hand feb via teaspoon only  · Discharged on Jevity 1 2, 1 can (8 oz, 240 mL) 4 times per day, free water flushes of 60 mL before and after each tube feeding bolus

## 2019-10-12 NOTE — ASSESSMENT & PLAN NOTE
PT/OT rec STR, patient initially declined and wanted to take her home with [facility] however they recognized that she needs higher level of care on day of discharge and accepted STR

## 2019-10-12 NOTE — PLAN OF CARE
Problem: Nutrition/Hydration-ADULT  Goal: Nutrient/Hydration intake appropriate for improving, restoring or maintaining nutritional needs  Description  Monitor and assess patient's nutrition/hydration status for malnutrition  Collaborate with interdisciplinary team and initiate plan and interventions as ordered  Monitor patient's weight and dietary intake as ordered or per policy  Utilize nutrition screening tool and intervene as necessary  Determine patient's food preferences and provide high-protein, high-caloric foods as appropriate       INTERVENTIONS:  - Assess nutrition and hydration status and recommend course of action  - Assist patient with eating if necessary   - Allow adequate time for meals  - Recommend/ encourage appropriate diets, oral nutritional supplements, and vitamin/mineral supplements  - Order, calculate, and assess calorie counts as needed  - Provide specific nutrition/hydration education as appropriate  - Include patient/family/caregiver in decisions related to nutrition   Outcome: Progressing     Problem: RESPIRATORY - ADULT  Goal: Achieves optimal ventilation and oxygenation  Description  INTERVENTIONS:  - Assess for changes in respiratory status  - Assess for changes in mentation and behavior  - Position to facilitate oxygenation and minimize respiratory effort  - Oxygen administered by appropriate delivery if ordered  - Assess the need for suctioning and aspirate as needed  - Respiratory Therapy support as indicated  - Nebulizer    Outcome: Progressing     Problem: Prexisting or High Potential for Compromised Skin Integrity  Goal: Skin integrity is maintained or improved  Description  INTERVENTIONS:  - Assess and monitor skin integrity  - Assess and monitor nutrition and hydration status  - Monitor labs   - Assess for incontinence   - Turn and reposition patient  - Assist with mobility/ambulation  - Relieve pressure over bony prominences  - Avoid friction and shearing  - Evaluate need for skin moisturizer/barrier cream     Outcome: Progressing     Problem: Potential for Falls  Goal: Patient will remain free of falls  Description  INTERVENTIONS:  - Assess patient frequently for physical needs  -  Identify cognitive and physical deficits and behaviors that affect risk of falls    -  [place] fall precautions as indicated by assessment   - Educate patient/family on patient safety including physical limitations  - Instruct patient to call for assistance with activity based on assessment  - Modify environment to reduce risk of injury  - Consider OT/PT consult to assist with strengthening/mobility  Outcome: Progressing

## 2019-10-13 PROBLEM — E87.6 HYPOKALEMIA: Status: RESOLVED | Noted: 2019-01-01 | Resolved: 2019-01-01

## 2019-10-13 NOTE — UTILIZATION REVIEW
Initial Clinical Review    Admission: Date/Time/Statement: Inpatient Admission Orders (From admission, onward)     Ordered        10/11/19 1540  Inpatient Admission  Once                   Orders Placed This Encounter   Procedures    Inpatient Admission     Standing Status:   Standing     Number of Occurrences:   1     Order Specific Question:   Admitting Physician     Answer:   Puja Buchanan [[id number]]     Order Specific Question:   Level of Care     Answer:   Med Surg [[id number]]     Order Specific Question:   Estimated length of stay     Answer:   More than 2 Midnights     Order Specific Question:   Certification     Answer:   I certify that inpatient services are medically necessary for this patient for a duration of greater than two midnights  See H&P and MD Progress Notes for additional information about the patient's course of treatment  Assessment/Plan:     80year old female received from  OR where she was to have  An EGD for evaluation and treatment of aspiration pneumonia  PMHX  Of Parkinson's with oropharyngeal dysphagia, dysrhythmia and atrial fibrillation  On arrival for EGD patient had increasing secretions  And mild hypoxemia  EGD  Cancelled and patient admitted  Imaging  shows bilateral pleural effusions and infiltrates  Admit to inpatient medical surgical for aspiration pneumonia  Plan to support with supplemental oxygen, treat with iv antibiotics and bronchodilators, obtain speech swallow evaluation, and sputum culture  Obtain PT OT evaluations         Arrival    10/11/19 1038 10/11/19 1038 10/11/19 1038 10/11/19 1038 10/11/19 1038   (!) 97 °F (36 1 °C) 60 (!) 24 (!) 191/74 (!) 89 %      Tympanic          No Pain        Wt Readings from Last 1 Encounters:   10/11/19 50 8 kg (112 lb)     Additional Vital Signs:    10/12/19 23:35:15  98 °F (36 7 °C)  73  17  132/62  85  96 %  --    10/12/19 2100  --  74  --  --  --  97 %  Nasal cannula    10/12/19 15:03:25  97 8 °F (36 6 °C)  79  18  137/63 88  94 %  --    10/12/19 1421  --  --  --  --  --  96 %  --    10/12/19 0856  97 6 °F (36 4 °C)  76  19  184/73Abnormal   --  95 %  Nasal cannula    10/12/19 0724  --  78  18  --  --  98 %  Nasal cannula    10/12/19 03:30:02  97 9 °F (36 6 °C)  78  16  145/71  96  98 %  --    10/12/19 0136  --  --  --  --  --  93 %  --    10/12/19 00:33:20  --  72  20  174/75Abnormal   108  94 %  --    10/11/19 2025  --  --  --  --  --  100 %  Nasal cannula    10/11/19 16:19:57  97 7 °F (36 5 °C)  67  18  116/53  74  97 %  --    10/11/19 1516  --  --  --  --  --  100 %  Nasal cannula    10/11/19 1512  --  --  --  160/80  --  --  --    10/11/19 1349  --  --  --  190/80Abnormal   --  --  --    10/11/19 12:51:06  97 7 °F (36 5 °C)  65  18  213/88Abnormal   130  96 %  --    10/11/19 1215  --  65  20  193/79Abnormal   --  93 %  Nasal cannula    10/11/19 1043  --  --  --  --  --  91 %  Nasal cannula    10/11/19 1038  97 °F (36 1 °C)Abnormal   60  24Abnormal   191/74Abnormal   --  89 %Abnormal   None (Room air)       10/11 1215 10/11 1516 10/11 2025 10/12 0724 10/12 0856 10/12 2100   O2 Device Nasal c  Nasal c  Nasal c  Nasal c  Nasal c  Nasal c     O2 Therapy  Oxygen       O2 Flow Rate (L/min) (L/min) 2 2 2 2 2 2         Pertinent Labs/Diagnostic Test Results:     chest x ray    Small bilateral pleural effusions, right side greater than left, with bilateral lower lobe infiltrates, right side greater than left   Given the history, aspiration pneumonia not excluded   Follow-up recommended    Results from last 7 days   Lab Units 10/12/19  0548 10/11/19  1416 10/11/19  1405   WBC Thousand/uL 5 40  --  5 42   HEMOGLOBIN g/dL 12 6  --  14 1   HEMATOCRIT % 39 3  --  44 4   PLATELETS Thousands/uL 348 387 422*   NEUTROS ABS Thousands/µL 3 82  --  3 59         Results from last 7 days   Lab Units 10/13/19  0528 10/12/19  0548 10/11/19  1405   SODIUM mmol/L 141 140 140   POTASSIUM mmol/L 4 2 3 4* 4 0   CHLORIDE mmol/L 105 105 104   CO2 mmol/L 28 28 31   ANION GAP mmol/L 8 7 5   BUN mg/dL 11 12 14   CREATININE mg/dL 0 66 0 70 0 81   EGFR ml/min/1 73sq m 80 79 66   CALCIUM mg/dL 9 0 8 6 9 5   MAGNESIUM mg/dL  --   --  2 4   PHOSPHORUS mg/dL  --   --  3 2     Results from last 7 days   Lab Units 10/11/19  1405   AST U/L 34   ALT U/L <6*   ALK PHOS U/L 101   TOTAL PROTEIN g/dL 7 6   ALBUMIN g/dL 3 1*   TOTAL BILIRUBIN mg/dL 0 40         Results from last 7 days   Lab Units 10/13/19  0528 10/12/19  0548 10/11/19  1405   GLUCOSE RANDOM mg/dL 94 91 90       Results from last 7 days   Lab Units 10/11/19  1405   PROTIME seconds 11 1   INR  1 04   PTT seconds 29         Results from last 7 days   Lab Units 10/11/19  1405   PROCALCITONIN ng/ml 0 06     Results from last 7 days   Lab Units 10/11/19  1405   LACTIC ACID mmol/L 0 7       Results from last 7 days   Lab Units 10/11/19  1814   CLARITY UA  Clear   COLOR UA  Yellow   SPEC GRAV UA  1 025   PH UA  6 0   GLUCOSE UA mg/dl Negative   KETONES UA mg/dl 15 (1+)*   BLOOD UA  Negative   PROTEIN UA mg/dl Trace*   NITRITE UA  Negative   BILIRUBIN UA  Negative   UROBILINOGEN UA E U /dl 0 2   LEUKOCYTES UA  Negative   WBC UA /hpf 10-20*   RBC UA /hpf None Seen   BACTERIA UA /hpf Innumerable*   EPITHELIAL CELLS WET PREP /hpf Occasional     Results from last 7 days   Lab Units 10/11/19  1814   STREP PNEUMONIAE ANTIGEN, URINE  Negative   LEGIONELLA URINARY ANTIGEN  Negative     Results from last 7 days   Lab Units 10/11/19  1416 10/11/19  1405   BLOOD CULTURE  No Growth at 24 hrs  No Growth at 24 hrs         ED Treatment:   Medication Administration - No Administrations Displayed (No Start Event Found)     None        Past Medical History:   Diagnosis Date    Arthritis     Aspiration into airway     Aspiration pneumonia (HCC)     Hypertension     Hypothyroidism     Parkinson's disease ([facility] [address] )     Thyroid disease      Present on Admission:   Other dysphagia   Aspiration pneumonia (HCC)   Gait abnormality   Paroxysmal A-fib (HCC)   Parkinson's disease ([facility] [address] )   Hypothyroidism   Elevated blood pressure reading   Weight loss   Hypokalemia      Admitting Diagnosis: Esophageal dilatation [K22 8]  Other dysphagia [R13 19]  Age/Sex: 80 y o  female  Admission Orders:    Medications:  acetylcysteine 3 mL Nebulization Q6H   azelastine 1 spray Each Nare BID   carbidopa-levodopa 2 tablet Oral 4x Daily   carbidopa-levodopa 0 5 tablet Oral 4x Daily   cefTRIAXone 1,000 mg Intravenous Q24H   enoxaparin 40 mg Subcutaneous Daily   ipratropium-albuterol 3 mL Nebulization Q6H   lactobacillus acidophilus-bulgaricus 1 packet Oral TID With Meals   melatonin 3 mg Oral HS   metroNIDAZOLE 500 mg Intravenous Q8H   multivitamin with iron-minerals 15 mL Oral Daily   thyroid 15 mg Oral Daily       multi-electrolyte 50 mL/hr Intravenous Continuous       acetaminophen 650 mg Oral Q6H PRN   bisacodyl 10 mg Rectal Daily PRN   dextran 70-hypromellose 1 drop Both Eyes Q3H PRN   dextromethorphan-guaiFENesin 5 mL Oral Q4H PRN   hydrALAZINE 5 mg Intravenous Q6H PRN   ondansetron 4 mg Intravenous Q6H PRN       IP CONSULT TO PULMONOLOGY  IP CONSULT TO GASTROENTEROLOGY    Network Utilization Review Department  Phone: [phone]; Fax [phone]  [email or web address]  ATTENTION: Please call with any questions or concerns to [phone]  and carefully listen to the prompts so that you are directed to the right person  Send all requests for admission clinical reviews, approved or denied determinations and any other requests to fax [phone]   All voicemails are confidential

## 2019-10-13 NOTE — CONSULTS
Consultation - 126 [facility]  Saiapoorva Brink 80 y o  female MRN: [id number]  Unit/Bed#: 2 [place] [address] A Encounter: [id number]         Reason for Consult / Principal Problem:  Peg tube placement    HPI:  Muriel Simpson is an 49-year-old female with history of Parkinson's disease complicated by or pharyngeal dysphagia, paroxysmal atrial fibrillation, and hypothyroidism  Patient was planned to have outpatient PEG tube placement on Friday with Dr Marichuy Harp, however prior to the procedure patient was found to have rhonchi on exam and required oxygen to maintain saturation at 93%  Patient was then admitted for presumed aspiration pneumonia  She is currently on Flagyl and ceftriaxone  Her saturations today have been ranging between 97-98% on room air  Pulmonary is following  Review of Systems:    CONSTITUTIONAL: Denies any fever, chills, or rigors  Good appetite, and no recent weight loss  HEENT: No earache or tinnitus  Denies hearing loss or visual disturbances  CARDIOVASCULAR: No chest pain or palpitations  RESPIRATORY: Denies any cough, hemoptysis, shortness of breath or dyspnea on exertion  GASTROINTESTINAL: As noted in the History of Present Illness  GENITOURINARY: No problems with urination  Denies any hematuria or dysuria  NEUROLOGIC: No dizziness or vertigo, denies headaches  MUSCULOSKELETAL: Denies any muscle or joint pain  SKIN: Denies skin rashes or itching  ENDOCRINE: Denies excessive thirst  Denies intolerance to heat or cold  PSYCHOSOCIAL: Denies depression or anxiety  Denies any recent memory loss         Historical Information   Past Medical History:   Diagnosis Date    Arthritis     Aspiration into airway     Aspiration pneumonia (HCC)     Hypertension     Hypothyroidism     Parkinson's disease ([address] )     Thyroid disease      Past Surgical History:   Procedure Laterality Date    UT CYSTOURETHROSCOPY,URETER CATHETER Left 11/23/2018    Procedure: CYSTOSCOPY RETROGRADE PYELOGRAM WITH INSERTION STENT URETERAL,BALLOON DILATION OF STRICTURE;  Surgeon: Baylee Pelaez MD;  Location: [facility];  Service: Urology     Social History   Social History     Substance and Sexual Activity   Alcohol Use Never    Frequency: Never     Social History     Substance and Sexual Activity   Drug Use No     Social History     Tobacco Use   Smoking Status Never Smoker   Smokeless Tobacco Never Used     Family History   Problem Relation Age of Onset    Hypertension Mother     Lung cancer Father         Meds/Allergies     Current Facility-Administered Medications   Medication Dose Route Frequency    acetaminophen (TYLENOL) tablet 650 mg  650 mg Oral Q6H PRN    acetylcysteine (MUCOMYST) 200 mg/mL inhalation solution 600 mg  3 mL Nebulization Q6H    azelastine (ASTELIN) 0 1 % nasal spray 1 spray  1 spray Each Nare BID    bisacodyl (DULCOLAX) rectal suppository 10 mg  10 mg Rectal Daily PRN    carbidopa-levodopa (SINEMET CR)  mg per ER tablet 2 tablet  2 tablet Oral 4x Daily    carbidopa-levodopa (SINEMET)  mg per tablet 0 5 tablet  0 5 tablet Oral 4x Daily    cefTRIAXone (ROCEPHIN) IVPB (premix) 1,000 mg  1,000 mg Intravenous Q24H    dextran 70-hypromellose (GENTEAL TEARS) 0 1-0 3 % ophthalmic solution 1 drop  1 drop Both Eyes Q3H PRN    dextromethorphan-guaiFENesin (ROBITUSSIN DM)  mg/5 mL oral syrup 5 mL  5 mL Oral Q4H PRN    enoxaparin (LOVENOX) subcutaneous injection 40 mg  40 mg Subcutaneous Daily    hydrALAZINE (APRESOLINE) injection 5 mg  5 mg Intravenous Q6H PRN    ipratropium-albuterol (DUO-NEB) 0 5-2 5 mg/3 mL inhalation solution 3 mL  3 mL Nebulization Q6H    lactobacillus acidophilus-bulgaricus (FLORANEX) packet 1 packet  1 packet Oral TID With Meals    melatonin tablet 3 mg  3 mg Oral HS    metroNIDAZOLE (FLAGYL) IVPB (premix) 500 mg  500 mg Intravenous Q8H    multi-electrolyte (PLASMALYTE-A/ISOLYTE-S PH 7 4) IV solution  50 mL/hr Intravenous Continuous    multivitamin with iron-minerals liquid 15 mL  15 mL Oral Daily    ondansetron (ZOFRAN) injection 4 mg  4 mg Intravenous Q6H PRN    thyroid (ARMOUR) tablet 15 mg  15 mg Oral Daily       No Known Allergies      Objective     Blood pressure (!) 175/70, pulse 70, temperature 97 5 °F (36 4 °C), temperature source Oral, resp  rate 18, height 5' 1" (1 549 m), weight 50 8 kg (112 lb), SpO2 97 %  Intake/Output Summary (Last 24 hours) at 10/13/2019 1134  Last data filed at 10/12/2019 1750  Gross per 24 hour   Intake 180 ml   Output 300 ml   Net -120 ml         PHYSICAL EXAM:      General Appearance:   Sleeping comfortably  Patient's caretaker and  are at the bedside  She appears to be in no respiratory distress   HEENT:   Normocephalic, atraumatic, anicteric      Neck:  Supple, symmetrical, trachea midline   Lungs:   Clear to auscultation bilaterally; no rales, rhonchi or wheezing; respirations unlabored    Heart[de-identified]   S1 and S2 normal; regular rate and rhythm; no murmur, rub, or gallop     Abdomen:   Soft, non-tender, non-distended; normal bowel sounds; no masses, no organomegaly    Genitalia:   Deferred    Rectal:   Deferred    Extremities:  No cyanosis, clubbing or edema    Pulses:  2+ and symmetric all extremities    Skin:  Skin color, texture, turgor normal, no rashes or lesions    Lymph nodes:  No palpable cervical or supraclavicular lymphadenopathy        Lab Results:   Results from last 7 days   Lab Units 10/12/19  0548   WBC Thousand/uL 5 40   HEMOGLOBIN g/dL 12 6   HEMATOCRIT % 39 3   PLATELETS Thousands/uL 348   NEUTROS PCT % 71   LYMPHS PCT % 21   MONOS PCT % 7   EOS PCT % 1     Results from last 7 days   Lab Units 10/13/19  0528  10/11/19  1405   POTASSIUM mmol/L 4 2   < > 4 0   CHLORIDE mmol/L 105   < > 104   CO2 mmol/L 28   < > 31   BUN mg/dL 11   < > 14   CREATININE mg/dL 0 66   < > 0 81   CALCIUM mg/dL 9 0   < > 9 5   ALK PHOS U/L  --   --  101   ALT U/L  --   --  <6*   AST U/L  --   --  34    < > = values in this interval not displayed  Results from last 7 days   Lab Units 10/11/19  1405   INR  1 04           Imaging Studies: I have personally reviewed pertinent imaging studies  Xr Chest Portable    Result Date: 10/11/2019  Impression: Small bilateral pleural effusions, right side greater than left, with bilateral lower lobe infiltrates, right side greater than left  Given the history, aspiration pneumonia not excluded  Follow-up recommended  Workstation performed: [id number]       ASSESSMENT and PLAN:      1) Oropharyngeal dysphagia complicated by aspiration pneumonia - Patient with dysphagia likely secondary to her known Parkinson's disease  She is currently hospitalized after she was found to be unstable for PEG tube placement on Friday with Dr Sandra Arce  - Discussed the case with Dr Lorene Gifford from pulmonary, she will likely be able to having EGD with PEG tube placement tomorrow based on her current clinical status  - Again, I explained how the PEG tube was placed with patient's  and her caretaker  - Agree with re-evaluation by speech pathology  - Will plan for EGD with possible PEG tube placement tomorrow  - NPO after midnight    The patient was seen and examined by Dr Yarely Izaguirre, all rangel medical decisions were made with Dr Yarely Izaguirre  Thank you for allowing us to participate in the care of this pleasant patient  We will follow up with you closely

## 2019-10-13 NOTE — PROGRESS NOTES
Progress Note - Pulmonary   Tarri Sandhoff 80 y o  female MRN: [id number]  Unit/Bed#: 2 [place] [address] A Encounter: [id number]    Assessment:  Acute respiratory insufficiency improved   Aspiration pneumonia  History of Parkinson's  History of paroxysmal atrial fibrillation    Plan:  Currently she is saturating well  97% on room air  Continue with nebulizer treatments DuoNeb as well as Mucomyst  Complete course of antibiotic with IV ceftriaxone and Flagyl total duration of 1 week  Aspiration precautions  Will order the chest x-ray for a m , and likely PEG  tube can be scheduled for tomorrow morning  Chief Complaint:   ASPIRATION PNEUMONIA    Subjective:   On questioning how she is doing, she stated tired,  at the bedside, stating she wanted to walk to the bathroom last night to with the walker  Only occasional cough  Objective:     Vitals: Blood pressure (!) 175/70, pulse 70, temperature 97 5 °F (36 4 °C), temperature source Oral, resp  rate 18, height 5' 1" (1 549 m), weight 50 8 kg (112 lb), SpO2 97 %  ,Body mass index is 21 16 kg/m²  Intake/Output Summary (Last 24 hours) at 10/13/2019 0953  Last data filed at 10/12/2019 1750  Gross per 24 hour   Intake 180 ml   Output 300 ml   Net -120 ml       Invasive Devices     Peripheral Intravenous Line            Peripheral IV 10/12/19 Dorsal (posterior); Left Forearm less than 1 day          Drain            External Urinary Catheter less than 1 day                Physical Exam:  Not in any acute respiratory distress  Eyes anicteric sclera, conjunctivae is clear  ENT nares is patent, no evidence of any discharge  Lungs diminished breath sounds bilaterally no rhonchi  Heart first and second heart sounds are heard no murmur or gallop is heard  Extremities trace pedal edema  CNS awake alert    Labs: CBC: No results found for: WBC, HGB, HCT, MCV, PLT, ADJUSTEDWBC, MCH, MCHC, RDW, MPV, NRBC  Imaging and other studies: I have personally reviewed pertinent reports

## 2019-10-13 NOTE — PLAN OF CARE
Problem: Nutrition/Hydration-ADULT  Goal: Nutrient/Hydration intake appropriate for improving, restoring or maintaining nutritional needs  Description  Monitor and assess patient's nutrition/hydration status for malnutrition  Collaborate with interdisciplinary team and initiate plan and interventions as ordered  Monitor patient's weight and dietary intake as ordered or per policy  Utilize nutrition screening tool and intervene as necessary  Determine patient's food preferences and provide high-protein, high-caloric foods as appropriate       INTERVENTIONS:  - Assess nutrition and hydration status and recommend course of action  - Assist patient with eating if necessary   - Allow adequate time for meals  - Recommend/ encourage appropriate diets, oral nutritional supplements, and vitamin/mineral supplements  - Order, calculate, and assess calorie counts as needed  - Provide specific nutrition/hydration education as appropriate  - Include patient/family/caregiver in decisions related to nutrition   Outcome: Progressing     Problem: RESPIRATORY - ADULT  Goal: Achieves optimal ventilation and oxygenation  Description  INTERVENTIONS:  - Assess for changes in respiratory status  - Assess for changes in mentation and behavior  - Position to facilitate oxygenation and minimize respiratory effort  - Oxygen administered by appropriate delivery if ordered  - Assess the need for suctioning and aspirate as needed  - Respiratory Therapy support as indicated  - Nebulizer    Outcome: Progressing     Problem: Prexisting or High Potential for Compromised Skin Integrity  Goal: Skin integrity is maintained or improved  Description  INTERVENTIONS:  - Assess and monitor skin integrity  - Assess and monitor nutrition and hydration status  - Monitor labs   - Assess for incontinence   - Turn and reposition patient  - Assist with mobility/ambulation  - Relieve pressure over bony prominences  - Avoid friction and shearing  - Evaluate need for skin moisturizer/barrier cream     Outcome: Progressing     Problem: Potential for Falls  Goal: Patient will remain free of falls  Description  INTERVENTIONS:  - Assess patient frequently for physical needs  -  Identify cognitive and physical deficits and behaviors that affect risk of falls    -  [place] fall precautions as indicated by assessment   - Educate patient/family on patient safety including physical limitations  - Instruct patient to call for assistance with activity based on assessment  - Modify environment to reduce risk of injury  - Consider OT/PT consult to assist with strengthening/mobility  Outcome: Progressing

## 2019-10-14 NOTE — PROGRESS NOTES
Progress Note - Pulmonary   Vlaeriaon Hadley 80 y o  female MRN: [id number]  Unit/Bed#: 2 [place] [address] A Encounter: [id number]      Assessment: and Plan:  Acute respiratory insufficiency  Aspiration pneumonia  Parkinson's disease  Paroxysmal atrial fibrillation    Pulmonary status appears stable  Continue bronchodilators  Check AM procalcitonin  If negative, no further antibiotics needed  Continue strict aspiration precautions  Okay from pulmonary standpoint to proceed with PEG tube placement  DVT prophylaxis    Plan of care discussed with patient, family and caretaker at bedside  Subjective:   Found lying in bed with  caretaker at bedside  No current complaints  No coughing or shortness of breath  Objective:   Afebrile    Vitals: Blood pressure (!) 176/78, pulse 64, temperature 98 3 °F (36 8 °C), temperature source Oral, resp  rate 16, height 5' 1" (1 549 m), weight 50 8 kg (112 lb), SpO2 95 %  , 2LPM, Body mass index is 21 16 kg/m²  Intake/Output Summary (Last 24 hours) at 10/14/2019 1309  Last data filed at 10/14/2019 0608  Gross per 24 hour   Intake 180 ml   Output 1000 ml   Net -820 ml       Physical Exam  Gen: Awake, alert, oriented x 3, no acute distress, comfortable  HEENT: Mucous membranes moist  NECK: No accessory muscle use  Cardiac:  Regular  Lungs:  Diminished at the bilateral bases  Abdomen: soft nontender, nondistended  Extremities:  No clubbing cyanosis edema  Skin:  Warm, dry, no visible rash    Labs: I have personally reviewed pertinent lab results    Results from last 7 days   Lab Units 10/12/19  0548 10/11/19  1416 10/11/19  1405   WBC Thousand/uL 5 40  --  5 42   HEMOGLOBIN g/dL 12 6  --  14 1   HEMATOCRIT % 39 3  --  44 4   PLATELETS Thousands/uL 348 387 422*   NEUTROS PCT % 71  --  67   MONOS PCT % 7  --  4      Results from last 7 days   Lab Units 10/13/19  0528 10/12/19  0548 10/11/19  1405   POTASSIUM mmol/L 4 2 3 4* 4 0   CHLORIDE mmol/L 105 105 104   CO2 mmol/L 28 28 31   BUN mg/dL 11 12 14   CREATININE mg/dL 0 66 0 70 0 81   CALCIUM mg/dL 9 0 8 6 9 5   ALK PHOS U/L  --   --  101   ALT U/L  --   --  <6*   AST U/L  --   --  34     Results from last 7 days   Lab Units 10/11/19  1405   MAGNESIUM mg/dL 2 4     Results from last 7 days   Lab Units 10/11/19  1405   PHOSPHORUS mg/dL 3 2      Results from last 7 days   Lab Units 10/11/19  1405   INR  1 04   PTT seconds 29     Results from last 7 days   Lab Units 10/11/19  1405   LACTIC ACID mmol/L 0 7     0   Lab Value Date/Time    TROPONINI 0 02 10/01/2019 1229    TROPONINI 0 18 (H) 04/20/2019 0832    TROPONINI 0 25 (H) 04/20/2019 0450    TROPONINI 0 20 (H) 04/20/2019 0036    TROPONINI 0 05 (H) 04/19/2019 2130    TROPONINI 0 02 04/19/2019 1836    TROPONINI <0 02 04/17/2019 1338    TROPONINI <0 02 10/25/2018 1430    TROPONINI 0 03 02/23/2017 1309    TROPONINI 0 29 (H) 02/26/2016 0217    TROPONINI 0 42 (H) 02/25/2016 2036    TROPONINI 0 04 (H) 02/25/2016 1042     Labs reviewed by me personally reveal normal basic metabolic profile  Microbiology:  Legionella urine antigen negative  Strep pneumo urine antigen negative  Blood cultures x2 no growth to date      Imaging and other studies: I have personally reviewed pertinent films in PACS  Chest x-ray reviewed by me personally shows consolidation at the right lower lobe improved  [facility]

## 2019-10-14 NOTE — PROGRESS NOTES
[facility] Progress Note  Patient: Pily Holder 80 y o  female   MRN: [id number]  PCP: Bella Rojo  Unit/Bed#: 2 [place] [address] A Encounter: [id number]  Date Of Visit: 10/14/19    Problem List:    Principal Problem:    Aspiration pneumonia ([facility] [address] )  Active Problems:    Parkinson's disease ([facility] [address] )    Other dysphagia    Elevated blood pressure reading    Gait abnormality    Paroxysmal A-fib ([facility] [address] )    Hypothyroidism    Weight loss      Assessment & Plan:    * [facility] )  Assessment & Plan  Patient with history of parkinsonism with oropharyngeal dysphagia and possible esophageal dysmotility  Patient was scheduled for PEG tube placement on the day of admission but noted to have increasing cough shortness of breath and hypoxia, procedure was postponed  Chest x-ray with possibility of small bilateral infiltrate and effusion  Remains afebrile  Normal white count  Oxygen requirement is improving   Blood cultures negative so far  Urine Legionella pneumococcal antigen negative  Follow-up Speech and swallow evaluation, aspiration precaution  Continue IV ceftriaxone and metronidazole for now  Bronchodilators, acetylcysteine and chest PT  Pulmonary following, input appreciated  Repeat chest x-ray reports improvement  Check procalcitonin in a m        Elevated blood pressure reading  Assessment & Plan  Patient was noted to have elevated blood on presentation  Asymptomatic  Family reports that usually blood pressure on low  Will observe at present  IV hydralazine as needed for blood pressure more than 180/110    Other dysphagia  Assessment & Plan  Secondary to parkinsonism  History of mild-to-moderate oropharyngeal dysphagia with silent aspiration  Patient and family is now agreeable for PEG tube placement  Continue aspiration precaution  Speech and swallow evaluation  Continue puree with honey thick liquid diet for now  Will minimize pills as patient seems to have difficulty in swallowing  GI input appreciated, plan for PEG tube today    Parkinson's disease [facility])  Assessment & Plan  Resume home dose of Sinemet 4 times a day  PT/OT    Gait abnormality  Assessment & Plan  PT/OT    Weight loss  Assessment & Plan  Secondary to poor p o  Intake and dysphagia   Nutrition evaluation  Awaiting PEG    Hypothyroidism  Assessment & Plan  TSH within normal limit  Continue thyroid supplement    Paroxysmal A-fib [facility])  Assessment & Plan  Not a candidate for anticoagulation  Patient was previously on aspirin 81 mg daily  Will verify with family and resume after the procedure        Hypokalemia-resolved as of 10/13/2019  Assessment & Plan  Replete IV        VTE Pharmacologic Prophylaxis:   Pharmacologic: Enoxaparin (Lovenox)  Mechanical VTE Prophylaxis in Place: Yes    Patient Centered Rounds: I have performed bedside rounds with nursing staff today  Discussions with Specialists or Other Care Team Provider:  Yes    Education and Discussions with Family / Patient:  Patient & Family    Time Spent for Care: 45 minutes  More than 50% of total time spent on counseling and coordination of care as described above  Current Length of Stay: 3 day(s)    Current Patient Status: Inpatient   Certification Statement: The patient will continue to require additional inpatient hospital stay due to Aspiration pneumonia, persistent secretion    Discharge Plan:  Home with 24 hour care versus rehab    Code Status: Level 1 - Full Code      Subjective:   Comfortably sitting in bed  Family reports improvement in cough  Denies shortness of breath  Scheduled for PEG tube today    Objective:     Vitals:   Temp (24hrs), Av 2 °F (36 2 °C), Min:95 6 °F (35 3 °C), Max:98 3 °F (36 8 °C)    Temp:  [95 6 °F (35 3 °C)-98 3 °F (36 8 °C)] 95 6 °F (35 3 °C)  HR:  [64-79] 79  Resp:  [16-18] 18  BP: (162-176)/(69-78) 162/69  SpO2:  [92 %-97 %] 92 % on room air  Body mass index is 21 16 kg/m²       Input and Output Summary (last 24 hours): Intake/Output Summary (Last 24 hours) at 10/14/2019 1535  Last data filed at 10/14/2019 0608  Gross per 24 hour   Intake 60 ml   Output 1000 ml   Net -940 ml       Physical Exam:     Physical Exam   Constitutional: She appears well-developed  No distress  Frail chronically ill, cachectic   HENT:   Head: Normocephalic and atraumatic  Eyes: Pupils are equal, round, and reactive to light  Conjunctivae are normal    Neck: Normal range of motion  Cardiovascular: Normal rate and regular rhythm  No murmur heard  Pulmonary/Chest: Effort normal and breath sounds normal  No respiratory distress  She has no wheezes  She has no rales  Diminished   Abdominal: Soft  Bowel sounds are normal  She exhibits no distension  There is no tenderness  There is no rebound and no guarding  Musculoskeletal: She exhibits no edema  Neurological: She is alert  No cranial nerve deficit  At baseline   Skin: Skin is warm and dry  No rash noted  Additional Data:     Labs:    Results from last 7 days   Lab Units 10/12/19  0548   WBC Thousand/uL 5 40   HEMOGLOBIN g/dL 12 6   HEMATOCRIT % 39 3   PLATELETS Thousands/uL 348   NEUTROS PCT % 71   LYMPHS PCT % 21   MONOS PCT % 7   EOS PCT % 1     Results from last 7 days   Lab Units 10/13/19  0528  10/11/19  1405   POTASSIUM mmol/L 4 2   < > 4 0   CHLORIDE mmol/L 105   < > 104   CO2 mmol/L 28   < > 31   BUN mg/dL 11   < > 14   CREATININE mg/dL 0 66   < > 0 81   CALCIUM mg/dL 9 0   < > 9 5   ALK PHOS U/L  --   --  101   ALT U/L  --   --  <6*   AST U/L  --   --  34    < > = values in this interval not displayed  Results from last 7 days   Lab Units 10/11/19  1405   INR  1 04       * I Have Reviewed All Lab Data Listed Above  * Additional Pertinent Lab Tests Reviewed:  All Labs Within Last 24 Hours Reviewed      Imaging:  Imaging Reports Reviewed Today Include:  Chest x-ray    Recent Cultures (last 7 days):     Results from last 7 days   Lab Units 10/11/19  1814 10/11/19  1416 10/11/19  1405   BLOOD CULTURE   --  No Growth at 48 hrs  No Growth at 48 hrs  LEGIONELLA URINARY ANTIGEN  Negative  --   --        Last 24 Hours Medication List:     Current Facility-Administered Medications:  acetaminophen 650 mg Oral Q6H PRN Beckie Santoyo MD    acetylcysteine 3 mL Nebulization Q6H Beckie Santoyo MD    azelastine 1 spray Each Nare BID Beckie Santoyo MD    bisacodyl 10 mg Rectal Daily PRN Beckie Santoyo MD    carbidopa-levodopa 2 tablet Oral 4x Daily Mayco FosalvadorgiMAZIN meade    carbidopa-levodopa 0 5 tablet Oral 4x Daily Mayco Fothergill, ELLIOTNP    ceFAZolin 2,000 mg Intravenous Once Jus Jaime MD    cefTRIAXone 1,000 mg Intravenous Q24H Beckie Santoyo MD Last Rate: 1,000 mg (10/13/19 1620)   dextran 70-hypromellose 1 drop Both Eyes Q3H PRN Beckie Santoyo MD    dextromethorphan-guaiFENesin 5 mL Oral Q4H PRN Beckie Santoyo MD    enoxaparin 40 mg Subcutaneous Daily Beckie Santoyo MD    hydrALAZINE 5 mg Intravenous Q6H PRN Beckie Santoyo MD    ipratropium-albuterol 3 mL Nebulization Q6H Beckie Santoyo MD    lactated ringers 75 mL/hr Intravenous Continuous Margarita Cunningham MD    lactobacillus acidophilus-bulgaricus 1 packet Oral TID With Meals Beckie Santoyo MD    melatonin 3 mg Oral HS Beckie Santoyo MD    metroNIDAZOLE 500 mg Intravenous Q8H Beckie Santoyo MD Last Rate: 500 mg (10/14/19 3956)   multivitamin with iron-minerals 15 mL Oral Daily Beckie Santoyo MD    ondansetron 4 mg Intravenous Q6H PRN Beckie Santoyo MD    thyroid 15 mg Oral Daily Beckie Santoyo MD           Today, Patient Was Seen By: Beckie Santoyo MD    ** Please Note: "This note has been constructed using a voice recognition system  Therefore there may be syntax, spelling, and/or grammatical errors   Please call if you have any questions  "**

## 2019-10-14 NOTE — ANESTHESIA PREPROCEDURE EVALUATION
Review of Systems/Medical History  Patient summary reviewed  Chart reviewed  No history of anesthetic complications     Cardiovascular  Hypertension , Dysrhythmias (PAF) , atrial fibrillation,    Pulmonary  Pneumonia (recent aspiration PNA with associated hypoxia),   Comment: O2 sat 95% on room air     GI/Hepatic  Dysphagia,    Hiatal hernia,             Endo/Other  History of thyroid disease , hypothyroidism,      GYN       Hematology   Musculoskeletal    Arthritis     Neurology    Neuromuscular disease (parkinson's disease) ,    Psychology           Physical Exam    Airway    Mallampati score: II  TM Distance: >3 FB  Neck ROM: full     Dental       Cardiovascular  Rhythm: regular, Rate: normal,     Pulmonary  Breath sounds clear to auscultation,     Other Findings        Anesthesia Plan  ASA Score- 3     Anesthesia Type- IV sedation with anesthesia with ASA Monitors  Additional Monitors:   Airway Plan:         Plan Factors-    Induction- intravenous  Postoperative Plan-     Informed Consent- Anesthetic plan and risks discussed with patient  I personally reviewed this patient with the CRNA  Discussed and agreed on the Anesthesia Plan with the CRNA  Ian Haley

## 2019-10-14 NOTE — PLAN OF CARE
Problem: RESPIRATORY - ADULT  Goal: Achieves optimal ventilation and oxygenation  Description  INTERVENTIONS:  - Assess for changes in respiratory status  - Assess for changes in mentation and behavior  - Position to facilitate oxygenation and minimize respiratory effort  - Oxygen administered by appropriate delivery if ordered  - Assess the need for suctioning and aspirate as needed  - Respiratory Therapy support as indicated  - Nebulizer    Outcome: Adequate for Discharge

## 2019-10-14 NOTE — ANESTHESIA POSTPROCEDURE EVALUATION
Post-Op Assessment Note    CV Status:  Stable  Pain Score: 0    Pain management: adequate     Mental Status:  Alert and awake   Hydration Status:  Euvolemic   PONV Controlled:  Controlled   Airway Patency:  Patent   Post Op Vitals Reviewed: Yes      Staff: Anesthesiologist, CRNA           BP      Temp     Pulse     Resp      SpO2

## 2019-10-14 NOTE — SPEECH THERAPY NOTE
Speech Language/Pathology    Speech/Language Pathology Progress Note    Patient Name: Vinicio Bragg  YSACH'F Date: 10/14/2019     Orders received for bedside swallow eval and chart reviewed  Pt is known to this SLP from previous encounters and phone calls from  re swallowing decline  Pt has increased symptoms of dysphagia prior to first sinemet dose in AM and ~ 30 minutes prior to next dose with increased difficulty taking sinemet to improve symptoms  Pt is currently NPO for EGD with possible PEG tube placement        Recommendations:  -Rx holding pt NPO until pt can be evaluated by SLP for safety of PO/least restrictive diet         Bubba Talbot  Speech-Language Pathologist  Available via [facility] #[id number]  [place] #[id number]

## 2019-10-14 NOTE — SOCIAL WORK
Contacted pt's spouse Krystal Osman to discuss STR recommendations  At the time, pt was about to go for her peg-tube placement and spouse wanted to discuss it with daughter as well but was amendable to STR  He inquired about [facility] and agreeable to a referral  Referral placed  CM attempted to reach out to spouse again to discuss but unable to reach and voicemail on cell phone was not setup  Voicemail left on home number  Will continue to follow-up

## 2019-10-14 NOTE — PROGRESS NOTES
[facility] Progress Note  Patient: Vinicio Bragg 80 y o  female   MRN: [id number]  PCP: Raheel Spicer  Unit/Bed#: 2 [address] Encounter: [id number]  Date Of Visit: 10/13/19    Problem List:    Principal Problem:    Aspiration pneumonia ([facility] [address] )  Active Problems:    Parkinson's disease ([facility] )    Other dysphagia    Elevated blood pressure reading    Gait abnormality    Paroxysmal A-fib ([facility] )    Hypothyroidism    Weight loss      Assessment & Plan:    * Aspiration pneumonia ([facility] [address] )  Assessment & Plan  Patient with history of parkinsonism with oropharyngeal dysphagia and possible esophageal dysmotility  Patient was scheduled for PEG tube placement on the day of admission but noted to have increasing cough shortness of breath and hypoxia, procedure was postponed  Chest x-ray with possibility of small bilateral infiltrate and effusion  Remains afebrile  Normal white count  Oxygen requirement is improving   Blood cultures negative so far  Urine Legionella pneumococcal antigen negative, sputum culture if possible  Follow-up Speech and swallow evaluation, aspiration precaution  Continue IV ceftriaxone and metronidazole for now  Bronchodilators, acetylcysteine and chest PT  Pulmonary evaluation appreciated, follow-up chest x-ray in a m        Elevated blood pressure reading  Assessment & Plan  Patient was noted to have elevated blood on presentation  Asymptomatic  Family reports that usually blood pressure on low  Will observe at present  IV hydralazine as needed for blood pressure more than 180/110    Other dysphagia  Assessment & Plan  Secondary to parkinsonism  History of mild-to-moderate oropharyngeal dysphagia with silent aspiration  Patient and family is now agreeable for PEG tube placement  Continue aspiration precaution  Speech and swallow evaluation  Continue puree with honey thick liquid diet for now  Will minimize pills as patient seems to have difficulty in swallowing  Consult GI to Reschedule PEG tube, likely in a m  Parkinson's disease [facility])  Assessment & Plan  Resume home dose of Sinemet 4 times a day  PT/OT    Gait abnormality  Assessment & Plan  PT/OT    Weight loss  Assessment & Plan  Secondary to poor p o  Intake and dysphagia   Nutrition evaluation  Awaiting PEG    Hypothyroidism  Assessment & Plan  TSH within normal limit  Continue thyroid supplement    Paroxysmal A-fib [facility])  Assessment & Plan  Not a candidate for anticoagulation  Patient was previously on aspirin 81 mg daily  Will verify with family        Hypokalemia-resolved as of 10/13/2019  Assessment & Plan  Replete IV        VTE Pharmacologic Prophylaxis:   Pharmacologic: Enoxaparin (Lovenox)  Mechanical VTE Prophylaxis in Place: Yes    Patient Centered Rounds: I have performed bedside rounds with nursing staff today  Discussions with Specialists or Other Care Team Provider:  Yes, pulmonary    Education and Discussions with Family / Patient:  Patient & Family    Time Spent for Care: 45 minutes  More than 50% of total time spent on counseling and coordination of care as described above  Current Length of Stay: 2 day(s)    Current Patient Status: Inpatient   Certification Statement: The patient will continue to require additional inpatient hospital stay due to Aspiration pneumonia, persistent secretion    Discharge Plan:  Home with 24 hour care versus rehab    Code Status: Level 1 - Full Code      Subjective:   Feels much better today  Denies any significant secretion or cough  Denies shortness of breath  Appears more alert and energetic today    Objective:     Vitals:   Temp (24hrs), Av °F (36 7 °C), Min:97 5 °F (36 4 °C), Max:98 2 °F (36 8 °C)    Temp:  [97 5 °F (36 4 °C)-98 2 °F (36 8 °C)] 98 1 °F (36 7 °C)  HR:  [65-74] 70  Resp:  [16-18] 16  BP: (132-180)/(62-74) 156/64  SpO2:  [95 %-100 %] 97 % on room air  Body mass index is 21 16 kg/m²       Input and Output Summary (last 24 hours):     No intake or output data in the 24 hours ending 10/13/19 2028    Physical Exam:     Physical Exam   Constitutional: She appears well-developed  She appears cachectic  No distress  Frail, chronically ill   HENT:   Head: Normocephalic and atraumatic  Nose: Nose normal    Eyes: Pupils are equal, round, and reactive to light  Neck: Normal range of motion  Neck supple  Cardiovascular: Normal rate, regular rhythm and normal heart sounds  Pulmonary/Chest: Effort normal and breath sounds normal  No respiratory distress  She has no wheezes  She has no rales  Diminished   Abdominal: Soft  Bowel sounds are normal  She exhibits no distension  There is no tenderness  There is no rebound and no guarding  Musculoskeletal: She exhibits no edema  Neurological: She is alert  No cranial nerve deficit  At baseline   Skin: Skin is warm and dry  No rash noted  Additional Data:     Labs:    Results from last 7 days   Lab Units 10/12/19  0548   WBC Thousand/uL 5 40   HEMOGLOBIN g/dL 12 6   HEMATOCRIT % 39 3   PLATELETS Thousands/uL 348   NEUTROS PCT % 71   LYMPHS PCT % 21   MONOS PCT % 7   EOS PCT % 1     Results from last 7 days   Lab Units 10/13/19  0528  10/11/19  1405   POTASSIUM mmol/L 4 2   < > 4 0   CHLORIDE mmol/L 105   < > 104   CO2 mmol/L 28   < > 31   BUN mg/dL 11   < > 14   CREATININE mg/dL 0 66   < > 0 81   CALCIUM mg/dL 9 0   < > 9 5   ALK PHOS U/L  --   --  101   ALT U/L  --   --  <6*   AST U/L  --   --  34    < > = values in this interval not displayed  Results from last 7 days   Lab Units 10/11/19  1405   INR  1 04       * I Have Reviewed All Lab Data Listed Above  * Additional Pertinent Lab Tests Reviewed: All Labs Within Last 24 Hours Reviewed      Imaging:  Imaging Reports Reviewed Today Include:  Chest x-ray    Recent Cultures (last 7 days):     Results from last 7 days   Lab Units 10/11/19  1814 10/11/19  1416 10/11/19  1405   BLOOD CULTURE   --  No Growth at 24 hrs  No Growth at 24 hrs     LEGIONELLA URINARY ANTIGEN Negative  --   --        Last 24 Hours Medication List:     Current Facility-Administered Medications:  acetaminophen 650 mg Oral Q6H PRN Marita Sarabia MD    acetylcysteine 3 mL Nebulization Q6H Marita Sarabia MD    azelastine 1 spray Each Nare BID Marita Sarabia MD    bisacodyl 10 mg Rectal Daily PRN Marita Sarbaia MD    carbidopa-levodopa 2 tablet Oral 4x Daily MAZIN Pompa    carbidopa-levodopa 0 5 tablet Oral 4x Daily MAZIN [place]    cefTRIAXone 1,000 mg Intravenous Q24H Marita Sarabia MD Last Rate: 1,000 mg (10/13/19 1620)   dextran 70-hypromellose 1 drop Both Eyes Q3H PRN Marita Sarabia MD    dextromethorphan-guaiFENesin 5 mL Oral Q4H PRN Marita Sarabia MD    enoxaparin 40 mg Subcutaneous Daily Mairta Sarabia MD    hydrALAZINE 5 mg Intravenous Q6H PRN Marita Sarabia MD    ipratropium-albuterol 3 mL Nebulization Q6H Marita Sarabia MD    lactobacillus acidophilus-bulgaricus 1 packet Oral TID With Meals Marita Sarabia MD    melatonin 3 mg Oral HS Marita Sarabia MD    metroNIDAZOLE 500 mg Intravenous Q8H Marita Sarabia MD Last Rate: 500 mg (10/13/19 1448)   multi-electrolyte 50 mL/hr Intravenous Continuous Marita Sarabia MD Last Rate: 50 mL/hr (10/13/19 1745)   multivitamin with iron-minerals 15 mL Oral Daily Marita Sarabia MD    ondansetron 4 mg Intravenous Q6H PRN Marita Sarabia MD    thyroid 15 mg Oral Daily Marita Sarabia MD           Today, Patient Was Seen By: Marita Sarabia MD    ** Please Note: "This note has been constructed using a voice recognition system  Therefore there may be syntax, spelling, and/or grammatical errors   Please call if you have any questions  "**

## 2019-10-14 NOTE — PLAN OF CARE
Problem: Nutrition/Hydration-ADULT  Goal: Nutrient/Hydration intake appropriate for improving, restoring or maintaining nutritional needs  Description  Monitor and assess patient's nutrition/hydration status for malnutrition  Collaborate with interdisciplinary team and initiate plan and interventions as ordered  Monitor patient's weight and dietary intake as ordered or per policy  Utilize nutrition screening tool and intervene as necessary  Determine patient's food preferences and provide high-protein, high-caloric foods as appropriate       INTERVENTIONS:  - Assess nutrition and hydration status and recommend course of action  - Assist patient with eating if necessary   - Allow adequate time for meals  - Recommend/ encourage appropriate diets, oral nutritional supplements, and vitamin/mineral supplements  - Order, calculate, and assess calorie counts as needed  - Provide specific nutrition/hydration education as appropriate  - Include patient/family/caregiver in decisions related to nutrition   Outcome: Progressing     Problem: RESPIRATORY - ADULT  Goal: Achieves optimal ventilation and oxygenation  Description  INTERVENTIONS:  - Assess for changes in respiratory status  - Assess for changes in mentation and behavior  - Position to facilitate oxygenation and minimize respiratory effort  - Oxygen administered by appropriate delivery if ordered  - Assess the need for suctioning and aspirate as needed  - Respiratory Therapy support as indicated  - Nebulizer    Outcome: Progressing     Problem: Prexisting or High Potential for Compromised Skin Integrity  Goal: Skin integrity is maintained or improved  Description  INTERVENTIONS:  - Assess and monitor skin integrity  - Assess and monitor nutrition and hydration status  - Monitor labs   - Assess for incontinence   - Turn and reposition patient  - Assist with mobility/ambulation  - Relieve pressure over bony prominences  - Avoid friction and shearing  - Evaluate need for skin moisturizer/barrier cream     Outcome: Progressing     Problem: Potential for Falls  Goal: Patient will remain free of falls  Description  INTERVENTIONS:  - Assess patient frequently for physical needs  -  Identify cognitive and physical deficits and behaviors that affect risk of falls    -  [place] fall precautions as indicated by assessment   - Educate patient/family on patient safety including physical limitations  - Instruct patient to call for assistance with activity based on assessment  - Modify environment to reduce risk of injury  - Consider OT/PT consult to assist with strengthening/mobility  Outcome: Progressing

## 2019-10-15 NOTE — SPEECH THERAPY NOTE
Speech Language/Pathology  Today's Date: 10/15/2019     Problem List  Patient Active Problem List   Diagnosis    [facility]oxysmal A-fib ([facility] )    Parkinson's disease ([facility] [address] )    Hypothyroidism    Lethargy    Hyponatremia    Essential hypertension    Falls frequently    Influenza B    Acute metabolic encephalopathy    Weight loss    Gait abnormality    Arthritis    Thyroid disease    Acute pyelonephritis    Elevated alkaline phosphatase level    Hydronephrosis    Hiatal hernia    Esophageal dilatation    Other dysphagia    Aspiration pneumonia (HCC)    Elevated blood pressure reading       Past Medical History  Past Medical History:   Diagnosis Date    Arthritis     Aspiration into airway     Aspiration pneumonia (HCC)     Hypertension     Hypothyroidism     Parkinson's disease ([facility] [address] )     Thyroid disease        Past Surgical History  Past Surgical History:   Procedure Laterality Date    FL CYSTOURETHROSCOPY,URETER CATHETER Left 11/23/2018    Procedure: CYSTOSCOPY RETROGRADE PYELOGRAM WITH INSERTION STENT Marcelino Ontiveros OF STRICTURE;  Surgeon: Marilyn Spring MD;  Location: [facility];  Service: Urology       Summary    Pt presents with concerns for progressing mod-severe dysphagia with increased difficulty managing secretions and phlegm production  During encounter this AM pt displayed delayed significant coughing with NTL via tsp  Given pt's known progression would highly recommend repeat VBS with speech to assess for safest PO pleasure feeds per pt's request/interest to continue PO        Recommendations:   Diet: puree/level 1 diet and nectar thick liquids   Meds: non-oral if possible   Frequent Oral care: 4x/day  Aspiration precautions and compensatory swallowing strategies: upright posture, only feed when fully alert, slow rate of feeding, small bites/sips and alternating bites and sips  Other Recommendations/ considerations:   -VBS with speech to formally re-assess swallow function as pt has had rapid decline since previous study in April        Current Medical Status  Pt is a 80 y o  female who presented to [address] [place] [address]  on Friday for PEB placement  Placement was held til Monday 10/14/19 2/2 concern for aspiration pna  Pt now has PEG tube and will be receiving primary nutrition via TF  Pt is extremely interested in continuing some PO for pleasure that is safe/not putting her at increased risk for aspiration  PMH significant for Parkinson's dx'd 12 years ago with increased symptoms more recently  She has difficulty taking first dose of oral sinemet in AM 2/2 increased symptoms of dysphagia  Past medical history:   Please see H&P for details    Special Studies:  CXR 10/14/19-Improved right lung base aeration  Subsegmental left lung base atelectasis  Social/Education/Vocational Hx:  Pt lives with family    Swallow Information   Current Risks for Dysphagia & Aspiration: known history of dysphagia and known history of aspiration  Current Symptoms/Concerns: coughing with po and change in respiratory status  Current Diet: NPO with tube feeds   Baseline Diet: mechanically altered/level 2 diet and nectar thick liquids    Baseline Assessment   Behavior/Cognition: waxing and waning arousal level  Speech/Language Status: able to participate in basic conversation and able to follow commands  Patient Positioning: upright in bed     Swallow Mechanism Exam   Facial: symmetrical  Labial: WFL  Lingual: WFL  Velum: unable to visualize  Mandible: adequate ROM  Dentition: adequate  Vocal quality:weak and dysphonic   Volitional Cough: weak   Respiratory: RA    Consistencies Assessed and Performance   Consistencies Administered: nectar thick, honey thick and puree    Oral Stage: Pt fed tsps of HTL, NTL, and puree with functional retrieval but with concern for decreased oral control and ? Quick transfer  Pharyngeal Stage: Delayed swallow initiation which was weakened via palpation   Delayed coughing on noted after NTL via tsp  Discussed VBS to further assess for safest PO pleasure feeds         Esophageal Concerns: belching- recent PEG tube placement       Results Reviewed with: patient, RN, MD and family   Dysphagia Goals: pt will participate in VBS      Speech Therapy Prognosis   Prognosis: fair    Prognosis Considerations: age, medical status, prior medical history, progressive disease process, medically fragile status, respiratory status and therapeutic potential      Veronica WALKER  [address] Pathologist  Available via [place] [address] #[phone]  [place] #[id number]

## 2019-10-15 NOTE — SPEECH THERAPY NOTE
Speech Language/Pathology    Speech/Language Pathology Progress Note    Patient Name: Tiera Finley  SSHKD'K Date: 10/15/2019       SLP provided education to pt and family regarding their desire to resume PO pleasure  feeds  SLP expressed that we will need to find pt's least restrictive PO diet that does not put her at increased risk for aspiration  Discussed with caregiver the need to schedule PO around sinemet schedule so pt is not weaning of med and attempting to take PO  Family had several d/c concerns including education on TF, home suctioning, and routine needed for feeding/meds  SLP reached out to CM to discuss their concerns/needs  They also expressed concerns that pt received PO meds this AM including liquid and uncrushable sinemet which difficult as it cannot be put through PEG tube  SLP spoke with Dr Janet Hansen re family and SLP concerns with PO sinemet as the extended release cannot be crushed and taken through PEG tube  However, pt has significantly higher symptoms of dysphagia with decreased secretion management requiring suction  Dr Janet Hansen advised that family contacts neuro office again to see if they have any recommendations for d/c although they did not answer previous call  ADRIENNE DUEÑAS is questioning the ability to have pt take sinemet via PEG first to assist with symptom management and take extended release shortly after once pt is clinically improved  We will defer this to pt's neurology team  Family called while SLP was in the room and there was no answer  Family asked SLP to reach out to office to see if there is better return  This SLP spoke with clinical staff at office re concerns with taking oral sinemet when pt is displaying increased symptoms of dysphagia  Dr Morris George is out of the office today, but concerns and d/c recs will be forwarded to JM WALKER  CCC-SLP  Speech-Language Pathologist  Available via [employer]   [place] #[id number]  [place] #[id number]

## 2019-10-15 NOTE — TELEPHONE ENCOUNTER
Lets switch her to Sinemet IR 25/100mg 2tabs qid (8:30am, 11:30am, 2:30pm, 5:30pm) for now and then we can take our time to try and get Rytary approved if needed  Please have them call with an update on how she is doing with this medication change and if needed we can further discuss switching to Rytary  Also is she currently using Inbrija as ordered by Dr Jaylon Orta at the last visit for wearing off? Thanks!

## 2019-10-15 NOTE — PROCEDURES
Video Swallow Study      Patient Name: Rogers TYLER Date: 10/15/2019        Past Medical History  Past Medical History:   Diagnosis Date    Arthritis     Aspiration into airway     [facility]iration pneumonia ([facility] [address]     Hypertension     Hypothyroidism     Parkinson's disease ([facility] [address]     Thyroid disease         Past Surgical History  Past Surgical History:   Procedure Laterality Date    ME CYSTOURETHROSCOPY,URETER CATHETER Left 11/23/2018    Procedure: CYSTOSCOPY RETROGRADE PYELOGRAM WITH INSERTION STENT Jes Marquis OF STRICTURE;  Surgeon: Katelynn Paez MD;  Location: [facility];  Service: Urology         General Information:    81 yo female referred  for a VBS by Dr Angelica Jacobs for dysphagia to assess for changes in pt's swallow function since previous VBS given c/o increased symptoms of dysphagia and difficulty managing secretions  Pt is now s/p PEG tube placement on 10/14/19 and is interested in continuing some "pleasure" feedings of safe textures PO  Cognition:  WFL    Speech/Swallow Mech: Oral motor movements appeared decreased-chin significantly tremoring currently; Dentition was adequate; Cough was weak, nonproductive with audible secretions  Respiratory Status: RA;   Current diet: puree/HTL with TF  Prior VBS April 2019-Mild-moderate oral/moderate pharyngeal dysphagia w/ episodes of silent aspiration w/ thin liquids  Pt was seen in radiology for a Video Barium Swallow Study, seated in the upright position and viewed laterally with the following consistencies: puree, HTL via tsp, NTL via tsp, and 1/2 barium tablet whole in puree      Results are as follows:     **Images are available for review on PACS          Oral Stage:   Pt fed by SLP with decreased but functional retrieval from tsp given increased tremors in pt's chin compared to previous   Pt with functional oral control with all consistencies trialed, but with significantly slowed and prolonged a-p transfer with 1 significantly slowed a-p tongue movement  As tongue moves posteriorly there is some loss of bolus control seen with slight premature spill  Mastication not assessed at this time 2/2 increased weakness and known dysphagia with solids  Control and transfer of the 1/2 tablet in puree was adequate  Pharyngeal Stage:   Mild-mod delayed swallow initiation with NTL>puree/HTL with NTL falling to vallecula and laryngeal vestibule prior to initiation  Significantly decreased hyolaryngeal excursion/elevation with puree>HT/NT liquids with significantly decreased pharyngeal constriction  The majority of the puree bolus remained throughout the pharynx including vallecula, posterior pharyngeal wall and falling into the pyriform sinuses  Improved, but mild-mod decreased hyolaryngeal excursion/elevation and pharyngeal contraction with HT/NT liquids via tsp with only mild pharyngeal retention in pyriform sinuses and slight fall through the pharynx  Passive passive penetration seen with puree trials given amount of retention  There was a limited view 2/2 to shoulder, but no aspiration was visualized during the study  Esophageal Stage:   Screen completed  Small amounts of esophageal retention observed w/o evidence of retroflow  Assessment Summary:   Pt presents with increasing mod-severe oropharyngeal dysphagia with extremely slowed oral transfer of the bolus with mild posterior loss of control with NTL> HT/puree as the bolus is posteriorly moving towards pharynx  Pharyngeal swallow was severely weakened with puree, but only mild-mod weakened with HT/ NT liquids  Majority of the puree bolus remained in the pharynx after the swallow with passive penetration into the laryngeal vestibule  No penetration seen with thickened liquids         Discussed worsened clinical status with RN as pt displayed increased tremors and difficulty managing secretions prior and during the study  RN reported pt only received 2 instead of 2 5 of her sinemet dose 2/2 the  5 Dose not being available on the floor med cart  SLP discussed with RN that she believes this is negatively impacting the pt and dose and timing of the doses are critical and we should maintain schedule as much as possible  SLP will continue to follow pt           Recommendations:   -NPO with sips of HTL via tsp  -Peg TF as primary means of nutrition   -Meds via PEG as able or with puree as needed   -HOB at least 30* 2/2 aspiration risk   -Oral care q6h  -ST to follow to further assess pt for safety of pleasure feeds as sinemet doses become consistent with decreased symptoms of dysphagia (highly recommend rigid sinemet schedule 2/2 increased dysphagia if doses become off schedule)  -Continue VNA speech therapy at time of d/c

## 2019-10-15 NOTE — SOCIAL WORK
Met with pt's spouse who has decided to not have pt go to [facility] and prefers to take her home  Contacted [facility] to setup nursing and home therapy  Faxed over the tube feeding orders and rx for suction machine to [facility]  Pt is anticipated for discharge tomorrow  Will continue to follow-up

## 2019-10-15 NOTE — TELEPHONE ENCOUNTER
Is the plan for all her medications to go through the PEG from now on? There are a couple of options  One would be to switch over to IR and see how she does  We would probably try 2 tabs of sinemet IR 25/100 in place of the current dose which I understand is 2 tabs of Sinemet CR 25/100 + 1/2 tab of sinemet IR 25/100  The other option would be to try switching over to Rytary which is a capsule you can break open and pour into the PEG   That would take more time to set up with insurance etc      Will CC Shelly Becker as well who knows the patient

## 2019-10-15 NOTE — PROGRESS NOTES
[facility] [facility] Progress Note  Patient: Tarri Sandhoff 80 y o  female   MRN: [id number]  PCP: Lou Rothman  Unit/Bed#: [address] [place] [address] A Encounter: [id number]  Date Of Visit: 10/15/19    Problem List:    Principal Problem:    Aspiration pneumonia ([facility] [address] )  Active Problems:    Parkinson's disease ([facility] [address] )    Other dysphagia    Elevated blood pressure reading    Gait abnormality    Paroxysmal A-fib ([facility] [address] )    Hypothyroidism    Weight loss      Assessment & Plan:    * Aspiration pneumonia ([facility] [address] )  Assessment & Plan  Patient with history of parkinsonism with oropharyngeal dysphagia and possible esophageal dysmotility  Patient was scheduled for PEG tube placement on the day of admission but noted to have increasing cough shortness of breath and hypoxia, procedure was postponed  Chest x-ray with possibility of small bilateral infiltrate and effusion  Remains afebrile  Normal white count  Oxygen requirement is improving   Blood cultures negative so far  Urine Legionella pneumococcal antigen negative  Follow-up Speech and swallow evaluation-recommended VBS for safe PO diet for pleasure  Strict aspiration precaution  Repeat chest x-ray reports improvement  Procalcitonin has normalized  Discontinue antibiotics  Bronchodilators, acetylcysteine and chest PT  Pulmonary following, input appreciated    Recommended scopolamine patch      Elevated blood pressure reading  Assessment & Plan  Patient was noted to have elevated blood on presentation  Asymptomatic  Family reports that usually blood pressure on low, does not want addition of antihypertensive medication  Will observe at present  IV hydralazine as needed for blood pressure more than 180/110    Other dysphagia  Assessment & Plan  Secondary to parkinsonism  History of mild-to-moderate oropharyngeal dysphagia with silent aspiration  Status post PEG tube placement on 10/15, resume tube feeding  Transition patient's meds to PEG tube only  Nutrition is evaluation for goal tube feeding and bolus feeding at home  Continue aspiration precaution  Speech and swallow evaluation appreciated, recommended VBS for safe PO diet for pleasure  Discussed with  for tube feeding supplies at home    Parkinson's disease [facility])  Assessment & Plan  Resume home dose of Sinemet 4 times a day  Patient was on 2 tabs Sinemet CR and half tablet Sinemet 4 times a day  Contacted patient's neurologist office, recommended to transition to Sinemet 2 tablets regular release 4 times a day via PEG tube  Discontinue Sinemet CR for now  Follow up with Neurology as regarding approval for long-acting capsule form  Follow up with Neurology after discharge  PT/OT    Gait abnormality  Assessment & Plan  PT/OT    Weight loss  Assessment & Plan  Secondary to poor p o  Intake and dysphagia   Nutrition evaluation    Hypothyroidism  Assessment & Plan  TSH within normal limit  Continue thyroid supplement    Paroxysmal A-fib [facility])  Assessment & Plan  Not a candidate for anticoagulation  Patient was previously on aspirin 81 mg daily  Resume at discharge        Hypokalemia-resolved as of 10/13/2019  Assessment & Plan  Replete IV        VTE Pharmacologic Prophylaxis:   Pharmacologic: Enoxaparin (Lovenox)  Mechanical VTE Prophylaxis in Place: Yes    Patient Centered Rounds: I have performed bedside rounds with nursing staff today  Discussions with Specialists or Other Care Team Provider:  Yes, GI, pulmonary, nutrition, , speech therapist    Education and Discussions with Family / Patient:  Patient & Family    Time Spent for Care: 45 minutes  More than 50% of total time spent on counseling and coordination of care as described above      Current Length of Stay: 4 day(s)    Current Patient Status: Inpatient   Certification Statement: The patient will continue to require additional inpatient hospital stay due to Aspiration pneumonia, persistent secretion    Discharge Plan:  Home with 24 hour care likely in a m  Once arrangements are made    Code Status: Level 1 - Full Code      Subjective:   Underwent successful PEG placement yesterday, okay to resume tube feeding today as per GI   Remains afebrile with stable oxygenation  Continues to have morning upper respiratory secretion which patient has difficulty in managing requiring frequent suctioning to prevent aspiration      Objective:     Vitals:   Temp (24hrs), Av 3 °F (36 3 °C), Min:95 6 °F (35 3 °C), Max:97 9 °F (36 6 °C)    Temp:  [95 6 °F (35 3 °C)-97 9 °F (36 6 °C)] 97 6 °F (36 4 °C)  HR:  [58-79] 61  Resp:  [16-18] 17  BP: (141-205)/(64-81) 168/74  SpO2:  [92 %-100 %] 94 % on room air  Body mass index is 21 16 kg/m²  Input and Output Summary (last 24 hours): Intake/Output Summary (Last 24 hours) at 10/15/2019 1132  Last data filed at 10/15/2019 0604  Gross per 24 hour   Intake 500 ml   Output 500 ml   Net 0 ml       Physical Exam:     Physical Exam   Constitutional: She appears well-developed  No distress  Frail, chronically ill, cachectic   HENT:   Head: Normocephalic and atraumatic  Eyes: Pupils are equal, round, and reactive to light  Neck: Normal range of motion  Cardiovascular: Normal rate, regular rhythm and normal heart sounds  Pulmonary/Chest: Effort normal and breath sounds normal  No respiratory distress  She has no wheezes  She has no rales  Diminished   Abdominal: Soft  Bowel sounds are normal  She exhibits no distension  There is no tenderness  There is no rebound and no guarding  PEG tube in place   Musculoskeletal: She exhibits no edema  Neurological: She is alert  No cranial nerve deficit  At baseline   Skin: Skin is warm and dry  No rash noted         Additional Data:     Labs:    Results from last 7 days   Lab Units 10/12/19  0548   WBC Thousand/uL 5 40   HEMOGLOBIN g/dL 12 6   HEMATOCRIT % 39 3   PLATELETS Thousands/uL 348   NEUTROS PCT % 71   LYMPHS PCT % 21   MONOS PCT % 7   EOS PCT % 1     Results from last 7 days   Lab Units 10/15/19  0521  10/11/19  1405   POTASSIUM mmol/L 3 8   < > 4 0   CHLORIDE mmol/L 102   < > 104   CO2 mmol/L 25   < > 31   BUN mg/dL 8   < > 14   CREATININE mg/dL 0 64   < > 0 81   CALCIUM mg/dL 8 8   < > 9 5   ALK PHOS U/L  --   --  101   ALT U/L  --   --  <6*   AST U/L  --   --  34    < > = values in this interval not displayed  Results from last 7 days   Lab Units 10/11/19  1405   INR  1 04       * I Have Reviewed All Lab Data Listed Above  * Additional Pertinent Lab Tests Reviewed: All Labs Within Last 24 Hours Reviewed      Imaging:  Imaging Reports Reviewed Today Include:  Chest x-ray    Recent Cultures (last 7 days):     Results from last 7 days   Lab Units 10/11/19  1814 10/11/19  1416 10/11/19  1405   BLOOD CULTURE   --  No Growth at 72 hrs  No Growth at 72 hrs     LEGIONELLA URINARY ANTIGEN  Negative  --   --        Last 24 Hours Medication List:     Current Facility-Administered Medications:  acetaminophen 650 mg Per PEG Tube Q6H PRN Jeffy Ferro MD    acetylcysteine 3 mL Nebulization Q6H PRN Nat Mackenzie MD    azelastine 1 spray Each Nare BID Arturo Morton MD    bisacodyl 10 mg Rectal Daily PRN Arturo Morton MD    carbidopa-levodopa 2 tablet Oral 4x Daily Arturo Morton MD    carbidopa-levodopa 0 5 tablet Per PEG Tube 4x Daily Jeffy Ferro MD    cefTRIAXone 1,000 mg Intravenous Q24H Arturo Morton MD Last Rate: 1,000 mg (10/14/19 2235)   dextran 70-hypromellose 1 drop Both Eyes Q3H PRN Arturo Morton MD    dextromethorphan-guaiFENesin 5 mL Per PEG Tube Q4H PRN Jeffy Ferro MD    famotidine 20 mg Intravenous Q24H Edwige López MD    hydrALAZINE 5 mg Intravenous Q6H PRN Arturo Morton MD    ipratropium-albuterol 3 mL Nebulization TID Arturo Morton MD    lactobacillus acidophilus-bulgaricus 1 packet Per PEG Tube TID With Meals Jeffy Ferro MD    melatonin 3 mg Per PEG Tube HS Jeffy Ferro MD    metroNIDAZOLE 500 mg Intravenous Mariea Kingdom, MD Last Rate: 500 mg (10/15/19 7744)   [START ON 10/16/2019] multivitamin with iron-minerals 15 mL Per PEG Tube Daily Bettina Fitzgerald MD    ondansetron 4 mg Intravenous Q6H PRN Carlos Lamar MD    [START ON 10/16/2019] thyroid 15 mg Per G Tube Daily Bettina Fitzgerald MD           Today, Patient Was Seen By: Bettina Fitzgerald MD    ** Please Note: "This note has been constructed using a voice recognition system  Therefore there may be syntax, spelling, and/or grammatical errors   Please call if you have any questions  "**

## 2019-10-15 NOTE — PLAN OF CARE
Problem: Nutrition/Hydration-ADULT  Goal: Nutrient/Hydration intake appropriate for improving, restoring or maintaining nutritional needs  Description  Monitor and assess patient's nutrition/hydration status for malnutrition  Collaborate with interdisciplinary team and initiate plan and interventions as ordered  Monitor patient's weight and dietary intake as ordered or per policy  Utilize nutrition screening tool and intervene as necessary  Determine patient's food preferences and provide high-protein, high-caloric foods as appropriate       INTERVENTIONS:  - Monitor oral intake, urinary output, labs and treatment plans  - Assess nutrition and hydration status and recommend course of action  - Evaluate the amount of meals eaten  - Assist patient with eating if necessary   - Allow adequate time for meals  - Recommend/ encourage appropriate diets, oral nutritional supplements, and vitamin/mineral supplements  - Order, calculate, and assess calorie counts as needed  - Recommend, monitor and adjust tube feedings based on assessed needs  - Provide specific nutrition/hydration education as appropriate  - Include patient/family/caregiver in decisions related to nutrition    Outcome: Progressing     Problem: RESPIRATORY - ADULT  Goal: Achieves optimal ventilation and oxygenation  Description  INTERVENTIONS:  - Assess for changes in respiratory status  - Assess for changes in mentation and behavior  - Position to facilitate oxygenation and minimize respiratory effort  - Oxygen administered by appropriate delivery if ordered  - Assess the need for suctioning and aspirate as needed  - Respiratory Therapy support as indicated  - Nebulizer    Outcome: Progressing     Problem: Prexisting or High Potential for Compromised Skin Integrity  Goal: Skin integrity is maintained or improved  Description  INTERVENTIONS:  - Assess and monitor skin integrity  - Assess and monitor nutrition and hydration status  - Monitor labs   - Assess for incontinence   - Turn and reposition patient  - Assist with mobility/ambulation  - Relieve pressure over bony prominences  - Avoid friction and shearing  - Evaluate need for skin moisturizer/barrier cream     Outcome: Progressing     Problem: Potential for Falls  Goal: Patient will remain free of falls  Description  INTERVENTIONS:  - Assess patient frequently for physical needs  -  Identify cognitive and physical deficits and behaviors that affect risk of falls    -  [place] fall precautions as indicated by assessment   - Educate patient/family on patient safety including physical limitations  - Instruct patient to call for assistance with activity based on assessment  - Modify environment to reduce risk of injury  - Consider OT/PT consult to assist with strengthening/mobility  Outcome: Progressing

## 2019-10-15 NOTE — TELEPHONE ENCOUNTER
Angelia Nguyen from [facility] called and states that pt got her peg tube placed yesterday 10/14/19  Their concern is that pt is having a hard time swallowing sinemet CR  Since sinemet cr cannot be crushed, asking what Dr Cook recommendation   Pt will be ready for discharge possibly tmrw at the earliest      Pls advise        [phone] can leave detailed message

## 2019-10-15 NOTE — PROGRESS NOTES
Progress Note - Meri Rubin 80 y o  female MRN: [id number]    Unit/Bed#: 2 [address] Encounter: [id number]        Subjective:   Patient on nebulizer treatment at this time, denies any abdominal pain or nausea  Objective:     Vitals: Blood pressure 168/74, pulse 61, temperature 97 6 °F (36 4 °C), resp  rate 17, height 5' 1" (1 549 m), weight 50 8 kg (112 lb), SpO2 94 %  ,Body mass index is 21 16 kg/m²  Intake/Output Summary (Last 24 hours) at 10/15/2019 1208  Last data filed at 10/15/2019 0604  Gross per 24 hour   Intake 500 ml   Output 500 ml   Net 0 ml       Physical Exam:   General appearance: alert, on nebulizer, appears stated age and cooperative  Lungs: clear to auscultation bilaterally, no labored breathing/accessory muscle use  Heart: regular rate and rhythm, S1, S2 normal, no murmur, click, rub or gallop  Abdomen: soft, peg tube noted to be in place underneath binder, no evidence of any leakage or bleeding at this time, non-tender; bowel sounds normal; no masses,  no organomegaly  Extremities: no edema    Invasive Devices     Peripheral Intravenous Line            Peripheral IV 10/15/19 Right Forearm less than 1 day          Drain            External Urinary Catheter 2 days    Gastrostomy/Enterostomy Percutaneous endoscopic gastrostomy (PEG) 20 Fr  RUQ less than 1 day                Lab, Imaging and other studies: I have personally reviewed pertinent reports  Admission on 10/11/2019   Component Date Value    MRSA Culture Only 10/11/2019 No Methicillin Resistant Staphlyococcus aureus (MRSA) isolated     Platelets 71/62/0999 387     MPV 10/11/2019 10 5     Procalcitonin 10/11/2019 0 06     Blood Culture 10/11/2019 No Growth at 72 hrs   Blood Culture 10/11/2019 No Growth at 72 hrs       Strep pneumoniae antigen* 10/11/2019 Negative     Legionella Urinary Antig* 10/11/2019 Negative     Color, UA 10/11/2019 Yellow     Clarity, UA 10/11/2019 Clear     Specific [place], UA 10/11/2019 1 025     pH, UA 10/11/2019 6 0     Leukocytes, UA 10/11/2019 Negative     Nitrite, UA 10/11/2019 Negative     Protein, UA 10/11/2019 Trace*    Glucose, UA 10/11/2019 Negative     Ketones, UA 10/11/2019 15 (1+)*    Urobilinogen, UA 10/11/2019 0 2     Bilirubin, UA 10/11/2019 Negative     Blood, UA 10/11/2019 Negative     WBC 10/11/2019 5 42     RBC 10/11/2019 4 70     Hemoglobin 10/11/2019 14 1     Hematocrit 10/11/2019 44 4     MCV 10/11/2019 95     MCH 10/11/2019 30 0     MCHC 10/11/2019 31 8     RDW 10/11/2019 13 9     MPV 10/11/2019 10 5     Platelets 46/26/1603 422*    nRBC 10/11/2019 0     Neutrophils Relative 10/11/2019 67     Immat GRANS % 10/11/2019 0     Lymphocytes Relative 10/11/2019 27     Monocytes Relative 10/11/2019 4     Eosinophils Relative 10/11/2019 2     Basophils Relative 10/11/2019 0     Neutrophils Absolute 10/11/2019 3 59     Immature Grans Absolute 10/11/2019 0 02     Lymphocytes Absolute 10/11/2019 1 47     Monocytes Absolute 10/11/2019 0 23     Eosinophils Absolute 10/11/2019 0 09     Basophils Absolute 10/11/2019 0 02     Sodium 10/11/2019 140     Potassium 10/11/2019 4 0     Chloride 10/11/2019 104     CO2 10/11/2019 31     ANION GAP 10/11/2019 5     BUN 10/11/2019 14     Creatinine 10/11/2019 0 81     Glucose 10/11/2019 90     Calcium 10/11/2019 9 5     AST 10/11/2019 34     ALT 10/11/2019 <6*    Alkaline Phosphatase 10/11/2019 101     Total Protein 10/11/2019 7 6     Albumin 10/11/2019 3 1*    Total Bilirubin 10/11/2019 0 40     eGFR 10/11/2019 66     Protime 10/11/2019 11 1     INR 10/11/2019 1 04     PTT 10/11/2019 29     Magnesium 10/11/2019 2 4     Phosphorus 10/11/2019 3 2     LACTIC ACID 10/11/2019 0 7     RBC, UA 10/11/2019 None Seen     WBC, UA 10/11/2019 10-20*    Epithelial Cells 10/11/2019 Occasional     Bacteria, UA 10/11/2019 Innumerable*    WBC 10/12/2019 5 40     RBC 10/12/2019 4 19     Hemoglobin 10/12/2019 12 6     Hematocrit 10/12/2019 39 3     MCV 10/12/2019 94     MCH 10/12/2019 30 1     MCHC 10/12/2019 32 1     RDW 10/12/2019 13 7     MPV 10/12/2019 10 4     Platelets 91/93/0289 348     nRBC 10/12/2019 0     Neutrophils Relative 10/12/2019 71     Immat GRANS % 10/12/2019 0     Lymphocytes Relative 10/12/2019 21     Monocytes Relative 10/12/2019 7     Eosinophils Relative 10/12/2019 1     Basophils Relative 10/12/2019 0     Neutrophils Absolute 10/12/2019 3 82     Immature Grans Absolute 10/12/2019 0 01     Lymphocytes Absolute 10/12/2019 1 13     Monocytes Absolute 10/12/2019 0 38     Eosinophils Absolute 10/12/2019 0 04     Basophils Absolute 10/12/2019 0 02     Sodium 10/12/2019 140     Potassium 10/12/2019 3 4*    Chloride 10/12/2019 105     CO2 10/12/2019 28     ANION GAP 10/12/2019 7     BUN 10/12/2019 12     Creatinine 10/12/2019 0 70     Glucose 10/12/2019 91     Calcium 10/12/2019 8 6     eGFR 10/12/2019 79     Sodium 10/13/2019 141     Potassium 10/13/2019 4 2     Chloride 10/13/2019 105     CO2 10/13/2019 28     ANION GAP 10/13/2019 8     BUN 10/13/2019 11     Creatinine 10/13/2019 0 66     Glucose 10/13/2019 94     Calcium 10/13/2019 9 0     eGFR 10/13/2019 80     Sodium 10/15/2019 138     Potassium 10/15/2019 3 8     Chloride 10/15/2019 102     CO2 10/15/2019 25     ANION GAP 10/15/2019 11     BUN 10/15/2019 8     Creatinine 10/15/2019 0 64     Glucose 10/15/2019 78     Calcium 10/15/2019 8 8     eGFR 10/15/2019 81        Results for Rosenda Decker (MRN [id number]) as of 10/15/2019 12:08   Ref   Range 10/12/2019 05:48 10/13/2019 05:28 10/14/2019 08:36 10/14/2019 16:24 10/15/2019 05:21   Sodium Latest Ref Range: 136 - 145 mmol/L 140 141   138   Potassium Latest Ref Range: 3 5 - 5 3 mmol/L 3 4 (L) 4 2   3 8   Chloride Latest Ref Range: 100 - 108 mmol/L 105 105   102   CO2 Latest Ref Range: 21 - 32 mmol/L 28 28   25   Anion Gap Latest Ref Range: 4 - 13 mmol/L 7 8   11   BUN Latest Ref Range: 5 - 25 mg/dL 12 11   8   Creatinine Latest Ref Range: 0 60 - 1 30 mg/dL 0 70 0 66   0 64   Glucose, Random Latest Ref Range: 65 - 140 mg/dL 91 94   78   Calcium Latest Ref Range: 8 3 - 10 1 mg/dL 8 6 9 0   8 8   eGFR Latest Units: ml/min/1 73sq m 79 80   81   WBC Latest Ref Range: 4 31 - 10 16 Thousand/uL 5 40       Red Blood Cell Count Latest Ref Range: 3 81 - 5 12 Million/uL 4 19       Hemoglobin Latest Ref Range: 11 5 - 15 4 g/dL 12 6       HCT Latest Ref Range: 34 8 - 46 1 % 39 3       MCV Latest Ref Range: 82 - 98 fL 94       MCH Latest Ref Range: 26 8 - 34 3 pg 30 1       MCHC Latest Ref Range: 31 4 - 37 4 g/dL 32 1       RDW Latest Ref Range: 11 6 - 15 1 % 13 7       Platelet Count Latest Ref Range: 149 - 390 Thousands/uL 348       MPV Latest Ref Range: 8 9 - 12 7 fL 10 4       nRBC Latest Units: /100 WBCs 0       Neutrophils % Latest Ref Range: 43 - 75 % 71       Immat GRANS % Latest Ref Range: 0 - 2 % 0       Lymphocytes Relative Latest Ref Range: 14 - 44 % 21       Monocytes Relative Latest Ref Range: 4 - 12 % 7       Eosinophils Latest Ref Range: 0 - 6 % 1       Basophils Relative Latest Ref Range: 0 - 1 % 0       Immature Grans Absolute Latest Ref Range: 0 00 - 0 20 Thousand/uL 0 01       Absolute Neutrophils Latest Ref Range: 1 85 - 7 62 Thousands/µL 3 82       Lymphocytes Absolute Latest Ref Range: 0 60 - 4 47 Thousands/µL 1 13       Absolute Monocytes Latest Ref Range: 0 17 - 1 22 Thousand/µL 0 38       Absolute Eosinophils Latest Ref Range: 0 00 - 0 61 Thousand/µL 0 04       Basophils Absolute Latest Ref Range: 0 00 - 0 10 Thousands/µL 0 02       EGD Unknown    Attch    XR CHEST PORTABLE Unknown   Rpt         Assessment/Plan:    1   Oropharyngeal dysphagia in the setting of Parkinson's disease and recurrent aspiration pneumonia; patient had PEG tube placed yesterday which appears to be in place, no complications noted during procedure    - I discussed patient's case and plan with Dr Valdes [facility])  - may start tube feedings at this time, start 20 cc/hour continuous Jevity 1 2 and gradually advance to goal rate as determined by nutrition  - water/saline flushes of PEG tube every 4 hours  - dressing changes twice daily  - speech follow-up, can still do bedside evaluations to determine what if any oral diet patient may be able to safely tolerate

## 2019-10-15 NOTE — PROGRESS NOTES
Progress Note - Pulmonary   Honora Jun 80 y o  female MRN: [id number]  Unit/Bed#: 2 [place] [address] [address] Encounter: [id number]      Assessment/Plan:     Acute Respiratory Insufficiency:  -Resolved    Aspiration Pneuonitis / Pneumonia:  -procalcitonin remains negative w/ normal fever curve and WBC count  -d/c antibiotics at this time  -most likely induced pneumonitis  -has ongoing risk for aspiration of secretions and recurrent pneumonia  -consider scopolamine patch for secretion management  -VBS tomorrow for evaluation for swallowing for pills and pleasure feeds    S/P PEG:  -POD # 1  -enteral feeds have begun    Parkinsons Disease:  -advanced  -contributor to ongoing aspiration        ______________________________________________________________________    Subjective: Pt seen and examined at bedside  No complaints  Keeps eyes closed during conversation  Has some mild pain at PEG site  No c/o cough today  Tele Events:     Vitals:   Temp:  [95 6 °F (35 3 °C)-97 9 °F (36 6 °C)] 97 6 °F (36 4 °C)  HR:  [58-79] 61  Resp:  [16-18] 17  BP: (141-205)/(64-81) 168/74  Weight (last 2 days)     None        97% on RA    IV Infusions:       Nutrition:        Diet Orders   (From admission, onward)             Start     Ordered    10/15/19 1130  Diet Enteral/Parenteral; Tube Feeding with Oral Diet; Jevity 1 2 [place]; Continuous; 20; 100; Every 4 hours; Dysphagia/Modified Consistency; Dysphagia 1-Pureed; Honey Thick Liquid  Diet effective now     Question Answer Comment   Diet Type Enteral/Parenteral    Enteral/Parenteral Tube Feeding with Oral Diet    Tube Feeding Formula: Jevity 1 2 Douglas    Bolus/Cyclic/Continuous Continuous    Tube Feeding Goal Rate (mL/hr): 20    Tube Feeding water flush (mL): 100    Water flush frequency: Every 4 hours    Diet Type Dysphagia/Modified Consistency    Dysphagia/Modified Consistency Dysphagia 1-Pureed    Liquid Modifier Honey Thick Liquid    RD to adjust diet per protocol?  No        10/15/19 1129    10/13/19 1718  Dietary nutrition supplements  Once     Question Answer Comment   Select Supplement: Ensure Enlive-Strawberry    Select Supplement: Ensure Enlive-Chocolate    Select Supplement: Ensure Enlive-Vanilla    Frequency Breakfast, Dinner        10/13/19 1718    10/11/19 1942  Room Service  Once     Question:  Type of Service  Answer:  Room Service - Appropriate with Assistance    10/11/19 1941                Ins/Outs:   I/O       10/13 0701 - 10/14 0700 10/14 0701 - 10/15 0700 10/15 0701 - 10/16 0700    P  O  300      I V  (mL/kg)  500 (9 8)     Total Intake(mL/kg) 300 (5 9) 500 (9 8)     Urine (mL/kg/hr) 1000 (0 8) 500 (0 4)     Total Output 1000 500     Net -700 0            Unmeasured Urine Occurrence 1 x            Lines/Drains:  Invasive Devices     Peripheral Intravenous Line            Peripheral IV 10/15/19 Right Forearm less than 1 day          Drain            External Urinary Catheter 2 days    Gastrostomy/Enterostomy Percutaneous endoscopic gastrostomy (PEG) 20 Fr  RUQ less than 1 day                 Active medications:  Scheduled Meds:  Current Facility-Administered Medications:  acetaminophen 650 mg Per PEG Tube Q6H PRN Bambi Babinski, MD    acetylcysteine 3 mL Nebulization Q6H PRN Brice Levy MD    azelastine 1 spray Each Nare BID Tevin Lau MD    bisacodyl 10 mg Rectal Daily PRN Tevin Lau MD    carbidopa-levodopa 2 tablet Per PEG Tube 4x Daily Bambi Babinski, MD    carbidopa-levodopa 0 5 tablet Per PEG Tube Once Bambi Babinski, MD    cefTRIAXone 1,000 mg Intravenous Q24H Tevin Lau MD Last Rate: 1,000 mg (10/14/19 2235)   dextran 70-hypromellose 1 drop Both Eyes Q3H PRN Tevin Lau MD    dextromethorphan-guaiFENesin 5 mL Per PEG Tube Q4H PRN Bambi Babinski, MD    famotidine 20 mg Intravenous Q24H [facility] Bambi Babinski, MD    hydrALAZINE 5 mg Intravenous Q6H PRN Tevin Lau MD    ipratropium-albuterol 3 mL Nebulization TID Tevin Lau MD    lactobacillus acidophilus-bulgaricus 1 packet Per PEG Tube TID With Meals Pietro Barnard MD    melatonin 3 mg Per PEG Tube HS Pietro Barnard MD    metroNIDAZOLE 500 mg Intravenous Q8H Oz Segovia MD Last Rate: 500 mg (10/15/19 0514)   [START ON 10/16/2019] multivitamin with iron-minerals 15 mL Per PEG Tube Daily Pietro Barnard MD    ondansetron 4 mg Intravenous Q6H PRN Oz Segovia MD    [START ON 10/16/2019] thyroid 15 mg Per G Tube Daily Pietro Barnard MD      PRN Meds:  acetaminophen 650 mg Q6H PRN   acetylcysteine 3 mL Q6H PRN   bisacodyl 10 mg Daily PRN   dextran 70-hypromellose 1 drop Q3H PRN   dextromethorphan-guaiFENesin 5 mL Q4H PRN   hydrALAZINE 5 mg Q6H PRN   ondansetron 4 mg Q6H PRN     ____________________________________________________________________      Physical Exam   Constitutional: She appears well-developed  Elderly and frail appearing   HENT:   Head: Normocephalic and atraumatic  Eyes: Pupils are equal, round, and reactive to light  Conjunctivae are normal    Neck: Normal range of motion  Neck supple  Cardiovascular: Normal rate, regular rhythm and normal heart sounds  Pulmonary/Chest: Effort normal  No respiratory distress  She has decreased breath sounds in the right upper field, the right middle field, the right lower field, the left upper field, the left middle field and the left lower field  She has no wheezes  She has no rales  She exhibits no tenderness  Diffuse mild to moderately decreased breath sounds    97% on RA        Abdominal: Soft  Bowel sounds are normal    + PEG tube w/o s/s of infection   Musculoskeletal: Normal range of motion  She exhibits no edema  Neurological: She is alert  She displays tremor     Keeps eyes closed while talking    Has mouth tremor   Skin: Skin is warm and dry            ____________________________________________________________________    Labs:   CBC: Results from last 7 days   Lab Units 10/12/19  0548 10/11/19  1416 10/11/19  1405   WBC Thousand/uL 5 40  --  5 42   HEMOGLOBIN g/dL 12 6  --  14 1   HEMATOCRIT % 39 3  --  44 4   MCV fL 94  --  95   PLATELETS Thousands/uL 348 387 422*     CMP: Results from last 7 days   Lab Units 10/15/19  0521 10/13/19  0528 10/12/19  0548 10/11/19  1405   POTASSIUM mmol/L 3 8 4 2 3 4* 4 0   CHLORIDE mmol/L 102 105 105 104   CO2 mmol/L 25 28 28 31   BUN mg/dL 8 11 12 14   CREATININE mg/dL 0 64 0 66 0 70 0 81   CALCIUM mg/dL 8 8 9 0 8 6 9 5   AST U/L  --   --   --  34   ALT U/L  --   --   --  <6*   ALK PHOS U/L  --   --   --  101   EGFR ml/min/1 73sq m 81 80 79 66     No components found for: ABG    Magnesium:   Results from last 7 days   Lab Units 10/11/19  1405   MAGNESIUM mg/dL 2 4     Phosphorous:   Results from last 7 days   Lab Units 10/11/19  1405   PHOSPHORUS mg/dL 3 2     Troponin:     PT/INR:   Results from last 7 days   Lab Units 10/11/19  1405   PTT seconds 29   INR  1 04     Lactic Acid:   Results from last 7 days   Lab Units 10/11/19  1405   LACTIC ACID mmol/L 0 7     BNP:     TSH:     Procalcitonin: Invalid input(s): PROCALCITONIN      Imaging:   XR chest portable   Final Result by Eleuterio Mitchell MD (10/14 1044)      Improved right lung base aeration  Subsegmental left lung base atelectasis  Workstation performed: [id number]         FL barium swallow video w speech    (Results Pending)         Micro: Lab Results   Component Value Date    BLOODCX No Growth at 72 hrs  10/11/2019    BLOODCX No Growth at 72 hrs  10/11/2019    BLOODCX No Growth After 5 Days   10/01/2019    URINECX No Growth <1000 cfu/mL 10/01/2019    URINECX >100,000 cfu/ml Escherichia coli (A) 04/17/2019    URINECX (A) 02/14/2019     60,000-69,000 cfu/ml Staphylococcus coagulase negative    URINECX 20,000-29,000 cfu/ml  02/14/2019    MRSACULTURE  10/11/2019     No Methicillin Resistant Staphlyococcus aureus (MRSA) isolated            Invalid input(s): LEGIONELLAURINARYANTIGEN        Code Status: Level 1 - Full Code

## 2019-10-15 NOTE — PROGRESS NOTES
Recommend Jevity 1 2 at goal rate of 40 mL/hour for a total volume of 960 mL  This will provide 1152 kcals, 53 gram protein and 775 mL free water  Recommend flushes be decreased to 100 mL q 6 hours  Pt is scheduled to go home and request made for bolus feedings  Recommend Jevity 1 2 at 1 can (8 oz, 240 mL) 4 times per day for a total volume of 960 mL  This will provide 1152 kcals, 53 grams protein, 775 mL free water  Recommend flushes of 60 mL of water before and after each tube feeding bolus

## 2019-10-16 PROBLEM — G93.41 ACUTE METABOLIC ENCEPHALOPATHY: Status: RESOLVED | Noted: 2017-02-25 | Resolved: 2019-01-01

## 2019-10-16 PROBLEM — R53.83 LETHARGY: Status: RESOLVED | Noted: 2017-02-23 | Resolved: 2019-01-01

## 2019-10-16 PROBLEM — J10.1 INFLUENZA B: Status: RESOLVED | Noted: 2017-02-24 | Resolved: 2019-01-01

## 2019-10-16 PROBLEM — E87.1 HYPONATREMIA: Status: RESOLVED | Noted: 2017-02-23 | Resolved: 2019-01-01

## 2019-10-16 PROBLEM — N10 ACUTE PYELONEPHRITIS: Status: RESOLVED | Noted: 2018-01-01 | Resolved: 2019-01-01

## 2019-10-16 NOTE — PLAN OF CARE
Problem: Nutrition/Hydration-ADULT  Goal: Nutrient/Hydration intake appropriate for improving, restoring or maintaining nutritional needs  Description  Monitor and assess patient's nutrition/hydration status for malnutrition  Collaborate with interdisciplinary team and initiate plan and interventions as ordered  Monitor patient's weight and dietary intake as ordered or per policy  Utilize nutrition screening tool and intervene as necessary  Determine patient's food preferences and provide high-protein, high-caloric foods as appropriate       INTERVENTIONS:  - Monitor oral intake, urinary output, labs and treatment plans  - Assess nutrition and hydration status and recommend course of action  - Evaluate the amount of meals eaten  - Assist patient with eating if necessary   - Allow adequate time for meals  - Recommend/ encourage appropriate diets, oral nutritional supplements, and vitamin/mineral supplements  - Order, calculate, and assess calorie counts as needed  - Recommend, monitor and adjust tube feedings based on assessed needs  - Provide specific nutrition/hydration education as appropriate  - Include patient/family/caregiver in decisions related to nutrition    Outcome: Progressing     Problem: RESPIRATORY - ADULT  Goal: Achieves optimal ventilation and oxygenation  Description  INTERVENTIONS:  - Assess for changes in respiratory status  - Assess for changes in mentation and behavior  - Position to facilitate oxygenation and minimize respiratory effort  - Oxygen administered by appropriate delivery if ordered  - Assess the need for suctioning and aspirate as needed  - Respiratory Therapy support as indicated  - Nebulizer    Outcome: Progressing     Problem: Prexisting or High Potential for Compromised Skin Integrity  Goal: Skin integrity is maintained or improved  Description  INTERVENTIONS:  - Assess and monitor skin integrity  - Assess and monitor nutrition and hydration status  - Monitor labs   - Assess for incontinence   - Turn and reposition patient  - Assist with mobility/ambulation  - Relieve pressure over bony prominences  - Avoid friction and shearing  - Evaluate need for skin moisturizer/barrier cream     Outcome: Progressing     Problem: Potential for Falls  Goal: Patient will remain free of falls  Description  INTERVENTIONS:  - Assess patient frequently for physical needs  -  Identify cognitive and physical deficits and behaviors that affect risk of falls    -  [place] fall precautions as indicated by assessment   - Educate patient/family on patient safety including physical limitations  - Instruct patient to call for assistance with activity based on assessment  - Modify environment to reduce risk of injury  - Consider OT/PT consult to assist with strengthening/mobility  Outcome: Progressing

## 2019-10-16 NOTE — OCCUPATIONAL THERAPY NOTE
OT TREATMENT       10/16/19 1150   Restrictions/Precautions   Other Precautions Chair Alarm; Bed Alarm; Fall Risk;Cognitive   Pain Assessment   Pain Assessment No/denies pain   ADL   Toileting Assistance  2  Maximal Assistance   Toileting Deficit Perineal hygiene   Toileting Comments urination on toilet, max assist for hygiene and max assist of 2 for transfers    Transfers   Sit to Stand   (mod/max assist )   Additional items Assist x 2;Verbal cues   Stand to Sit   (mod/max assist )   Additional items Assist x 2;Verbal cues   Toilet transfer 2  Maximal assistance   Additional items Assist x 2;Verbal cues   Functional Mobility   Functional Mobility   (mod/max assist of 2)   Additional Comments assist of 2, 15 feet to bathroom    Additional items Rolling walker   Assessment   Assessment Patient fatigues with activity  Verbal cues for safety  Max assist for toileting  Patient would benefit from STR  Plan   Treatment Interventions ADL retraining; Activityengagement; Compensatory technique education;Cognitive reorientation;Patient/family training; Endurance training   Recommendation   OT Discharge Recommendation [employer] [employer] License Number  [facility] OTR/L [id number]

## 2019-10-16 NOTE — TELEPHONE ENCOUNTER
Called SL Leda Grijalva spoke w/ [place] and advised of all of the below  States that the attending physician already talked to someone yesterday re: below  Called [phone] and left a message on pt 's answering machine for a call back  Clinical team: I didn't leave a detailed message (too many info)  When pt calls back, pls advised of all of the below

## 2019-10-16 NOTE — DISCHARGE INSTRUCTIONS
· We Recommend against using any retail tube feeding other liquid nutritional supplements not prescribed as they may clog the PEG tube  · Tube feeding provides all necessary nutrients and supplements, therefore you do not require any additional supplements  · Talk to your doctor before starting any new medications through the PEG tube    · UR okay to have sips of honey thickened liquids via a teaspoon for pleasure, no other oral diet is recommended

## 2019-10-16 NOTE — TRANSPORTATION MEDICAL NECESSITY
Section I - General Information    Name of Patient: Fiona Montoya                 : 1933    Medicare #: [id number]  Transport Date: 10/16/19 (PCS is valid for round trips on this date and for all repetitive trips in the 60-day range as noted below )  Origin: [address]: [facility]  Is the pt's stay covered under Medicare Part A (PPS/DRG)   [x]     Closest appropriate facility? If no, why is transport to more distant facility required? Yes  If hospice pt, is this transport related to pt's terminal illness? NA       Section II - Medical Necessity Questionnaire  Ambulance transportation is medically necessary only if other means of transport are contraindicated or would be potentially harmful to the patient  To meet this requirement, the patient must either be "bed confined" or suffer from a condition such that transport by means other than ambulance is contraindicated by the patient's condition  The following questions must be answered by the medical professional signing below for this form to be valid:    1)  Describe the MEDICAL CONDITION (physical and/or mental) of this patient AT [address] [place] [address] that requires the patient to be transported in an ambulance and why transport by other means is contraindicated by the patient's condition: Pt has a diagnosis of Parkinsonism with gait abnormality  parkinsonism with oropharyngeal dysphagia and possible esophageal dysmotility  Dysphagia secondary to parkinson  New peg tube placed  Aspiration Pneumonia  Pt is confused and mas assist with transfers and very weak and deconditioned  Fall Risk  2) Is the patient "bed confined" as defined below?      Yes  To be "be confined" the patient must satisfy all three of the following conditions: (1) unable to get up from bed without Assistance; AND (2) unable to ambulate; AND (3) unable to sit in a chair or wheelchair  3) Can this patient safely be transported by car or wheelchair van (i e , seated during transport without a medical attendant or monitoring)? No    4) In addition to completing questions 1-3 above, please check any of the following conditions that apply*:   *Note: supporting documentation for any boxes checked must be maintained in the patient's medical records  If hosp-hosp transfer, describe services needed at 2nd facility not available at 1st facility? Patient is confused  Medical attendant required   Unable to tolerate seated position for time needed to transport       Section III - Signature of Physician or Healthcare Professional  I certify that the above information is true and correct based on my evaluation of this patient, and represent that the patient requires transport by ambulance and that other forms of transport are contraindicated  I understand that this information will be used by the Centers for Medicare and Medicaid Services (CMS) to support the determination of medical necessity for ambulance services, and I represent that I have personal knowledge of the patient's condition at time of transport  [x]  If this box is checked, I also certify that the patient is physically or mentally incapable of signing the ambulance service's claim and that the institution with which I am affiliated has furnished care, services, or assistance to the patient  My signature below is made on behalf of the patient pursuant to 42 CFR §424 36(b)(4)  In accordance with 42 CFR §424 37, the specific reason(s) that the patient is physically or mentally incapable of signing the claim form is as follows:  confusion        Signature of Physician* or Healthcare Professional______________________________________________________________  Signature Date 10/16/19 (For scheduled repetitive transports, this form is not valid for transports performed more than 60 days after this date)    Printed Name & Credentials of Physician or Healthcare Professional (MD, DO, RN, etc )_Iliana Souza_______________________________  *Form must be signed by patient's attending physician for scheduled, repetitive transports   For non-repetitive, unscheduled ambulance transports, if unable to obtain the signature of the attending physician, any of the following may sign (choose appropriate option below)  [] Physician Assistant []  Clinical Nurse Specialist []  Registered Nurse  []  Nurse Practitioner  [x] Discharge Planner

## 2019-10-16 NOTE — PHYSICAL THERAPY NOTE
PT TREATMENT     10/16/19 4015   Pain Assessment   Pain Assessment Hitchcock-Baker FACES   Hitchcock-Baker FACES Pain Rating 4  (feeding tube site at times with movement)   Restrictions/Precautions   Other Precautions Chair Alarm; Bed Alarm; Fall Risk;Cognitive   General   Chart Reviewed Yes   Family/Caregiver Present Yes   Cognition   Arousal/Participation Cooperative   Subjective   Subjective patient confused but cooperative and noting pain in feeding tube site at times   Bed Mobility   Supine to Sit 2  Maximal assistance   Additional items Assist x 1;Verbal cues;LE management   Transfers   Sit to Stand 2  Maximal assistance   Additional items Assist x 1;Verbal cues   Stand to Sit 2  Maximal assistance   Additional items Assist x 1;Verbal cues   Ambulation/Elevation   Gait Assistance   (mod to max assist )   Additional items Assist x 1;Verbal cues; Tactile cues   Assistive Device Rolling walker   Distance 5 feet x 1, 15 feet x 1 with max assist for weight shifting to advance LEs and max assist for walker guidance  Patient also fatigued with 15 feet walk to toilet and required max assist of 2 for transfer from toilet to chair for return to bedside   Exercises   Balance training  sit to and from stand x 3 for strengthening and balance, sitting balance activity also completed with LE exercise and reaching/weight shifting   Assessment   Assessment Patient cooperative and alert and following simple commands with increased time and repetition  Patient will benefit from continued PT with progression as tolerated   and aide present for activity and HHA completed transfer from chair to bed with therapist assist  Long discussion with  concerning STR and benefits   agreeable and  made aware   Plan   Treatment/Interventions ADL retraining;Functional transfer training;LE strengthening/ROM; Therapeutic exercise; Endurance training;Patient/family training;Equipment eval/education; Bed mobility;Gait training; Compensatory technique education;Cognitive reorientation   PT Frequency 5x/wk   Recommendation   Recommendation Short-term skilled PT   Licensure   [place] License Number  Darren Bottoms PT [id number]

## 2019-10-16 NOTE — PLAN OF CARE
Problem: OCCUPATIONAL THERAPY ADULT  Goal: Performs self-care activities at highest level of function for planned discharge setting  See evaluation for individualized goals  Outcome: Progressing  Note:   Limitation: Decreased ADL status, Decreased UE ROM, Decreased UE strength, Decreased cognition, Decreased endurance, Decreased self-care trans, Decreased high-level ADLs  Prognosis: Good  Assessment: Patient fatigues with activity  Verbal cues for safety  Max assist for toileting  Patient would benefit from STR         OT Discharge Recommendation: Short Term Rehab

## 2019-10-16 NOTE — NURSING NOTE
Pt d/c from [facility] [place] to [facility]  Report called in to [facility], d/c instructions and medications reviewed with [facility], all questions answered  IV access removed prior to d/c  Pt left the unit via stretcher accompanied by the transport team  Pt left the unit with all belongings in her possession

## 2019-10-16 NOTE — SPEECH THERAPY NOTE
Speech Language/Pathology    Speech/Language Pathology Progress Note    Patient Name: Surya Miller  GZIEF'N Date: 10/16/2019     Family speaking with CM and SLIM bedside upon entering discussing d/c planning  SLP met with  and caregiver after to discuss concerns with PO feeding as pt will require continued speech therapy working towards strengthening/maintaining current function for pt's wishes of pleasure feeds  SLP informed family that given her weakened condition currently and VBS results pt is only safe for HTL via tsp  Discussed that even applesauce which is more towards a runny puree resulted in significant pharyngeal retention  Pt will need to be able t odisplay increased alertness and desire to participate for potential pharyngeal exercises to work towards resuming small amounts of runny puree  Caregiver showed SLP natural TF which was advised against by RD  SLP reported concerns with consistency as it does not appear to be HTL and may require thickener if desired to use and that 9oz of a liquid may fatigue pt and smaller amounts would be more appropriate per sitting       Plan/Recommendations:  -Continue sips of HTL via tsp with TF as primary means of nutrition  -ST upon d/c   -Frequent oral care           Mary WALKER  [address] [facility]  Speech-Language Pathologist  Available via [employer]   [place] #[id number]  [place] #[id number]

## 2019-10-16 NOTE — SOCIAL WORK
Met with pt's spouse and caregiver this AM with attending to discuss pt's discharge plan to home  After discussion, pt spouse was now reconsidering taking her home and felt pt needed STR (which was discussed w/ spouse previously) but wanted PT to come back in to reassess pt  PT did see pt and still recommending STR  Met with pt's spouse afterwards and he would like pt to go to [facility]  Contacted the admissions office and bed is available for today  BLS transport setup for 3:30pm with [place]  Spouse informed  IMM was reviewed with pt's spouse yesterday by registration  Contacted [facility] informing of pt going to [facility] and they will follow when she gets there  Also, updated [facility] and they will hold off from delivering pt's tube feeding supplies until pt is discharged home  Information was provided to [facility]

## 2019-10-16 NOTE — [place] UNIVERSAL TRANSFER FORM
[place] UNIV[place]AL TRANSFER FORM  (ALL ITEMS MUST BE COMPLETED)    1  TRANSFER FROM: [address]      TRANSFER TO: [facility]    2  DATE OF TRANSFER: 10/16/2019                        TIME OF TRANSFER: 1700    3  PATIENT NAME: SHERIE Lackey      YOB: 1933                             GENDER: female    4  LANGUAGE:   English    5  PHYSICIAN NAME:  Omar Griffith MD                   PHONE: [phone]    6  CODE STATUS: Level 1 - Full Code        Out of Hospital DNR Attached: No    7  :                                      :  Extended Emergency Contact Information  Primary Emergency Contact: Aidan Baptiste  Address: [address] [place] [address], [address] [place] [address] Phone: [phone]  Mobile Phone: [phone]  Relation: Spouse  Secondary Emergency Contact: Ran Rothman  Mobile Phone: [phone]  Relation: [address] Representative/Proxy:  No           Legal Guardian:  No             NAME OF:           HEALTH CARE REPRESENTATIVE/PROXY:                                         OR           LEGAL GUARDIAN, IF NOT :                                               PHONE:  (Day)           (Night)                        (Cell)    8  REASON FOR TRANSFER: (Must include brief medical history and recent changes in physical function or cognition  ) PT/OT, aspiration pneumonia            V/S: BP (!) 174/78 (BP Location: Left arm)   Pulse 70   Temp 98 8 °F (37 1 °C) (Axillary)   Resp 18   Ht 5' 1" (1 549 m)   Wt 50 8 kg (112 lb)   SpO2 98%   BMI 21 16 kg/m²           PAIN: None    9  PRIMARY DIAGNOSIS: Aspiration pneumonia ([address] )      Secondary Diagnosis:         Pacemaker: No      Internal Defib: No          Mental Health Diagnosis (if Applicable):    10  RESTRAINTS: No     11  RESPIRATORY NEEDS: None    12  ISOLATION/PRECAUTION: None    13   ALLERGY: Patient has no known allergies  14  SENSORY:       Vision Glasses    15  SKIN CONDITION: No Wounds    16  DIET: Tube Feed    17  IV ACCESS: None    18  PERSONAL ITEMS SENT WITH PATIENT: None    19  ATTACHED DOCUMENTS: MUST ATTACH CURRENT MEDICATION INFORMATION Face Sheet, Medication Reconciliation, Labs and Discharge Summary    20  AT RISK ALERTS:Falls, Pressure Ulcer and Aspiration        HARM TO: N/A    21  WEIGHT BEARING STATUS:         Left Leg: Full        Right Leg: Full    22  MENTAL STATUS:Alert, Oriented and Forgetful    23  FUNCTION:        Walk: Not Able        Transfer: Not Able        Toilet: Not Able        Feed: With Help    24  IMMUNIZATIONS/SCREENING:     There is no immunization history on file for this patient  25  BOWEL: Continent    26  BLADDER: Continent    27   SENDING FACILITY CONTACT: Brynn Lee RN                  Title: RN        Unit: [address] [place]        Phone: [phone] [address] (if known):        Title:        Unit:         Phone:         FORM PREFILLED BY (if applicable)       Title:       Unit:        Phone:         Kamilla Lee RN      Title: RN      Phone: [phone]

## 2019-10-16 NOTE — DISCHARGE SUMMARY
Discharge- Lucero Hawk 1933, 80 y o  female MRN: [id number]    Unit/Bed#: [address] [place] [address] A Encounter: [id number]    Primary Care Provider: Kristina Monge   Date and time admitted to hospital: 10/11/2019 12:41 PM        Elevated blood pressure reading  Assessment & Plan  Patient was noted to have elevated blood on presentation  Asymptomatic  Family reports that usually blood pressure on low, does not want addition of antihypertensive medication  Will observe at present    Other dysphagia  Assessment & Plan  · Secondary to parkinsonism  · History of mild-to-moderate oropharyngeal dysphagia with silent aspiration  · Status post PEG tube placement on 10/15  · Transitionned patient's meds to PEG tube only  · Nutrition is evaluation for tube feed recs  · Continue aspiration precaution  · Speech and swallow evaluation appreciated, patient ok to have pleasure feeds of HTL hand feb via teaspoon only  · Discharged on Jevity 1 2, 1 can (8 oz, 240 mL) 4 times per day, free water flushes of 60 mL before and after each tube feeding bolus  Gait abnormality  Assessment & Plan  PT/OT rec STR, patient initially declined and wanted to take her home with [facility] however they recognized that she needs higher level of care on day of discharge and accepted STR    Weight loss  Assessment & Plan  Secondary to poor p o  Intake and dysphagia   Nutrition evaluation, tube feeds recs as noted    Hypothyroidism  Assessment & Plan  TSH within normal limit  Continue thyroid supplement  Changed from PO to via PEG tube    Parkinson's disease ([facility] [address] [facility] )  Assessment & Plan  · Patient previously on on 2 tabs Sinemet CR and half tablet Sinemet 4 times a day  · Contacted patient's neurologist office, recommended to transition to Sinemet 2 tablets regular release 4 times a day via PEG tube    Discontinued Sinemet CR  · Follow up with Neurology as regarding approval for long-acting capsule form  · Follow up with Neurology after discharge  · PT/OT rec STR    Paroxysmal A-fib [facility])  Assessment & Plan  Not a candidate for anticoagulation  Patient was previously on aspirin 81 mg daily  Resume at discharge, via PEG        * Aspiration pneumonia ([address] )  Assessment & Plan  · Patient with history of parkinsonism with oropharyngeal dysphagia and possible esophageal dysmotility  · Patient was scheduled for PEG tube placement on the day of admission but noted to have increasing cough shortness of breath and hypoxia, procedure was postponed  · Chest x-ray with possibility of small bilateral infiltrate and effusion  · Remains afebrile  Normal white count  Oxygen requirement is improving   · Blood cultures negative so far  · Urine Legionella pneumococcal antigen negative  · Follow-up Speech and swallow evaluation-recommended VBS for safe PO diet for pleasure  · Strict aspiration precautions  · Repeat chest x-ray showed improvement  · Procalcitonin normalized with treatment  · Received 4 days of Ceftriaxone/flagyl  · Bronchodilators, acetylcysteine and chest PT  · Pulmonary following, input appreciated  Recommended scopolamine patch    Hypokalemia-resolved as of 10/13/2019  Assessment & Plan  Replete IV          Discharging Physician / Practitioner: Raphael Lino MD  PCP: Bella Rojo  Admission Date: 10/11/2019  Discharge Date: 10/16/19    Reason for Admission: No chief complaint on file          Resolved Problems  Date Reviewed: 10/11/2019          Resolved    Hypokalemia 10/13/2019     Resolved by  Zayra Holman MD          Consultations During Hospital Stay:  IP CONSULT TO PULMONOLOGY  IP CONSULT TO GASTROENTEROLOGY    Procedures Performed:     · PEG tube placement 10/15    Significant Findings / Test Results:     ·   Results from last 7 days   Lab Units 10/12/19  0548 10/11/19  1416 10/11/19  1405   WBC Thousand/uL 5 40  --  5 42   HEMOGLOBIN g/dL 12 6  --  14 1   PLATELETS Thousands/uL 348 387 422*     Results from last 7 days   Lab Units 10/16/19  0610 10/15/19  0409 10/13/19  0528  10/11/19  1405   SODIUM mmol/L 138 138 141   < > 140   POTASSIUM mmol/L 3 8 3 8 4 2   < > 4 0   CHLORIDE mmol/L 102 102 105   < > 104   CO2 mmol/L 28 25 28   < > 31   BUN mg/dL 8 8 11   < > 14   CREATININE mg/dL 0 68 0 64 0 66   < > 0 81   CALCIUM mg/dL 9 1 8 8 9 0   < > 9 5   TOTAL BILIRUBIN mg/dL  --   --   --   --  0 40   ALK PHOS U/L  --   --   --   --  101   ALT U/L  --   --   --   --  <6*   AST U/L  --   --   --   --  34    < > = values in this interval not displayed  Results from last 7 days   Lab Units 10/11/19  1405   INR  1 04         No results found for: HGBA1C      Results from last 7 days   Lab Units 10/15/19  0521 10/11/19  1405   LACTIC ACID mmol/L  --  0 7   PROCALCITONIN ng/ml 0 12 0 06     Blood Culture:   Lab Results   Component Value Date    BLOODCX No Growth After 4 Days  10/11/2019    BLOODCX No Growth After 4 Days  10/11/2019    BLOODCX No Growth After 5 Days  10/01/2019    BLOODCX No Growth After 5 Days  10/01/2019    BLOODCX No Growth After 5 Days  04/17/2019    BLOODCX No Growth After 5 Days  04/17/2019     Urine Culture:   Lab Results   Component Value Date    URINECX No Growth <1000 cfu/mL 10/01/2019    URINECX >100,000 cfu/ml Escherichia coli (A) 04/17/2019    URINECX (A) 02/14/2019     60,000-69,000 cfu/ml Staphylococcus coagulase negative    URINECX 20,000-29,000 cfu/ml  02/14/2019     Sputum Culture: No components found for: SPUTUMCX  Wound Culture: No results found for: WOUNDCULT     FL barium swallow video w speech   Final Result by ZAIDA MAHAJAN (10/15 4167)      FL barium swallow video w speech   Final Result by  (10/15 0658)      XR chest portable   Final Result by Alea Boston MD (10/14 4954)      Improved right lung base aeration  Subsegmental left lung base atelectasis              Workstation performed: [id number]                Incidental Findings:   ·      Test Results Pending at Discharge (will require follow up):   ·      Outpatient Tests Requested:  ·     Complications:  none    Reason for Admission: No chief complaint on file  Hospital Course:     Per HPI: Maite Vargas is a 80 y o  female patient with a PMH of Parkinson's disease with oropharyngeal dysphagia and possible esophageal dysphagia, hypothyroidism, malnutrition, history of recurrent aspiration pneumonia, dysrhythmia/atrial fibrillation who originally presented for outpatient procedure for elective PEG tube placement due to oropharyngeal and esophageal dysphagia with recurrent aspirations  Patient was noted to be having increasing respiratory secretion and mild hypoxia requiring supplemental oxygen  Procedure was canceled and patient was subsequently admitted with concerns of aspiration pneumonia  Patient and family reported increasing cough from her baseline daily morning cough with clear expectoration  She was also noted to be short of breath  The denied any fever chills but reported ongoing poor p o  Intake and weight loss  Hospital Course: Please see above list of diagnoses and related plan for additional information  Condition at Discharge: stable       Discharge Day Visit / Exam:     Subjective:  Patient denies complaints  Family at bedside, lengthy discussion had with them  Initially want to take patient home with services, but requested home PT/OT every day  Once they realized that she requires higher level of care, they agreed to STR  Vitals: Blood Pressure: (!) 174/78 (10/16/19 0824)  Pulse: 70 (10/16/19 0824)  Temperature: 98 8 °F (37 1 °C) (10/16/19 0824)  Temp Source: Axillary (10/16/19 0824)  Respirations: 18 (10/16/19 0824)  Height: 5' 1" (154 9 cm) (10/11/19 1512)  Weight - Scale: 50 8 kg (112 lb) (10/11/19 1800)  SpO2: 92 % (10/16/19 0824)  Exam:   Physical Exam   Constitutional: She appears well-developed  No distress  Frail, cachectic   HENT:   Head: Normocephalic and atraumatic     Cardiovascular: Normal rate, regular rhythm and normal heart sounds  Pulmonary/Chest: Effort normal and breath sounds normal  No respiratory distress  She has no wheezes  She has no rales  Clear but decreased   Abdominal: Soft  Bowel sounds are normal  She exhibits no distension  There is no tenderness  There is no rebound  Musculoskeletal: She exhibits no edema, tenderness or deformity  Skin: Skin is warm and dry  Psychiatric: She has a normal mood and affect  Her behavior is normal    At baseline   Nursing note and vitals reviewed  Discharge instructions/Information to patient and family:   See after visit summary for information provided to patient and family  Provisions for Follow-Up Care:  See after visit summary for information related to follow-up care and any pertinent home health orders  Disposition:     Acute Rehab at [facility]    Planned Readmission: no     Discharge Statement:  I spent >30 minutes discharging the patient  This time was spent on the day of discharge  I had direct contact with the patient on the day of discharge  Greater than 50% of the total time was spent examining patient, answering all patient questions, arranging and discussing plan of care with patient as well as directly providing post-discharge instructions  Additional time then spent on discharge activities  Discharge Medications:  See after visit summary for reconciled discharge medications provided to patient and family        ** Please Note: This note has been constructed using a voice recognition system **

## 2019-10-16 NOTE — PLAN OF CARE
Problem: Nutrition/Hydration-ADULT  Goal: Nutrient/Hydration intake appropriate for improving, restoring or maintaining nutritional needs  Description  Monitor and assess patient's nutrition/hydration status for malnutrition  Collaborate with interdisciplinary team and initiate plan and interventions as ordered  Monitor patient's weight and dietary intake as ordered or per policy  INTERVENTIONS:  - Monitor oral intake, urinary output, labs and treatment plans  - Assess nutrition and hydration status and recommend course of action  - Recommend, monitor and adjust tube feedings based on assessed needs  - Provide specific nutrition/hydration education as appropriate  - Include patient/family/caregiver in decisions related to nutrition    Outcome: Progressing     Problem: Prexisting or High Potential for Compromised Skin Integrity  Goal: Skin integrity is maintained or improved  Description  INTERVENTIONS:  - Assess and monitor skin integrity  - Assess and monitor nutrition and hydration status  - Monitor labs   - Assess for incontinence   - Turn and reposition patient  - Assist with mobility/ambulation  - Relieve pressure over bony prominences  - Avoid friction and shearing  - Evaluate need for skin moisturizer/barrier cream     Outcome: Progressing     Problem: Potential for Falls  Goal: Patient will remain free of falls  Description  INTERVENTIONS:  - Assess patient frequently for physical needs  -  Identify cognitive and physical deficits and behaviors that affect risk of falls    -  [place] fall precautions as indicated by assessment   - Educate patient/family on patient safety including physical limitations  - Instruct patient to call for assistance with activity based on assessment  - Modify environment to reduce risk of injury  - Consider OT/PT consult to assist with strengthening/mobility  Outcome: Progressing

## 2019-10-16 NOTE — PLAN OF CARE
Problem: Nutrition/Hydration-ADULT  Goal: Nutrient/Hydration intake appropriate for improving, restoring or maintaining nutritional needs  Description  Monitor and assess patient's nutrition/hydration status for malnutrition  Collaborate with interdisciplinary team and initiate plan and interventions as ordered  Monitor patient's weight and dietary intake as ordered or per policy  Utilize nutrition screening tool and intervene as necessary  Determine patient's food preferences and provide high-protein, high-caloric foods as appropriate       INTERVENTIONS:  - Monitor oral intake, urinary output, labs and treatment plans  - Assess nutrition and hydration status and recommend course of action  - Evaluate the amount of meals eaten  - Assist patient with eating if necessary   - Allow adequate time for meals  - Recommend/ encourage appropriate diets, oral nutritional supplements, and vitamin/mineral supplements  - Order, calculate, and assess calorie counts as needed  - Recommend, monitor and adjust tube feedings based on assessed needs  - Provide specific nutrition/hydration education as appropriate  - Include patient/family/caregiver in decisions related to nutrition    10/16/2019 1559 by Katt Delacruz RN  Outcome: Adequate for Discharge  10/16/2019 1024 by Katt Delacruz RN  Outcome: Progressing     Problem: RESPIRATORY - ADULT  Goal: Achieves optimal ventilation and oxygenation  Description  INTERVENTIONS:  - Assess for changes in respiratory status  - Assess for changes in mentation and behavior  - Position to facilitate oxygenation and minimize respiratory effort  - Oxygen administered by appropriate delivery if ordered  - Assess the need for suctioning and aspirate as needed  - Respiratory Therapy support as indicated  - Nebulizer    10/16/2019 1559 by Katt Delacruz RN  Outcome: Adequate for Discharge  10/16/2019 1024 by Katt Delacruz RN  Outcome: Progressing     Problem: Prexisting or High Potential for Compromised Skin Integrity  Goal: Skin integrity is maintained or improved  Description  INTERVENTIONS:  - Assess and monitor skin integrity  - Assess and monitor nutrition and hydration status  - Monitor labs   - Assess for incontinence   - Turn and reposition patient  - Assist with mobility/ambulation  - Relieve pressure over bony prominences  - Avoid friction and shearing  - Evaluate need for skin moisturizer/barrier cream     10/16/2019 1559 by Ciro Quintero RN  Outcome: Adequate for Discharge  10/16/2019 1024 by Ciro Quintero RN  Outcome: Progressing     Problem: Potential for Falls  Goal: Patient will remain free of falls  Description  INTERVENTIONS:  - Assess patient frequently for physical needs  -  Identify cognitive and physical deficits and behaviors that affect risk of falls    -  [place] fall precautions as indicated by assessment   - Educate patient/family on patient safety including physical limitations  - Instruct patient to call for assistance with activity based on assessment  - Modify environment to reduce risk of injury  - Consider OT/PT consult to assist with strengthening/mobility  10/16/2019 1559 by Ciro Quintero RN  Outcome: Adequate for Discharge  10/16/2019 1024 by Ciro Quintero RN  Outcome: Progressing

## 2019-10-17 NOTE — TELEPHONE ENCOUNTER
Pt's  Elo Camargo called and advised of all of the below  He verbalized understanding  States that pt is currently at [facility]  Elo Camargo states that as much as possible he would like pt to live a normal life as possible  Plan for right now is for pt to get her meds via peg and PO  Agreeable to discuss switching to rytary at next f/u visit if pt does not response well w/ the med change  At this time he cannot confirm if pt is taking inbrija     He will call us w/ update

## 2023-06-07 NOTE — ASSESSMENT & PLAN NOTE
Left message to notify patient that Dr. Collazo said that it is okay to take Wegovy and Humira together.    Sodium 127 on admission with a history of hyponatremia on previous admissions     · Likely secondary to volume depletion likely in setting of UTI  · Improved with IV fluids  · Seen and followed by Nephrology, input appreciated  · Sodium remained stable
